# Patient Record
Sex: FEMALE | HISPANIC OR LATINO
[De-identification: names, ages, dates, MRNs, and addresses within clinical notes are randomized per-mention and may not be internally consistent; named-entity substitution may affect disease eponyms.]

---

## 2018-05-15 ENCOUNTER — RX ONLY (RX ONLY)
Age: 58
End: 2018-05-15

## 2018-05-15 ENCOUNTER — DOCTOR'S OFFICE (OUTPATIENT)
Dept: URBAN - METROPOLITAN AREA CLINIC 136 | Facility: CLINIC | Age: 58
Setting detail: OPHTHALMOLOGY
End: 2018-05-15
Payer: MEDICARE

## 2018-05-15 ENCOUNTER — OPTICAL OFFICE (OUTPATIENT)
Dept: URBAN - METROPOLITAN AREA CLINIC 143 | Facility: CLINIC | Age: 58
Setting detail: OPHTHALMOLOGY
End: 2018-05-15
Payer: COMMERCIAL

## 2018-05-15 DIAGNOSIS — H52.223: ICD-10-CM

## 2018-05-15 DIAGNOSIS — H52.4: ICD-10-CM

## 2018-05-15 DIAGNOSIS — H52.03: ICD-10-CM

## 2018-05-15 PROCEDURE — V2020 VISION SVCS FRAMES PURCHASES: HCPCS | Performed by: OPTOMETRIST

## 2018-05-15 PROCEDURE — V2203 LENS SPHCYL BIFOCAL 4.00D/.1: HCPCS | Performed by: OPTOMETRIST

## 2018-05-15 PROCEDURE — V2781 PROGRESSIVE LENS PER LENS: HCPCS | Performed by: OPTOMETRIST

## 2018-05-15 PROCEDURE — 92004 COMPRE OPH EXAM NEW PT 1/>: CPT | Performed by: OPTOMETRIST

## 2018-05-15 ASSESSMENT — REFRACTION_AUTOREFRACTION
OS_CYLINDER: -1.00
OD_AXIS: 110
OD_SPHERE: +0.50
OS_SPHERE: +1.25
OS_AXIS: 76
OD_CYLINDER: -0.75

## 2018-05-15 ASSESSMENT — REFRACTION_OUTSIDERX
OS_VA3: 20/
OS_AXIS: 075
OU_VA: 20/20
OD_SPHERE: +0.50
OD_VA1: 20/20
OD_ADD: +2.00
OS_ADD: +2.00
OD_VA3: 20/
OD_VA2: 20/20
OS_VA1: 20/20
OS_CYLINDER: -0.75
OS_SPHERE: +1.25
OS_VA2: 20/20
OD_CYLINDER: SPH

## 2018-05-15 ASSESSMENT — REFRACTION_MANIFEST
OD_VA1: 20/
OS_VA2: 20/
OS_VA3: 20/
OS_VA3: 20/
OD_VA1: 20/
OU_VA: 20/
OD_VA2: 20/
OD_VA3: 20/
OU_VA: 20/
OS_VA2: 20/
OD_VA3: 20/
OD_VA2: 20/
OS_VA1: 20/
OS_VA1: 20/

## 2018-05-15 ASSESSMENT — REFRACTION_CURRENTRX
OD_OVR_VA: 20/
OD_OVR_VA: 20/
OS_OVR_VA: 20/
OD_OVR_VA: 20/

## 2018-05-15 ASSESSMENT — SPHEQUIV_DERIVED
OS_SPHEQUIV: 0.75
OD_SPHEQUIV: 0.125

## 2018-05-15 ASSESSMENT — VISUAL ACUITY
OS_BCVA: 20/20-2
OD_BCVA: 20/20-2

## 2018-05-15 ASSESSMENT — CONFRONTATIONAL VISUAL FIELD TEST (CVF)
OS_FINDINGS: FULL
OD_FINDINGS: FULL

## 2018-06-08 ENCOUNTER — OPTICAL OFFICE (OUTPATIENT)
Dept: URBAN - METROPOLITAN AREA CLINIC 143 | Facility: CLINIC | Age: 58
Setting detail: OPHTHALMOLOGY
End: 2018-06-08

## 2018-06-08 DIAGNOSIS — H52.7: ICD-10-CM

## 2018-06-08 PROCEDURE — V2020 VISION SVCS FRAMES PURCHASES: HCPCS | Performed by: OPTOMETRIST

## 2018-07-05 DIAGNOSIS — I10 ESSENTIAL HYPERTENSION: Primary | ICD-10-CM

## 2018-07-05 RX ORDER — NIFEDIPINE 30 MG/1
30 TABLET, FILM COATED, EXTENDED RELEASE ORAL DAILY
Qty: 30 TABLET | Refills: 0 | Status: SHIPPED | OUTPATIENT
Start: 2018-07-05 | End: 2018-08-07 | Stop reason: SDUPTHER

## 2018-07-05 RX ORDER — NIFEDIPINE 30 MG/1
30 TABLET, FILM COATED, EXTENDED RELEASE ORAL DAILY
Refills: 0 | COMMUNITY
Start: 2018-06-05 | End: 2018-07-05 | Stop reason: SDUPTHER

## 2018-08-07 DIAGNOSIS — E55.9 VITAMIN D DEFICIENCY: ICD-10-CM

## 2018-08-07 DIAGNOSIS — I10 ESSENTIAL HYPERTENSION: ICD-10-CM

## 2018-08-07 DIAGNOSIS — K21.9 GASTROESOPHAGEAL REFLUX DISEASE, ESOPHAGITIS PRESENCE NOT SPECIFIED: Primary | ICD-10-CM

## 2018-08-07 RX ORDER — MELATONIN
1000 DAILY
Qty: 30 TABLET | Refills: 0 | Status: SHIPPED | OUTPATIENT
Start: 2018-08-07 | End: 2019-10-17 | Stop reason: ALTCHOICE

## 2018-08-07 RX ORDER — PANTOPRAZOLE SODIUM 40 MG/1
40 TABLET, DELAYED RELEASE ORAL DAILY
Qty: 30 TABLET | Refills: 0 | Status: SHIPPED | OUTPATIENT
Start: 2018-08-07 | End: 2018-09-04 | Stop reason: SDUPTHER

## 2018-08-07 RX ORDER — NIFEDIPINE 30 MG/1
30 TABLET, FILM COATED, EXTENDED RELEASE ORAL DAILY
Qty: 30 TABLET | Refills: 0 | Status: SHIPPED | OUTPATIENT
Start: 2018-08-07 | End: 2018-09-04 | Stop reason: SDUPTHER

## 2018-08-30 PROBLEM — K27.9 PEPTIC ULCER: Status: ACTIVE | Noted: 2017-12-01

## 2018-08-30 PROBLEM — K63.5 COLON POLYP: Status: ACTIVE | Noted: 2018-01-12

## 2018-08-30 RX ORDER — SENNOSIDES 8.6 MG
1 CAPSULE ORAL EVERY 8 HOURS PRN
COMMUNITY
Start: 2014-10-28 | End: 2018-08-31 | Stop reason: SDUPTHER

## 2018-08-30 RX ORDER — CITALOPRAM 20 MG/1
1 TABLET ORAL EVERY 24 HOURS
COMMUNITY
Start: 2017-12-01 | End: 2019-10-17 | Stop reason: ALTCHOICE

## 2018-08-30 RX ORDER — LANOLIN ALCOHOL/MO/W.PET/CERES
1 CREAM (GRAM) TOPICAL
COMMUNITY
Start: 2017-12-01 | End: 2019-10-17 | Stop reason: ALTCHOICE

## 2018-08-30 RX ORDER — ATORVASTATIN CALCIUM 20 MG/1
1 TABLET, FILM COATED ORAL EVERY 24 HOURS
COMMUNITY
Start: 2018-05-24 | End: 2019-01-21 | Stop reason: SDUPTHER

## 2018-08-30 RX ORDER — SUMATRIPTAN 25 MG/1
1 TABLET, FILM COATED ORAL AS NEEDED
COMMUNITY
Start: 2016-08-12 | End: 2018-08-31 | Stop reason: SDUPTHER

## 2018-08-30 RX ORDER — ERGOCALCIFEROL 1.25 MG/1
1 CAPSULE ORAL WEEKLY
COMMUNITY
Start: 2018-02-05 | End: 2019-10-17 | Stop reason: ALTCHOICE

## 2018-08-30 RX ORDER — ACETAMINOPHEN, ASPIRIN AND CAFFEINE 250; 250; 65 MG/1; MG/1; MG/1
1 TABLET, FILM COATED ORAL AS NEEDED
COMMUNITY
End: 2022-01-19

## 2018-08-31 ENCOUNTER — OFFICE VISIT (OUTPATIENT)
Dept: FAMILY MEDICINE CLINIC | Facility: CLINIC | Age: 58
End: 2018-08-31
Payer: COMMERCIAL

## 2018-08-31 VITALS
HEART RATE: 72 BPM | HEIGHT: 65 IN | DIASTOLIC BLOOD PRESSURE: 90 MMHG | WEIGHT: 218 LBS | RESPIRATION RATE: 12 BRPM | TEMPERATURE: 97.1 F | OXYGEN SATURATION: 99 % | BODY MASS INDEX: 36.32 KG/M2 | SYSTOLIC BLOOD PRESSURE: 130 MMHG

## 2018-08-31 DIAGNOSIS — G43.109 MIGRAINE WITH AURA AND WITHOUT STATUS MIGRAINOSUS, NOT INTRACTABLE: ICD-10-CM

## 2018-08-31 DIAGNOSIS — E78.49 OTHER HYPERLIPIDEMIA: ICD-10-CM

## 2018-08-31 DIAGNOSIS — M72.2 PLANTAR FASCIITIS OF RIGHT FOOT: Primary | ICD-10-CM

## 2018-08-31 PROCEDURE — 99213 OFFICE O/P EST LOW 20 MIN: CPT | Performed by: FAMILY MEDICINE

## 2018-08-31 PROCEDURE — 3008F BODY MASS INDEX DOCD: CPT | Performed by: FAMILY MEDICINE

## 2018-08-31 RX ORDER — MELOXICAM 7.5 MG/1
7.5 TABLET ORAL DAILY PRN
Qty: 30 TABLET | Refills: 0 | Status: SHIPPED | OUTPATIENT
Start: 2018-08-31 | End: 2019-10-17 | Stop reason: ALTCHOICE

## 2018-08-31 RX ORDER — SUMATRIPTAN 25 MG/1
25 TABLET, FILM COATED ORAL AS NEEDED
Qty: 15 TABLET | Refills: 0 | Status: SHIPPED | OUTPATIENT
Start: 2018-08-31 | End: 2019-10-17 | Stop reason: ALTCHOICE

## 2018-08-31 RX ORDER — SENNOSIDES 8.6 MG
650 CAPSULE ORAL EVERY 8 HOURS PRN
Qty: 90 TABLET | Refills: 0 | Status: SHIPPED | OUTPATIENT
Start: 2018-08-31 | End: 2019-10-17 | Stop reason: SDUPTHER

## 2018-08-31 NOTE — PATIENT INSTRUCTIONS
For left foot pain  - Meloxicam once a day with food  Continue protonix  - Exercise as directed  - Cold compresses  - Shoe orthotic  Avoid sandals  For migraine  - Stay well hydrated  - Maintain head ache log  - Avoid excess caffeine, wine, aged cheese, chocolates  - Imitrex at start   If using more than two a week contact office

## 2018-08-31 NOTE — PROGRESS NOTES
Assessment/Plan:    Plantar fasciitis of right foot  - Tylenol 650 mg Q6 prn,  Meloxicam 7 5 mg QD prn  - Advised use of shoe insert, avoid sandals  - Exercises for plantar fascia as demonstrated  - Ice packs/ warm compresses as needed   - Follow up in one month  If pain persists will consider steroid injection during follow up    Hyperlipidemia  - H/o TIA v/s migraine   - 12/2017 Total cholesterol 232     - Would dolly to be on high intensity statin, however on atorvastatin 20 mg HS  Would target LDL lowering ideally to 70 and not more than 100  - Lipid panel and adjust statin based on result    Migraine with aura   - Less than 2 episodes/ week  - Stay hydrated, good sleep hygiene, avoid excess caffeine/ wine/ aged cheese/ chocolates  - Headache log  - Refill on imitrex provided   - States already established with neurologist  Encouraged to keep up with follow up visits     Diagnoses and all orders for this visit:    Plantar fasciitis of right foot  -     acetaminophen (TYLENOL 8 HOUR ARTHRITIS PAIN) 650 mg CR tablet; Take 1 tablet (650 mg total) by mouth every 8 (eight) hours as needed for moderate pain or headaches  -     meloxicam (MOBIC) 7 5 mg tablet; Take 1 tablet (7 5 mg total) by mouth daily as needed for moderate pain    Migraine with aura and without status migrainosus, not intractable  -     SUMAtriptan (IMITREX) 25 mg tablet; Take 1 tablet (25 mg total) by mouth as needed for migraine May repeat after 2 hours if headache returns, not to exceed 200 mg in 24 hours    Other hyperlipidemia  -     Lipid panel; Future    Other orders  -     atorvastatin (LIPITOR) 20 mg tablet; Take 1 tablet by mouth every 24 hours  -     citalopram (CELEXA) 20 mg tablet; Take 1 tablet by mouth every 24 hours  -     aspirin-acetaminophen-caffeine (EXCEDRIN MIGRAINE) 321-225-36 MG per tablet; Take 1 tablet by mouth as needed  -     melatonin 3 mg;  Take 1 tablet by mouth daily at bedtime  -     Discontinue: SUMAtriptan (IMITREX) 25 mg tablet; Take 1 tablet by mouth as needed May repeat after 2 hours if headache returns, not to exceed 200 mg in 24 hours  -     Discontinue: acetaminophen (TYLENOL 8 HOUR ARTHRITIS PAIN) 650 mg CR tablet; Take 1 tablet by mouth every 8 (eight) hours as needed  -     ergocalciferol (VITAMIN D2) 50,000 units; Take 1 capsule by mouth once a week          Subjective:      Patient ID: Tim Cross is a 65 y o  female  PMH of migraine with aura/ possible TIA/ HLD/ GERD       Leg Pain    The incident occurred more than 1 week ago  The incident occurred at home  There was no injury mechanism  The pain is present in the left foot  The quality of the pain is described as aching  The pain is severe  The pain has been constant since onset  Pertinent negatives include no inability to bear weight, loss of motion, loss of sensation, muscle weakness, numbness or tingling  She reports no foreign bodies present  The symptoms are aggravated by palpation and weight bearing  She has tried nothing for the symptoms  The following portions of the patient's history were reviewed and updated as appropriate: She  has no past medical history on file  Patient Active Problem List    Diagnosis Date Noted    Colon polyp 01/12/2018    Peptic ulcer 12/01/2017    Allergic rhinitis 08/12/2016    Vitamin D deficiency 08/12/2016    Sleep disorder 08/12/2016    Anxiety 11/04/2014    Depression 11/04/2014    Gastroesophageal reflux disease 11/04/2014    Hypertension 11/04/2014    Hyperlipidemia 11/04/2014    Temporary cerebral vascular dysfunction 11/04/2014    Obesity 09/24/2014    Migraine with aura 05/07/2012     She  has no past surgical history on file  Her family history is not on file  She  reports that she has never smoked  She does not have any smokeless tobacco history on file  Her alcohol and drug histories are not on file    Current Outpatient Prescriptions   Medication Sig Dispense Refill    acetaminophen (TYLENOL 8 HOUR ARTHRITIS PAIN) 650 mg CR tablet Take 1 tablet (650 mg total) by mouth every 8 (eight) hours as needed for moderate pain or headaches 90 tablet 0    atorvastatin (LIPITOR) 20 mg tablet Take 1 tablet by mouth every 24 hours      citalopram (CELEXA) 20 mg tablet Take 1 tablet by mouth every 24 hours      ergocalciferol (VITAMIN D2) 50,000 units Take 1 capsule by mouth once a week      melatonin 3 mg Take 1 tablet by mouth daily at bedtime      SUMAtriptan (IMITREX) 25 mg tablet Take 1 tablet (25 mg total) by mouth as needed for migraine May repeat after 2 hours if headache returns, not to exceed 200 mg in 24 hours 15 tablet 0    aspirin-acetaminophen-caffeine (EXCEDRIN MIGRAINE) 250-250-65 MG per tablet Take 1 tablet by mouth as needed      cholecalciferol (VITAMIN D3) 1,000 units tablet Take 1 tablet (1,000 Units total) by mouth daily 30 tablet 0    meloxicam (MOBIC) 7 5 mg tablet Take 1 tablet (7 5 mg total) by mouth daily as needed for moderate pain 30 tablet 0    NIFEdipine ER (ADALAT CC) 30 MG 24 hr tablet Take 1 tablet (30 mg total) by mouth daily 30 tablet 0    pantoprazole (PROTONIX) 40 mg tablet Take 1 tablet (40 mg total) by mouth daily 30 tablet 0     No current facility-administered medications for this visit  Current Outpatient Prescriptions on File Prior to Visit   Medication Sig    cholecalciferol (VITAMIN D3) 1,000 units tablet Take 1 tablet (1,000 Units total) by mouth daily    NIFEdipine ER (ADALAT CC) 30 MG 24 hr tablet Take 1 tablet (30 mg total) by mouth daily    pantoprazole (PROTONIX) 40 mg tablet Take 1 tablet (40 mg total) by mouth daily     No current facility-administered medications on file prior to visit  She is allergic to aspirin       Review of Systems   Constitutional: Negative for chills and fever  HENT: Negative for postnasal drip, sinus pain, sinus pressure, sore throat and trouble swallowing      Eyes: Negative for visual disturbance  Respiratory: Negative for cough, chest tightness and shortness of breath  Cardiovascular: Negative for chest pain, palpitations and leg swelling  Gastrointestinal: Negative for abdominal pain, constipation, diarrhea, nausea and vomiting  Genitourinary: Negative for dysuria and hematuria  Musculoskeletal: Negative for gait problem, joint swelling and myalgias  Right foot pain, over heel   Skin: Negative for rash  Neurological: Positive for headaches  Negative for tingling and numbness  Objective:      /90 (BP Location: Right arm, Patient Position: Sitting, Cuff Size: Large)   Pulse 72   Temp (!) 97 1 °F (36 2 °C) (Temporal)   Resp 12   Ht 5' 5" (1 651 m)   Wt 98 9 kg (218 lb)   SpO2 99%   BMI 36 28 kg/m²          Physical Exam   Constitutional: She appears well-developed and well-nourished  No distress  HENT:   Head: Normocephalic and atraumatic  Mouth/Throat: Oropharynx is clear and moist    Eyes: Conjunctivae and EOM are normal  Pupils are equal, round, and reactive to light  Right eye exhibits no discharge  Left eye exhibits no discharge  Neck: Normal range of motion  No JVD present  No thyromegaly present  Cardiovascular: Normal rate, regular rhythm, normal heart sounds and intact distal pulses  Exam reveals no gallop and no friction rub  No murmur heard  Pulmonary/Chest: Effort normal and breath sounds normal  No respiratory distress  She has no wheezes  She has no rales  Abdominal: Soft  Bowel sounds are normal  There is no tenderness  Musculoskeletal:        Right ankle: She exhibits normal range of motion, no swelling, no ecchymosis, no deformity, no laceration and normal pulse  No lateral malleolus, no medial malleolus, no AITFL, no CF ligament, no posterior TFL, no head of 5th metatarsal and no proximal fibula tenderness found  Achilles tendon exhibits no pain          Left ankle: Normal         Feet:    Lymphadenopathy:     She has no cervical adenopathy  Skin: She is not diaphoretic

## 2018-09-04 DIAGNOSIS — I10 ESSENTIAL HYPERTENSION: ICD-10-CM

## 2018-09-04 DIAGNOSIS — K21.9 GASTROESOPHAGEAL REFLUX DISEASE, ESOPHAGITIS PRESENCE NOT SPECIFIED: ICD-10-CM

## 2018-09-04 RX ORDER — NIFEDIPINE 30 MG/1
30 TABLET, FILM COATED, EXTENDED RELEASE ORAL DAILY
Qty: 30 TABLET | Refills: 0 | Status: SHIPPED | OUTPATIENT
Start: 2018-09-04 | End: 2018-10-07 | Stop reason: SDUPTHER

## 2018-09-04 RX ORDER — PANTOPRAZOLE SODIUM 40 MG/1
40 TABLET, DELAYED RELEASE ORAL DAILY
Qty: 30 TABLET | Refills: 0 | Status: SHIPPED | OUTPATIENT
Start: 2018-09-04 | End: 2018-10-07 | Stop reason: SDUPTHER

## 2018-09-28 DIAGNOSIS — M72.2 PLANTAR FASCIITIS OF RIGHT FOOT: ICD-10-CM

## 2018-09-28 RX ORDER — MELOXICAM 7.5 MG/1
TABLET ORAL
Qty: 30 TABLET | Refills: 0 | OUTPATIENT
Start: 2018-09-28

## 2018-09-28 NOTE — TELEPHONE ENCOUNTER
Spoke with patient  Last received prescription for meloxicam 7 5 mg on 8/31/2018 for 30 tablets  Encouraged her to use existing medication only as needed when pain is severe  For mild to moderate pain can use tylenol 650 mg every 6-8 hours as needed  Patient verbalized understanding and had no further questions

## 2018-10-07 DIAGNOSIS — I10 ESSENTIAL HYPERTENSION: ICD-10-CM

## 2018-10-07 DIAGNOSIS — K21.9 GASTROESOPHAGEAL REFLUX DISEASE, ESOPHAGITIS PRESENCE NOT SPECIFIED: ICD-10-CM

## 2018-10-08 RX ORDER — PANTOPRAZOLE SODIUM 40 MG/1
TABLET, DELAYED RELEASE ORAL
Qty: 30 TABLET | Refills: 0 | Status: SHIPPED | OUTPATIENT
Start: 2018-10-08 | End: 2019-10-17 | Stop reason: ALTCHOICE

## 2018-10-08 RX ORDER — NIFEDIPINE 30 MG/1
TABLET, FILM COATED, EXTENDED RELEASE ORAL
Qty: 30 TABLET | Refills: 1 | Status: SHIPPED | OUTPATIENT
Start: 2018-10-08 | End: 2018-12-03 | Stop reason: SDUPTHER

## 2018-12-03 DIAGNOSIS — I10 ESSENTIAL HYPERTENSION: ICD-10-CM

## 2018-12-04 RX ORDER — NIFEDIPINE 30 MG/1
TABLET, FILM COATED, EXTENDED RELEASE ORAL
Qty: 30 TABLET | Refills: 0 | Status: SHIPPED | OUTPATIENT
Start: 2018-12-04 | End: 2018-12-31 | Stop reason: SDUPTHER

## 2018-12-31 DIAGNOSIS — I10 ESSENTIAL HYPERTENSION: ICD-10-CM

## 2018-12-31 RX ORDER — NIFEDIPINE 30 MG/1
TABLET, FILM COATED, EXTENDED RELEASE ORAL
Qty: 30 TABLET | Refills: 0 | Status: SHIPPED | OUTPATIENT
Start: 2018-12-31 | End: 2019-01-23 | Stop reason: SDUPTHER

## 2019-01-21 DIAGNOSIS — E78.5 HYPERLIPIDEMIA, UNSPECIFIED HYPERLIPIDEMIA TYPE: Primary | ICD-10-CM

## 2019-01-21 RX ORDER — ATORVASTATIN CALCIUM 20 MG/1
20 TABLET, FILM COATED ORAL EVERY 24 HOURS
Qty: 30 TABLET | Refills: 1 | Status: SHIPPED | OUTPATIENT
Start: 2019-01-21 | End: 2019-10-17 | Stop reason: ALTCHOICE

## 2019-01-21 NOTE — TELEPHONE ENCOUNTER
Refill prescribed  Labs from last visit pending  Patient needs appointment scheduled for her HTN/ HLD

## 2019-01-21 NOTE — TELEPHONE ENCOUNTER
I contact Mariana and schedule her at your next available appt slot which is 03/01/2019 @3:20PM  Trisha Beard understands and is aware of this appt

## 2019-01-22 DIAGNOSIS — I10 ESSENTIAL HYPERTENSION: ICD-10-CM

## 2019-01-23 RX ORDER — NIFEDIPINE 30 MG/1
30 TABLET, FILM COATED, EXTENDED RELEASE ORAL DAILY
Qty: 30 TABLET | Refills: 0 | Status: SHIPPED | OUTPATIENT
Start: 2019-01-23 | End: 2019-10-17 | Stop reason: ALTCHOICE

## 2019-01-23 RX ORDER — NIFEDIPINE 30 MG/1
TABLET, FILM COATED, EXTENDED RELEASE ORAL
Qty: 30 TABLET | Refills: 0 | OUTPATIENT
Start: 2019-01-23

## 2019-05-31 ENCOUNTER — OPTICAL OFFICE (OUTPATIENT)
Dept: URBAN - METROPOLITAN AREA CLINIC 143 | Facility: CLINIC | Age: 59
Setting detail: OPHTHALMOLOGY
End: 2019-05-31

## 2019-05-31 ENCOUNTER — DOCTOR'S OFFICE (OUTPATIENT)
Dept: URBAN - METROPOLITAN AREA CLINIC 136 | Facility: CLINIC | Age: 59
Setting detail: OPHTHALMOLOGY
End: 2019-05-31
Payer: MEDICARE

## 2019-05-31 DIAGNOSIS — H52.03: ICD-10-CM

## 2019-05-31 DIAGNOSIS — H52.223: ICD-10-CM

## 2019-05-31 DIAGNOSIS — H52.4: ICD-10-CM

## 2019-05-31 PROCEDURE — V2020 VISION SVCS FRAMES PURCHASES: HCPCS | Performed by: OPTOMETRIST

## 2019-05-31 PROCEDURE — V2103 SPHEROCYLINDR 4.00D/12-2.00D: HCPCS | Performed by: OPTOMETRIST

## 2019-05-31 PROCEDURE — V2100 LENS SPHER SINGLE PLANO 4.00: HCPCS | Performed by: OPTOMETRIST

## 2019-05-31 PROCEDURE — 92014 COMPRE OPH EXAM EST PT 1/>: CPT | Performed by: OPTOMETRIST

## 2019-05-31 ASSESSMENT — REFRACTION_MANIFEST
OD_VA2: 20/20
OS_ADD: +2.25
OD_CYLINDER: SPH
OD_VA3: 20/
OU_VA: 20/
OD_VA1: 20/
OS_VA1: 20/20
OS_CYLINDER: -0.75
OS_VA2: 20/
OS_VA3: 20/
OD_ADD: +2.25
OS_VA1: 20/
OU_VA: 20/20
OS_VA2: 20/20
OD_VA3: 20/
OS_SPHERE: +1.50
OS_VA3: 20/
OS_AXIS: 075
OD_VA2: 20/
OD_VA1: 20/20
OD_SPHERE: +0.75

## 2019-05-31 ASSESSMENT — REFRACTION_CURRENTRX
OS_SPHERE: +1.25
OD_OVR_VA: 20/
OD_CYLINDER: SPH
OS_OVR_VA: 20/
OD_ADD: +2.00
OS_CYLINDER: -0.75
OS_ADD: +2.00
OD_OVR_VA: 20/
OD_VPRISM_DIRECTION: PROGS
OS_OVR_VA: 20/
OD_OVR_VA: 20/
OS_OVR_VA: 20/
OS_AXIS: 075
OD_SPHERE: +0.50
OS_VPRISM_DIRECTION: PROGS

## 2019-05-31 ASSESSMENT — REFRACTION_AUTOREFRACTION
OS_AXIS: 069
OD_CYLINDER: -0.75
OS_CYLINDER: -0.75
OS_SPHERE: +1.50
OD_AXIS: 126
OD_SPHERE: +1.00

## 2019-05-31 ASSESSMENT — VISUAL ACUITY
OS_BCVA: 20/20-1
OD_BCVA: 20/20-1

## 2019-05-31 ASSESSMENT — CONFRONTATIONAL VISUAL FIELD TEST (CVF)
OD_FINDINGS: FULL
OS_FINDINGS: FULL

## 2019-05-31 ASSESSMENT — SPHEQUIV_DERIVED
OD_SPHEQUIV: 0.625
OS_SPHEQUIV: 1.125
OS_SPHEQUIV: 1.125

## 2019-10-17 ENCOUNTER — OFFICE VISIT (OUTPATIENT)
Dept: FAMILY MEDICINE CLINIC | Facility: CLINIC | Age: 59
End: 2019-10-17

## 2019-10-17 VITALS
OXYGEN SATURATION: 98 % | TEMPERATURE: 97.1 F | RESPIRATION RATE: 18 BRPM | SYSTOLIC BLOOD PRESSURE: 144 MMHG | HEART RATE: 74 BPM | BODY MASS INDEX: 38.72 KG/M2 | DIASTOLIC BLOOD PRESSURE: 90 MMHG | WEIGHT: 232.7 LBS

## 2019-10-17 DIAGNOSIS — I10 ESSENTIAL HYPERTENSION: Primary | ICD-10-CM

## 2019-10-17 DIAGNOSIS — K21.9 GASTROESOPHAGEAL REFLUX DISEASE WITHOUT ESOPHAGITIS: ICD-10-CM

## 2019-10-17 DIAGNOSIS — E55.9 VITAMIN D DEFICIENCY: ICD-10-CM

## 2019-10-17 DIAGNOSIS — G89.29 CHRONIC NONINTRACTABLE HEADACHE, UNSPECIFIED HEADACHE TYPE: ICD-10-CM

## 2019-10-17 DIAGNOSIS — R51.9 CHRONIC NONINTRACTABLE HEADACHE, UNSPECIFIED HEADACHE TYPE: ICD-10-CM

## 2019-10-17 DIAGNOSIS — Z91.89 AT RISK FOR OBSTRUCTIVE SLEEP APNEA: ICD-10-CM

## 2019-10-17 DIAGNOSIS — Z23 FLU VACCINE NEED: ICD-10-CM

## 2019-10-17 DIAGNOSIS — E66.09 CLASS 2 OBESITY DUE TO EXCESS CALORIES WITH BODY MASS INDEX (BMI) OF 38.0 TO 38.9 IN ADULT, UNSPECIFIED WHETHER SERIOUS COMORBIDITY PRESENT: ICD-10-CM

## 2019-10-17 PROBLEM — K27.9 PEPTIC ULCER: Status: RESOLVED | Noted: 2017-12-01 | Resolved: 2019-10-17

## 2019-10-17 PROCEDURE — 90471 IMMUNIZATION ADMIN: CPT | Performed by: FAMILY MEDICINE

## 2019-10-17 PROCEDURE — 90682 RIV4 VACC RECOMBINANT DNA IM: CPT | Performed by: FAMILY MEDICINE

## 2019-10-17 PROCEDURE — 99213 OFFICE O/P EST LOW 20 MIN: CPT | Performed by: FAMILY MEDICINE

## 2019-10-17 RX ORDER — SENNOSIDES 8.6 MG
650 CAPSULE ORAL EVERY 8 HOURS PRN
Qty: 90 TABLET | Refills: 0
Start: 2019-10-17 | End: 2021-04-21

## 2019-10-17 RX ORDER — SENNOSIDES 8.6 MG
650 CAPSULE ORAL EVERY 8 HOURS PRN
Qty: 90 TABLET | Refills: 0 | Status: SHIPPED | OUTPATIENT
Start: 2019-10-17 | End: 2019-10-17

## 2019-10-17 RX ORDER — PANTOPRAZOLE SODIUM 40 MG/1
40 TABLET, DELAYED RELEASE ORAL DAILY
Qty: 90 TABLET | Refills: 0 | Status: SHIPPED | OUTPATIENT
Start: 2019-10-17 | End: 2020-01-14

## 2019-10-17 NOTE — ASSESSMENT & PLAN NOTE
- H/O migraine with aura and use of imitrex  - Current description suspicious for tension type v/s frontal headache sec to allergies / ANUPAM  - Stay well hydrated   - Limit caffeine  Avoid soda  - Good sleep hygiene  - Sleep med ref for sleep study   - Maintain headache log  - Tylenol 650 mg Q6-8 H prn headache  - Caution with NSAIDs given reflex and possible H/O PUD  IF severe pain may take ibuprofen 400 mg under PPI cover  Minimize use of excedrin  Caution with total tylenol dose in a day ( < 3 g/day)      - Follow up in 2 weeks

## 2019-10-17 NOTE — PROGRESS NOTES
Assessment/Plan:    Hypertension  - Off medication for about a year  - Goal per [facility] < 140/90  Fairly controlled today 140/88 - > 144/90  - Recheck at follow up in 2 weeks  If > 140/90 start medication    - Check CMP, urine MCR     Chronic nonintractable headache  - H/O migraine with aura and use of imitrex  - Current description suspicious for tension type v/s frontal headache sec to allergies / ANUPAM  - Stay well hydrated   - Limit caffeine  Avoid soda  - Good sleep hygiene  - Sleep med ref for sleep study   - Maintain headache log  - Tylenol 650 mg Q6-8 H prn headache  - Caution with NSAIDs given reflex and possible H/O PUD  IF severe pain may take ibuprofen 400 mg under PPI cover  Minimize use of excedrin  Caution with total tylenol dose in a day ( < 3 g/day)   - Follow up in 2 weeks     Obesity  - BMI 38 72  - Check CBC, CMP, lipid panel, TSH, HbA1c   - STOP BANG score high risk for ANUPAM   Sleep med referral placed   - F/u with lab work for discussion on diet and lifestyle modification     Gastroesophageal reflux disease  - Protonix 40 mg Qam  - Caution with NSAIDs  - Diet and lifestyle modification for GERD  - Patient with possible H/O PUD  Would check stool for H pylori at follow up   Diagnoses and all orders for this visit:    Essential hypertension  -     CBC and differential; Future  -     Comprehensive metabolic panel; Future  -     Lipid Panel with Direct LDL reflex; Future  -     Cancel: Microalbumin / creatinine urine ratio  -     Microalbumin / creatinine urine ratio    Chronic nonintractable headache, unspecified headache type  -     acetaminophen (TYLENOL 8 HOUR ARTHRITIS PAIN) 650 mg CR tablet; Take 1 tablet (650 mg total) by mouth every 8 (eight) hours as needed for moderate pain or headaches  -     Sedimentation rate, automated;  Future    Class 2 obesity due to excess calories with body mass index (BMI) of 38 0 to 38 9 in adult, unspecified whether serious comorbidity present  - CBC and differential; Future  -     Lipid Panel with Direct LDL reflex; Future  -     HEMOGLOBIN A1C W/ EAG ESTIMATION; Future  -     TSH, 3rd generation with Free T4 reflex; Future    Gastroesophageal reflux disease without esophagitis  -     pantoprazole (PROTONIX) 40 mg tablet; Take 1 tablet (40 mg total) by mouth daily    At risk for obstructive sleep apnea  -     Ambulatory referral to Sleep Medicine; Future    Vitamin D deficiency  -     Vitamin D 25 hydroxy; Future    Flu vaccine need  -     influenza vaccine, [id number], quadrivalent, recombinant, PF, 0 5 mL, for patients 18 yr+ (FLUBLOK)    Other orders  -     Discontinue: acetaminophen (TYLENOL 8 HOUR ARTHRITIS PAIN) 650 mg CR tablet; Take 1 tablet (650 mg total) by mouth every 8 (eight) hours as needed for moderate pain or headaches          Subjective:      Patient ID: Blas Alex is a 63 y o  female  63 y o female last seen in office in 8/2018 presents for follow up  States she got busy at home and work and unable to make f/u appt  Has been off medications for close to a year  Has not been seeing any other provider  C/O headache - on and off, occurring upto 2 - 3 times a week  She may go upto 3 weeks headache free in some instances  Headache is located frontally and retro orbital  Not associated with any aura/ rhinorrhea/lacrimation  Usually occurs early morning and resolves with coffee and excedrin  Photo/phonophobia +  Patient is able to proceed with her activities even with the headache  H/O migraine diagnosed in DR  Was on imitrex and some "injection" in past  States excedrin/ migraine medication upsets her stomach  She also C/O heart burn and epigastric pain with acidic/ spicy food  Patient sleeps about 6 hours a day but does not feel rested  C/O Snoring           The following portions of the patient's history were reviewed and updated as appropriate: She  has a past medical history of Peptic ulcer (12/1/2017) and Temporary cerebral vascular dysfunction (11/4/2014)  Patient Active Problem List    Diagnosis Date Noted    Chronic nonintractable headache 10/17/2019    Colon polyp 01/12/2018    Allergic rhinitis 08/12/2016    Vitamin D deficiency 08/12/2016    Sleep disorder 08/12/2016    Anxiety 11/04/2014    Depression 11/04/2014    Gastroesophageal reflux disease 11/04/2014    Hypertension 11/04/2014    Hyperlipidemia 11/04/2014    Obesity 09/24/2014    Migraine with aura 05/07/2012     She  has no past surgical history on file  Her family history is not on file  She  reports that she has never smoked  She has never used smokeless tobacco  She reports that she drank alcohol  She reports that she does not use drugs  Current Outpatient Medications   Medication Sig Dispense Refill    aspirin-acetaminophen-caffeine (EXCEDRIN MIGRAINE) 250-250-65 MG per tablet Take 1 tablet by mouth as needed      acetaminophen (TYLENOL 8 HOUR ARTHRITIS PAIN) 650 mg CR tablet Take 1 tablet (650 mg total) by mouth every 8 (eight) hours as needed for moderate pain or headaches 90 tablet 0    pantoprazole (PROTONIX) 40 mg tablet Take 1 tablet (40 mg total) by mouth daily 90 tablet 0     No current facility-administered medications for this visit        Current Outpatient Medications on File Prior to Visit   Medication Sig    aspirin-acetaminophen-caffeine (EXCEDRIN MIGRAINE) 250-250-65 MG per tablet Take 1 tablet by mouth as needed    [DISCONTINUED] acetaminophen (TYLENOL 8 HOUR ARTHRITIS PAIN) 650 mg CR tablet Take 1 tablet (650 mg total) by mouth every 8 (eight) hours as needed for moderate pain or headaches (Patient not taking: Reported on 10/17/2019)    [DISCONTINUED] atorvastatin (LIPITOR) 20 mg tablet Take 1 tablet (20 mg total) by mouth every 24 hours (Patient not taking: Reported on 10/17/2019)    [DISCONTINUED] cholecalciferol (VITAMIN D3) 1,000 units tablet Take 1 tablet (1,000 Units total) by mouth daily (Patient not taking: Reported on 10/17/2019)    [DISCONTINUED] citalopram (CELEXA) 20 mg tablet Take 1 tablet by mouth every 24 hours    [DISCONTINUED] ergocalciferol (VITAMIN D2) 50,000 units Take 1 capsule by mouth once a week    [DISCONTINUED] melatonin 3 mg Take 1 tablet by mouth daily at bedtime    [DISCONTINUED] meloxicam (MOBIC) 7 5 mg tablet Take 1 tablet (7 5 mg total) by mouth daily as needed for moderate pain (Patient not taking: Reported on 10/17/2019)    [DISCONTINUED] NIFEdipine ER (ADALAT CC) 30 MG 24 hr tablet Take 1 tablet (30 mg total) by mouth daily (Patient not taking: Reported on 10/17/2019)    [DISCONTINUED] pantoprazole (PROTONIX) 40 mg tablet TAKE 1 TABLET BY MOUTH DAILY (Patient not taking: Reported on 10/17/2019)    [DISCONTINUED] SUMAtriptan (IMITREX) 25 mg tablet Take 1 tablet (25 mg total) by mouth as needed for migraine May repeat after 2 hours if headache returns, not to exceed 200 mg in 24 hours (Patient not taking: Reported on 10/17/2019)     No current facility-administered medications on file prior to visit  She is allergic to aspirin       Review of Systems   Constitutional: Negative for chills and fever  HENT: Negative for congestion, rhinorrhea, sinus pressure, sinus pain, sore throat and trouble swallowing  Eyes: Positive for visual disturbance (states she needs new glasses )  Respiratory: Negative for cough, shortness of breath and wheezing  Cardiovascular: Negative for chest pain, palpitations and leg swelling  Gastrointestinal: Positive for abdominal pain  Negative for blood in stool, constipation, diarrhea, nausea and vomiting  Genitourinary: Negative for dysuria and hematuria  Musculoskeletal: Negative for gait problem  Skin: Negative for rash  Neurological: Positive for headaches  Negative for seizures and weakness  Hematological: Negative for adenopathy           Objective:      /90 (BP Location: Right arm, Patient Position: Sitting)   Pulse 74   Temp Meg Genao ) 97 1 °F (36 2 °C) (Temporal)   Resp 18   Wt 106 kg (232 lb 11 2 oz)   SpO2 98%   Breastfeeding? No   BMI 38 72 kg/m²          Physical Exam   Constitutional: She is oriented to person, place, and time  She appears well-developed and well-nourished  No distress  HENT:   Head: Normocephalic and atraumatic  Right Ear: External ear normal    Left Ear: External ear normal    Mouth/Throat: Oropharynx is clear and moist  No oropharyngeal exudate  No temporal tenderness    Eyes: Pupils are equal, round, and reactive to light  Conjunctivae and EOM are normal  Right eye exhibits no discharge  Left eye exhibits no discharge  Neck: Normal range of motion  No JVD present  Cardiovascular: Normal rate, regular rhythm and normal heart sounds  Exam reveals no gallop and no friction rub  No murmur heard  Pulmonary/Chest: Effort normal  No stridor  No respiratory distress  She has no wheezes  She has no rales  Abdominal: Soft  She exhibits no distension  There is no tenderness  There is no rebound  Musculoskeletal: She exhibits no edema or tenderness  Lymphadenopathy:     She has no cervical adenopathy  Neurological: She is alert and oriented to person, place, and time  She has normal strength and normal reflexes  No cranial nerve deficit or sensory deficit  She exhibits normal muscle tone  She displays a negative Romberg sign  Coordination and gait normal    Skin: Skin is warm  No rash noted  She is not diaphoretic  Psychiatric: She has a normal mood and affect  Vitals reviewed

## 2019-10-17 NOTE — PATIENT INSTRUCTIONS
-Take tylenol 650 mg every 8 hours as needed for headache  Take it along with motrin 400 mg if needed   Avoid over use of motrin as it can upset stomach   Make sure total dose of tylenol less than 3 g /day  - Stay well hydrated   - Avoid soda/ alcohol  - Limit TV time  - Maintain good sleep hygiene - Atleast 6 - 8 hours a day  - Maintain headache diary

## 2019-10-17 NOTE — ASSESSMENT & PLAN NOTE
- Off medication for about a year  - Goal per [facility] < 140/90  Fairly controlled today 140/88 - > 144/90  - Recheck at follow up in 2 weeks   If > 140/90 start medication    - Check CMP, urine MCR

## 2019-10-17 NOTE — ASSESSMENT & PLAN NOTE
- Protonix 40 mg Qam  - Caution with NSAIDs  - Diet and lifestyle modification for GERD  - Patient with possible H/O PUD  Would check stool for H pylori at follow up

## 2019-10-17 NOTE — ASSESSMENT & PLAN NOTE
- BMI 38 72  - Check CBC, CMP, lipid panel, TSH, HbA1c   - STOP BANG score high risk for ANUPAM    Sleep med referral placed   - F/u with lab work for discussion on diet and lifestyle modification

## 2019-11-11 ENCOUNTER — OFFICE VISIT (OUTPATIENT)
Dept: FAMILY MEDICINE CLINIC | Facility: CLINIC | Age: 59
End: 2019-11-11

## 2019-11-11 VITALS
RESPIRATION RATE: 18 BRPM | SYSTOLIC BLOOD PRESSURE: 130 MMHG | OXYGEN SATURATION: 97 % | TEMPERATURE: 97.6 F | WEIGHT: 234 LBS | HEIGHT: 65 IN | BODY MASS INDEX: 38.99 KG/M2 | DIASTOLIC BLOOD PRESSURE: 88 MMHG | HEART RATE: 87 BPM

## 2019-11-11 DIAGNOSIS — J06.9 UPPER RESPIRATORY TRACT INFECTION, UNSPECIFIED TYPE: ICD-10-CM

## 2019-11-11 DIAGNOSIS — I10 ESSENTIAL HYPERTENSION: Primary | ICD-10-CM

## 2019-11-11 DIAGNOSIS — Z11.4 ENCOUNTER FOR SCREENING FOR HIV: ICD-10-CM

## 2019-11-11 DIAGNOSIS — Z11.59 ENCOUNTER FOR HEPATITIS C SCREENING TEST FOR LOW RISK PATIENT: ICD-10-CM

## 2019-11-11 PROCEDURE — 3075F SYST BP GE 130 - 139MM HG: CPT | Performed by: FAMILY MEDICINE

## 2019-11-11 PROCEDURE — 99213 OFFICE O/P EST LOW 20 MIN: CPT | Performed by: FAMILY MEDICINE

## 2019-11-11 PROCEDURE — 3008F BODY MASS INDEX DOCD: CPT | Performed by: FAMILY MEDICINE

## 2019-11-11 PROCEDURE — 3079F DIAST BP 80-89 MM HG: CPT | Performed by: FAMILY MEDICINE

## 2019-11-11 NOTE — PROGRESS NOTES
Assessment/Plan:    Hypertension  - Goal per [facility] < 140/90  - At goal, off medication at this visit  - Restrict salt intake to 2 4 g/day  - Counseled on diet and lifestyle modification for weight loss  - Continue to monitor pressure at follow up  If > 140/ 90 start medication  - Lab work pending  Encouraged to get it done  Upper respiratory tract infection  - Subjectively improving  - H/O hawking with mucous and streaks of blood   - Afebrile, no sinus tenderness  Chest CTABL  - Tylenol prn  - Steam inhalation prn  - Stay well hydrated  - Has received flu vaccine  - If any further episodes of hemoptysis or cough beyond 6 weeks will get CXR and quantiferon gold  Diagnoses and all orders for this visit:    Essential hypertension    Upper respiratory tract infection, unspecified type    Encounter for hepatitis C screening test for low risk patient  -     Hepatitis C antibody; Future    Encounter for screening for HIV  -     HIV 1/2 AG-AB combo; Future          Subjective:      Patient ID: Jamil Zamarripa is a 63 y o  female  63 y o female presents for f/u of HTN  Lab work at past visit pending  Patient had been to [facility] 31/10 -  4/11  For 2 days in  she had diarrhea  Since her return to [place] has been having a cold  No cough  No ear pain/ facial pain/ nausea/ vomiting/ diarrhea  Had sore throat a day back but that has resolved  Denies any fevers  C/O chills and increased sweating noted last week on 2 days  Improved since  Has been using nyquil and tylenol cough and cold prn  Past week patient hawked up some mucous and noted streaks of blood in mucous  States this has happened before about 1 1/2 years back when she had URI symptoms  Denies any diagnosis of TB or contact with person with TB  Denies any sick contacts  Has had flu vaccine this year         The following portions of the patient's history were reviewed and updated as appropriate: She  has a past medical history of Peptic ulcer (12/1/2017) and Temporary cerebral vascular dysfunction (11/4/2014)  Patient Active Problem List    Diagnosis Date Noted    Upper respiratory tract infection 11/11/2019    Chronic nonintractable headache 10/17/2019    Colon polyp 01/12/2018    Allergic rhinitis 08/12/2016    Vitamin D deficiency 08/12/2016    Sleep disorder 08/12/2016    Anxiety 11/04/2014    Depression 11/04/2014    Gastroesophageal reflux disease 11/04/2014    Hypertension 11/04/2014    Hyperlipidemia 11/04/2014    Obesity 09/24/2014    Migraine with aura 05/07/2012     She  has no past surgical history on file  Her family history is not on file  She  reports that she has never smoked  She has never used smokeless tobacco  She reports that she drank alcohol  She reports that she does not use drugs  Current Outpatient Medications   Medication Sig Dispense Refill    acetaminophen (TYLENOL 8 HOUR ARTHRITIS PAIN) 650 mg CR tablet Take 1 tablet (650 mg total) by mouth every 8 (eight) hours as needed for moderate pain or headaches 90 tablet 0    aspirin-acetaminophen-caffeine (EXCEDRIN MIGRAINE) 250-250-65 MG per tablet Take 1 tablet by mouth as needed      pantoprazole (PROTONIX) 40 mg tablet Take 1 tablet (40 mg total) by mouth daily 90 tablet 0     No current facility-administered medications for this visit  Current Outpatient Medications on File Prior to Visit   Medication Sig    acetaminophen (TYLENOL 8 HOUR ARTHRITIS PAIN) 650 mg CR tablet Take 1 tablet (650 mg total) by mouth every 8 (eight) hours as needed for moderate pain or headaches    aspirin-acetaminophen-caffeine (EXCEDRIN MIGRAINE) 250-250-65 MG per tablet Take 1 tablet by mouth as needed    pantoprazole (PROTONIX) 40 mg tablet Take 1 tablet (40 mg total) by mouth daily     No current facility-administered medications on file prior to visit  She is allergic to aspirin       Review of Systems   Constitutional: Positive for chills  Negative for fever  HENT: Positive for congestion, rhinorrhea and sore throat  Negative for ear discharge, ear pain, sinus pain and trouble swallowing  Eyes: Negative for discharge and redness  Respiratory: Negative for cough, shortness of breath and wheezing  Cardiovascular: Negative for chest pain, palpitations and leg swelling  Gastrointestinal: Negative for abdominal pain, blood in stool, constipation, diarrhea, nausea and vomiting  Genitourinary: Negative for dysuria and hematuria  Skin: Negative for rash  Neurological: Negative for seizures, weakness and headaches  Hematological: Negative for adenopathy  Objective:      /88 (BP Location: Left arm, Patient Position: Sitting, Cuff Size: Extra-Large)   Pulse 87   Temp 97 6 °F (36 4 °C) (Temporal)   Resp 18   Ht 5' 5" (1 651 m)   Wt 106 kg (234 lb)   SpO2 97%   Breastfeeding? No   BMI 38 94 kg/m²          Physical Exam   Constitutional: She is oriented to person, place, and time  She appears well-developed and well-nourished  No distress  HENT:   Head: Normocephalic and atraumatic  Right Ear: Tympanic membrane and ear canal normal    Left Ear: Tympanic membrane and ear canal normal    Nose: Nose normal  Right sinus exhibits no maxillary sinus tenderness and no frontal sinus tenderness  Left sinus exhibits no maxillary sinus tenderness and no frontal sinus tenderness  Mouth/Throat: Oropharynx is clear and moist  No oropharyngeal exudate  Eyes: Conjunctivae and EOM are normal  Right eye exhibits no discharge  Left eye exhibits no discharge  Neck: Normal range of motion  Cardiovascular: Normal rate, regular rhythm and normal heart sounds  Exam reveals no gallop and no friction rub  No murmur heard  Pulmonary/Chest: Effort normal  No stridor  No respiratory distress  She has no wheezes  She has no rales  Abdominal: Soft  She exhibits no distension  There is no tenderness  There is no rebound and no guarding     Musculoskeletal: She exhibits no edema or tenderness  Lymphadenopathy:     She has no cervical adenopathy  Neurological: She is alert and oriented to person, place, and time  She exhibits normal muscle tone  Skin: Skin is warm  No rash noted  She is not diaphoretic  Vitals reviewed

## 2019-11-12 NOTE — ASSESSMENT & PLAN NOTE
- Goal per [facility] < 140/90  - At goal, off medication at this visit  - Restrict salt intake to 2 4 g/day  - Counseled on diet and lifestyle modification for weight loss  - Continue to monitor pressure at follow up  If > 140/ 90 start medication  - Lab work pending  Encouraged to get it done

## 2019-11-12 NOTE — ASSESSMENT & PLAN NOTE
- Subjectively improving  - H/O hawking with mucous and streaks of blood   - Afebrile, no sinus tenderness  Chest CTABL  - Tylenol prn  - Steam inhalation prn  - Stay well hydrated  - Has received flu vaccine  - If any further episodes of hemoptysis or cough beyond 6 weeks will get CXR and quantiferon gold

## 2019-11-23 ENCOUNTER — APPOINTMENT (OUTPATIENT)
Dept: LAB | Facility: HOSPITAL | Age: 59
End: 2019-11-23
Payer: COMMERCIAL

## 2019-11-23 DIAGNOSIS — I10 ESSENTIAL HYPERTENSION: ICD-10-CM

## 2019-11-23 DIAGNOSIS — E66.09 CLASS 2 OBESITY DUE TO EXCESS CALORIES WITH BODY MASS INDEX (BMI) OF 38.0 TO 38.9 IN ADULT, UNSPECIFIED WHETHER SERIOUS COMORBIDITY PRESENT: ICD-10-CM

## 2019-11-23 DIAGNOSIS — Z11.59 ENCOUNTER FOR HEPATITIS C SCREENING TEST FOR LOW RISK PATIENT: ICD-10-CM

## 2019-11-23 DIAGNOSIS — G89.29 CHRONIC NONINTRACTABLE HEADACHE, UNSPECIFIED HEADACHE TYPE: ICD-10-CM

## 2019-11-23 DIAGNOSIS — Z11.4 ENCOUNTER FOR SCREENING FOR HIV: ICD-10-CM

## 2019-11-23 DIAGNOSIS — R51.9 CHRONIC NONINTRACTABLE HEADACHE, UNSPECIFIED HEADACHE TYPE: ICD-10-CM

## 2019-11-23 DIAGNOSIS — E55.9 VITAMIN D DEFICIENCY: ICD-10-CM

## 2019-11-23 LAB
25(OH)D3 SERPL-MCNC: 22.2 NG/ML (ref 30–100)
ALBUMIN SERPL BCP-MCNC: 4.3 G/DL (ref 3–5.2)
ALP SERPL-CCNC: 46 U/L (ref 43–122)
ALT SERPL W P-5'-P-CCNC: 24 U/L (ref 9–52)
ANION GAP SERPL CALCULATED.3IONS-SCNC: 6 MMOL/L (ref 5–14)
AST SERPL W P-5'-P-CCNC: 25 U/L (ref 14–36)
BASOPHILS # BLD AUTO: 0 THOUSANDS/ΜL (ref 0–0.1)
BASOPHILS NFR BLD AUTO: 0 % (ref 0–1)
BILIRUB SERPL-MCNC: 0.6 MG/DL
BUN SERPL-MCNC: 15 MG/DL (ref 5–25)
CALCIUM SERPL-MCNC: 9.3 MG/DL (ref 8.4–10.2)
CHLORIDE SERPL-SCNC: 104 MMOL/L (ref 97–108)
CHOLEST SERPL-MCNC: 251 MG/DL
CO2 SERPL-SCNC: 30 MMOL/L (ref 22–30)
CREAT SERPL-MCNC: 0.73 MG/DL (ref 0.6–1.2)
CREAT UR-MCNC: 64.3 MG/DL
EOSINOPHIL # BLD AUTO: 0.1 THOUSAND/ΜL (ref 0–0.4)
EOSINOPHIL NFR BLD AUTO: 2 % (ref 0–6)
ERYTHROCYTE [DISTWIDTH] IN BLOOD BY AUTOMATED COUNT: 14.2 %
ERYTHROCYTE [SEDIMENTATION RATE] IN BLOOD: 36 MM/HOUR (ref 1–20)
EST. AVERAGE GLUCOSE BLD GHB EST-MCNC: 123 MG/DL
GFR SERPL CREATININE-BSD FRML MDRD: 91 ML/MIN/1.73SQ M
GLUCOSE P FAST SERPL-MCNC: 96 MG/DL (ref 70–99)
HBA1C MFR BLD: 5.9 % (ref 4.2–6.3)
HCT VFR BLD AUTO: 44.3 % (ref 36–46)
HCV AB SER QL: NORMAL
HDLC SERPL-MCNC: 57 MG/DL
HGB BLD-MCNC: 14.4 G/DL (ref 12–16)
LDLC SERPL CALC-MCNC: 164 MG/DL
LYMPHOCYTES # BLD AUTO: 2 THOUSANDS/ΜL (ref 0.5–4)
LYMPHOCYTES NFR BLD AUTO: 40 % (ref 25–45)
MCH RBC QN AUTO: 28.5 PG (ref 26–34)
MCHC RBC AUTO-ENTMCNC: 32.6 G/DL (ref 31–36)
MCV RBC AUTO: 87 FL (ref 80–100)
MICROALBUMIN UR-MCNC: 12.4 MG/L (ref 0–20)
MICROALBUMIN/CREAT 24H UR: 19 MG/G CREATININE (ref 0–30)
MONOCYTES # BLD AUTO: 0.3 THOUSAND/ΜL (ref 0.2–0.9)
MONOCYTES NFR BLD AUTO: 7 % (ref 1–10)
NEUTROPHILS # BLD AUTO: 2.6 THOUSANDS/ΜL (ref 1.8–7.8)
NEUTS SEG NFR BLD AUTO: 51 % (ref 45–65)
PLATELET # BLD AUTO: 286 THOUSANDS/UL (ref 150–450)
PMV BLD AUTO: 9.9 FL (ref 8.9–12.7)
POTASSIUM SERPL-SCNC: 4.4 MMOL/L (ref 3.6–5)
PROT SERPL-MCNC: 8 G/DL (ref 5.9–8.4)
RBC # BLD AUTO: 5.07 MILLION/UL (ref 4–5.2)
SODIUM SERPL-SCNC: 140 MMOL/L (ref 137–147)
TRIGL SERPL-MCNC: 151 MG/DL
TSH SERPL DL<=0.05 MIU/L-ACNC: 1.75 UIU/ML (ref 0.47–4.68)
WBC # BLD AUTO: 5.1 THOUSAND/UL (ref 4.5–11)

## 2019-11-23 PROCEDURE — 82570 ASSAY OF URINE CREATININE: CPT | Performed by: FAMILY MEDICINE

## 2019-11-23 PROCEDURE — 86803 HEPATITIS C AB TEST: CPT

## 2019-11-23 PROCEDURE — 82043 UR ALBUMIN QUANTITATIVE: CPT | Performed by: FAMILY MEDICINE

## 2019-11-23 PROCEDURE — 80061 LIPID PANEL: CPT

## 2019-11-23 PROCEDURE — 36415 COLL VENOUS BLD VENIPUNCTURE: CPT

## 2019-11-23 PROCEDURE — 82306 VITAMIN D 25 HYDROXY: CPT

## 2019-11-23 PROCEDURE — 87389 HIV-1 AG W/HIV-1&-2 AB AG IA: CPT

## 2019-11-23 PROCEDURE — 85652 RBC SED RATE AUTOMATED: CPT

## 2019-11-23 PROCEDURE — 85025 COMPLETE CBC W/AUTO DIFF WBC: CPT

## 2019-11-23 PROCEDURE — 80053 COMPREHEN METABOLIC PANEL: CPT

## 2019-11-23 PROCEDURE — 83036 HEMOGLOBIN GLYCOSYLATED A1C: CPT

## 2019-11-23 PROCEDURE — 84443 ASSAY THYROID STIM HORMONE: CPT

## 2019-11-25 DIAGNOSIS — E78.2 MIXED HYPERLIPIDEMIA: Primary | ICD-10-CM

## 2019-11-25 DIAGNOSIS — E55.9 VITAMIN D INSUFFICIENCY: ICD-10-CM

## 2019-11-25 RX ORDER — ATORVASTATIN CALCIUM 20 MG/1
20 TABLET, FILM COATED ORAL DAILY
Qty: 90 TABLET | Refills: 0 | Status: SHIPPED | OUTPATIENT
Start: 2019-11-25 | End: 2020-02-20

## 2019-11-26 LAB — HIV 1+2 AB+HIV1 P24 AG SERPL QL IA: NORMAL

## 2020-01-08 ENCOUNTER — OFFICE VISIT (OUTPATIENT)
Dept: SLEEP CENTER | Facility: CLINIC | Age: 60
End: 2020-01-08
Payer: COMMERCIAL

## 2020-01-08 VITALS
WEIGHT: 229 LBS | DIASTOLIC BLOOD PRESSURE: 82 MMHG | HEIGHT: 65 IN | SYSTOLIC BLOOD PRESSURE: 122 MMHG | HEART RATE: 88 BPM | BODY MASS INDEX: 38.15 KG/M2

## 2020-01-08 DIAGNOSIS — R51.9 CHRONIC NONINTRACTABLE HEADACHE, UNSPECIFIED HEADACHE TYPE: ICD-10-CM

## 2020-01-08 DIAGNOSIS — G89.29 CHRONIC NONINTRACTABLE HEADACHE, UNSPECIFIED HEADACHE TYPE: ICD-10-CM

## 2020-01-08 DIAGNOSIS — E66.09 CLASS 2 OBESITY DUE TO EXCESS CALORIES WITH BODY MASS INDEX (BMI) OF 38.0 TO 38.9 IN ADULT, UNSPECIFIED WHETHER SERIOUS COMORBIDITY PRESENT: ICD-10-CM

## 2020-01-08 DIAGNOSIS — Z72.821 INADEQUATE SLEEP HYGIENE: ICD-10-CM

## 2020-01-08 DIAGNOSIS — Z91.89 AT RISK FOR OBSTRUCTIVE SLEEP APNEA: Primary | ICD-10-CM

## 2020-01-08 PROBLEM — J06.9 UPPER RESPIRATORY TRACT INFECTION: Status: RESOLVED | Noted: 2019-11-11 | Resolved: 2020-01-08

## 2020-01-08 PROCEDURE — 99244 OFF/OP CNSLTJ NEW/EST MOD 40: CPT | Performed by: NURSE PRACTITIONER

## 2020-01-08 RX ORDER — ZOLPIDEM TARTRATE 10 MG/1
TABLET ORAL
Qty: 1 TABLET | Refills: 1 | Status: SHIPPED | OUTPATIENT
Start: 2020-01-08 | End: 2021-04-21

## 2020-01-08 NOTE — PROGRESS NOTES
Consultation - Cintia, 1960, MRN: [id number]    1/8/2020        Reason for Consult / Principal Problem:    Evaluation of possible Obstructive Sleep Apnea  Chronic headaches  Hypertension  Hx of TIA  Obesity       Thank you for the opportunity of participating in the evaluation and care of this patient in the Sleep Clinic at [facility]  Subjective:     HPI: Portillo Neal is a 63y o  year old female  She presents for a consultation regarding possible sleep apnea  She complains of difficulty initiating and maintaining sleep  She keeps a very busy schedule with work and family responsibilities  She remains busy until she gets into bed and then, with TV on or the use of her phone, she tries to go to sleep  She may lie in bed for up to 3 hours using technology before she falls asleep  She awakens with the use of an alarm at 5:00-5:30am daily  She does not feel refreshed upon awakening  She has been told she snores loudly  She awakens at least one to two times  per night to use the bathroom  She has some difficulty returning to sleep at times, depending what time she awakens  In addition to chronic headaches and obesity, her comorbid conditions include hypertension, GERD, anxiety and depression  She also has a history of a TIA  Review of Systems      Genitourinary need to urinate more than twice a night   Cardiology none   Gastrointestinal none   Neurology frequent headaches and awaken with headache   Constitutional weight change   Integumentary none   Psychiatry anxiety and mood change   Musculoskeletal none   Pulmonary snoring   ENT ringing in ears   Endocrine frequent urination   Hematological none     Employment:  She currently works full time in packaging    She works 5 days per week from 7am to 3pm     Sleep Schedule:       Bedtime:  10:00pm, she watches TV or uses her phone until she falls asleep      Latency:  3 hours      Wakeup time:  5:00-5:30 with the use of an alarm    Awakenings:       Frequency:  1-2 times per night      Causes: For urination      Duration:  Sometimes returns to sleep easily and sometimes she is unable to return to sleep at all    Daytime Sleepiness / Inappropriate Sleep:       Most severe: In the morning when she needs to go to work and after 1:00pm       Naps :  She does not take naps due to schedule not allowing her to nap      Inappropriate drowsiness / sleep:  She does not become drowsy with sedentary activities    Snoring:  She has been told she snore    Apnea: No witnessed apnea    Change in Weight:  She lost 5 lbs over the past month with changes to diet    Restless Leg Syndrome:  She does not experience clinical symptoms consistent with this diagnosis     Other Complaints:  She denies sleep walking, sleep talking, sleep paralysis or hallucinations surrounding sleep  She reports bruxism  She awakens with headaches at times  Social History:      Caffeine:  Approximately 10 ounces of coffee daily       Tobacco:   reports that she has never smoked  She has never used smokeless tobacco        Alcohol:   reports that she drinks alcohol  Rare use of alcohol      Drugs:   reports that she does not use drugs  The review of systems and following portions of the patient's history were reviewed and updated as appropriate: allergies, current medications, past family history, past medical history, past social history, past surgical history and problem list         Objective:       Vitals:    01/08/20 1000   BP: 122/82   Pulse: 88   Weight: 104 kg (229 lb)   Height: 5' 5" (1 651 m)     Body mass index is 38 11 kg/m²  [place] Sleepiness Scale:  Total score: 1      Current Outpatient Medications:     acetaminophen (TYLENOL 8 HOUR ARTHRITIS PAIN) 650 mg CR tablet, Take 1 tablet (650 mg total) by mouth every 8 (eight) hours as needed for moderate pain or headaches, Disp: 90 tablet, Rfl: 0    aspirin-acetaminophen-caffeine (EXCEDRIN MIGRAINE) 250-250-65 MG per tablet, Take 1 tablet by mouth as needed, Disp: , Rfl:     atorvastatin (LIPITOR) 20 mg tablet, Take 1 tablet (20 mg total) by mouth daily, Disp: 90 tablet, Rfl: 0    Cholecalciferol (VITAMIN D3) 20 MCG (800 UNIT) TABS, Take 1 tablet by mouth daily, Disp: 90 tablet, Rfl: 0    pantoprazole (PROTONIX) 40 mg tablet, Take 1 tablet (40 mg total) by mouth daily, Disp: 90 tablet, Rfl: 0    zolpidem (AMBIEN) 10 mg tablet, Take one tablet at lights out on the night of the sleep study, Disp: 1 tablet, Rfl: 1    Physical Exam  General Appearance:   Alert, cooperative, no distress, appears stated age, obese  She became tearful during history taking when discussing overwhelming family responsibilities  Head:   Normocephalic, without obvious abnormality, atraumatic     Eyes:   PERRL, conjunctiva/corneas clear, EOM's intact          Nose:  Nares normal, septum midline, mucosa normal, no drainage or sinus tenderness           Throat:  Lips, teeth and gums normal; tongue normal size and  shape and midline in position; mucosa moist and mildly redundant bilaterally, uvula normal in size, tonsils normal, Mallampati class 4       Neck:  Supple, symmetrical, trachea midline, no adenopathy; Thyroid: No enlargement, tenderness or nodules; no carotid bruit or JVD     Lungs:      Clear to auscultation bilaterally, respirations unlabored     Heart:   Regular rate and rhythm, S1 and S2 normal, no murmur, rub or gallop       Extremities:  Extremities normal, atraumatic, no cyanosis or edema       Skin:  Skin color, texture, turgor normal, no rashes or lesions       Neurologic:  No focal deficits noted     Sleep Study Results:  No prior sleep studies      ASSESSMENT / PLAN     1  At risk for obstructive sleep apnea  Ambulatory referral to Sleep Medicine    Diagnostic Sleep Study    zolpidem (AMBIEN) 10 mg tablet    CPAP Study   2  Class 2 obesity due to excess calories with body mass index (BMI) of 38 0 to 38 9 in adult, unspecified whether serious comorbidity present     3  Inadequate sleep hygiene           Counseling / Coordination of Care  Total clinic time spent today 60 minutes  Greater than 50% of total time was spent with the patient and / or family counseling and / or coordination of care  A description of the counseling / coordination of care:     diagnostic results, instructions for management, risk factor reductions, prognosis, patient and family education, impressions, risks and benefits of treatment options and importance of compliance with treatment    Today we discussed the anatomy and physiology of the upper airway  I pointed out how changes in this region can result in both snoring and abnormal breathing events including apneas and hypopneas  I explained the most common co-morbidities of untreated sleep apnea  After this we talked about some forms of treatment including weight loss, application of positive airway pressure, mandibular advancement devices and surgery  In order to evaluate the possibility of Obstructive Sleep Apnea as a cause of the patient's symptoms, a test will be completed to identify the presence or absence of abnormal nocturnal breathing  This will consist of either a diagnostic polysomnogram  Zolpidem 10mg tablet has been prescribed to use on the night of the sleep study  If significant abnormal nocturnal breathing is detected, nasal CPAP will be titrated to find the optimum pressure needed to maintain upper airway patency during sleep  We also discussed the importance of improving sleep hygiene and overall hours of sleep  She may use melatonin at bedtime, however, the ideal time to take the supplement is 9-10 hours prior to wake up time, which will not work with her current family schedule    Following testing, the patient will return to the [facility] for a review of the test results and to discuss possible treatment options  The following instructions have been given to the patient today:    Patient Instructions   1  Schedule diagnostic sleep study and schedule cpap titration study, if needed -   Bring zolpidem with you to take on the night of the sleep study - there is one refill, in case you need the second study  2  Schedule follow up visit to review study results  3  Schedule DME appointment for CPAP equipment, if needed  4  Schedule follow up visit for CPAP compliance and recheck        Nursing Support:  When: Monday through Friday 7A-5PM except holidays  Where: Our direct line is [phone]  If you are having a true emergency please call 911  In the event that the line is busy or it is after hours please leave a voice message and we will return your call  Please speak clearly, leaving your full name, birth date, best number to reach you and the reason for your call  Medication refills: We will need the name of the medication, the dosage, the ordering provider, whether you get a 30 or 90 day refill, and the pharmacy name and address  Medications will be ordered by the provider only  Nurses cannot call in prescriptions  Please allow 7 days for medication refills  Physician requested updates: If your provider requested that you call with an update after starting medication, please be ready to provide us the medication and dosage, what time you take your medication, the time you attempt to fall asleep, time you fall asleep, when you wake up, and what time you get out of bed  Sleep Study Results: We will contact you with sleep study results and/or next steps after the physician has reviewed your testing        Cordell Villegas, [address] [place] [address]

## 2020-01-08 NOTE — PATIENT INSTRUCTIONS
1  Schedule diagnostic sleep study and schedule cpap titration study, if needed -   Bring zolpidem with you to take on the night of the sleep study - there is one refill, in case you need the second study  2  Schedule follow up visit to review study results  3  Schedule DME appointment for CPAP equipment, if needed  4  Schedule follow up visit for CPAP compliance and recheck        Nursing Support:  When: Monday through Friday 7A-5PM except holidays  Where: Our direct line is [phone]  If you are having a true emergency please call 911  In the event that the line is busy or it is after hours please leave a voice message and we will return your call  Please speak clearly, leaving your full name, birth date, best number to reach you and the reason for your call  Medication refills: We will need the name of the medication, the dosage, the ordering provider, whether you get a 30 or 90 day refill, and the pharmacy name and address  Medications will be ordered by the provider only  Nurses cannot call in prescriptions  Please allow 7 days for medication refills  Physician requested updates: If your provider requested that you call with an update after starting medication, please be ready to provide us the medication and dosage, what time you take your medication, the time you attempt to fall asleep, time you fall asleep, when you wake up, and what time you get out of bed  Sleep Study Results: We will contact you with sleep study results and/or next steps after the physician has reviewed your testing

## 2020-01-14 DIAGNOSIS — K21.9 GASTROESOPHAGEAL REFLUX DISEASE WITHOUT ESOPHAGITIS: ICD-10-CM

## 2020-01-14 RX ORDER — PANTOPRAZOLE SODIUM 40 MG/1
TABLET, DELAYED RELEASE ORAL
Qty: 90 TABLET | Refills: 0 | Status: SHIPPED | OUTPATIENT
Start: 2020-01-14 | End: 2020-02-12 | Stop reason: ALTCHOICE

## 2020-01-29 ENCOUNTER — TELEPHONE (OUTPATIENT)
Dept: FAMILY MEDICINE CLINIC | Facility: CLINIC | Age: 60
End: 2020-01-29

## 2020-01-29 NOTE — TELEPHONE ENCOUNTER
Form was dropped by patient, will be forwarded to provider at assigned delivery time       [facility](CAME THROUGH THE MAIL)

## 2020-02-12 ENCOUNTER — OFFICE VISIT (OUTPATIENT)
Dept: FAMILY MEDICINE CLINIC | Facility: CLINIC | Age: 60
End: 2020-02-12

## 2020-02-12 VITALS
TEMPERATURE: 97.6 F | SYSTOLIC BLOOD PRESSURE: 122 MMHG | BODY MASS INDEX: 38.97 KG/M2 | WEIGHT: 233.9 LBS | HEIGHT: 65 IN | OXYGEN SATURATION: 93 % | HEART RATE: 90 BPM | DIASTOLIC BLOOD PRESSURE: 72 MMHG

## 2020-02-12 DIAGNOSIS — Z12.39 BREAST CANCER SCREENING: ICD-10-CM

## 2020-02-12 DIAGNOSIS — I10 ESSENTIAL HYPERTENSION: Primary | ICD-10-CM

## 2020-02-12 DIAGNOSIS — K21.9 GASTROESOPHAGEAL REFLUX DISEASE WITHOUT ESOPHAGITIS: ICD-10-CM

## 2020-02-12 PROCEDURE — 99213 OFFICE O/P EST LOW 20 MIN: CPT | Performed by: FAMILY MEDICINE

## 2020-02-12 PROCEDURE — 1036F TOBACCO NON-USER: CPT | Performed by: FAMILY MEDICINE

## 2020-02-12 PROCEDURE — 3074F SYST BP LT 130 MM HG: CPT | Performed by: FAMILY MEDICINE

## 2020-02-12 PROCEDURE — 3008F BODY MASS INDEX DOCD: CPT | Performed by: FAMILY MEDICINE

## 2020-02-12 PROCEDURE — 3078F DIAST BP <80 MM HG: CPT | Performed by: FAMILY MEDICINE

## 2020-02-12 RX ORDER — FAMOTIDINE 20 MG/1
20 TABLET, FILM COATED ORAL DAILY
Qty: 90 TABLET | Refills: 0 | Status: SHIPPED | OUTPATIENT
Start: 2020-02-12 | End: 2022-05-11

## 2020-02-12 NOTE — PROGRESS NOTES
Assessment/Plan:    Hypertension  - Goal per [facility] VIII < 140/90  - At goal, off medication   - Restrict salt intake to 2 4 g/day  - CMP unremarkable  Urine MCR wnl  - Although ASCVD risk remains under 7 5 % will continue statin due to questionable H/O TIA on chart which patient confirms          Diagnoses and all orders for this visit:    Essential hypertension    Breast cancer screening  -     Mammo screening bilateral w cad; Future    Gastroesophageal reflux disease without esophagitis  -     famotidine (PEPCID) 20 mg tablet; Take 1 tablet (20 mg total) by mouth daily    Other orders  -     Cancel: Ambulatory referral to Gastroenterology; Future          Subjective:      Patient ID: Rafy Rea is a 63 y o  female  63 y o female with PMH of HTN presents for f/u  Was on nifedipine historically and has been off medication for over a year now  Denies any chest pain/ palpitation/ headache/ SOB/ leg swelling  No new complaints at this visit  The following portions of the patient's history were reviewed and updated as appropriate: She  has a past medical history of Peptic ulcer (12/1/2017) and Temporary cerebral vascular dysfunction (11/4/2014)  Patient Active Problem List    Diagnosis Date Noted    At risk for obstructive sleep apnea 01/08/2020    Inadequate sleep hygiene 01/08/2020    Chronic nonintractable headache 10/17/2019    Colon polyp 01/12/2018    Allergic rhinitis 08/12/2016    Vitamin D deficiency 08/12/2016    Sleep disorder 08/12/2016    Anxiety 11/04/2014    Depression 11/04/2014    Gastroesophageal reflux disease 11/04/2014    Hypertension 11/04/2014    Hyperlipidemia 11/04/2014    Transient ischemic attack 11/04/2014    Obesity 09/24/2014    Migraine with aura 05/07/2012     She  has no past surgical history on file  Her family history is not on file  She  reports that she has never smoked   She has never used smokeless tobacco  She reports that she drinks alcohol  She reports that she does not use drugs  Current Outpatient Medications   Medication Sig Dispense Refill    atorvastatin (LIPITOR) 20 mg tablet Take 1 tablet (20 mg total) by mouth daily 90 tablet 0    Cholecalciferol (VITAMIN D3) 20 MCG (800 UNIT) TABS Take 1 tablet by mouth daily 90 tablet 0    acetaminophen (TYLENOL 8 HOUR ARTHRITIS PAIN) 650 mg CR tablet Take 1 tablet (650 mg total) by mouth every 8 (eight) hours as needed for moderate pain or headaches (Patient not taking: Reported on 2/12/2020) 90 tablet 0    aspirin-acetaminophen-caffeine (EXCEDRIN MIGRAINE) 250-250-65 MG per tablet Take 1 tablet by mouth as needed      famotidine (PEPCID) 20 mg tablet Take 1 tablet (20 mg total) by mouth daily 90 tablet 0    zolpidem (AMBIEN) 10 mg tablet Take one tablet at lights out on the night of the sleep study (Patient not taking: Reported on 2/12/2020) 1 tablet 1     No current facility-administered medications for this visit  Current Outpatient Medications on File Prior to Visit   Medication Sig    atorvastatin (LIPITOR) 20 mg tablet Take 1 tablet (20 mg total) by mouth daily    Cholecalciferol (VITAMIN D3) 20 MCG (800 UNIT) TABS Take 1 tablet by mouth daily    [DISCONTINUED] pantoprazole (PROTONIX) 40 mg tablet TAKE 1 TABLET BY MOUTH DAILY    acetaminophen (TYLENOL 8 HOUR ARTHRITIS PAIN) 650 mg CR tablet Take 1 tablet (650 mg total) by mouth every 8 (eight) hours as needed for moderate pain or headaches (Patient not taking: Reported on 2/12/2020)    aspirin-acetaminophen-caffeine (EXCEDRIN MIGRAINE) 250-250-65 MG per tablet Take 1 tablet by mouth as needed    zolpidem (AMBIEN) 10 mg tablet Take one tablet at lights out on the night of the sleep study (Patient not taking: Reported on 2/12/2020)     No current facility-administered medications on file prior to visit  She is allergic to aspirin and no known allergies       Review of Systems   Constitutional: Negative for chills and fever  HENT: Negative for congestion, rhinorrhea, sinus pain, sore throat and trouble swallowing  Eyes: Negative for visual disturbance  Respiratory: Negative for cough, shortness of breath and wheezing  Cardiovascular: Negative for chest pain, palpitations and leg swelling  Gastrointestinal: Negative for abdominal pain, blood in stool, constipation, diarrhea, nausea and vomiting  Genitourinary: Negative for dysuria and hematuria  Musculoskeletal: Negative for back pain and gait problem  Skin: Negative for rash  Neurological: Negative for seizures, weakness and headaches  Hematological: Negative for adenopathy  Psychiatric/Behavioral: The patient is not nervous/anxious  Objective:      /72 (BP Location: Left arm, Patient Position: Sitting, Cuff Size: Large)   Pulse 90   Temp 97 6 °F (36 4 °C) (Temporal)   Ht 5' 5" (1 651 m)   Wt 106 kg (233 lb 14 4 oz)   SpO2 93%   BMI 38 92 kg/m²          Physical Exam   Constitutional: She is oriented to person, place, and time  She appears well-developed and well-nourished  No distress  HENT:   Head: Normocephalic and atraumatic  Right Ear: External ear normal    Left Ear: External ear normal    Mouth/Throat: Oropharynx is clear and moist  No oropharyngeal exudate  Eyes: Conjunctivae and EOM are normal  Right eye exhibits no discharge  Left eye exhibits no discharge  Neck: Normal range of motion  Cardiovascular: Normal rate, regular rhythm and normal heart sounds  Exam reveals no gallop and no friction rub  No murmur heard  Pulmonary/Chest: Effort normal  No stridor  No respiratory distress  She has no wheezes  She has no rales  Abdominal: Soft  She exhibits no distension  There is no tenderness  There is no rebound and no guarding  Musculoskeletal: She exhibits no edema or tenderness  Lymphadenopathy:     She has no cervical adenopathy  Neurological: She is alert and oriented to person, place, and time   She exhibits normal muscle tone  Skin: Skin is warm  No rash noted  She is not diaphoretic  Psychiatric: She has a normal mood and affect  Vitals reviewed

## 2020-02-13 NOTE — ASSESSMENT & PLAN NOTE
- Goal per [facility] < 140/90  - At goal, off medication   - Restrict salt intake to 2 4 g/day  - CMP unremarkable   Urine MCR wnl  - Although ASCVD risk remains under 7 5 % will continue statin due to questionable H/O TIA on chart which patient confirms

## 2020-02-20 ENCOUNTER — HOSPITAL ENCOUNTER (OUTPATIENT)
Dept: SLEEP CENTER | Facility: CLINIC | Age: 60
Discharge: HOME | End: 2020-02-20
Payer: COMMERCIAL

## 2020-02-20 DIAGNOSIS — Z91.89 AT RISK FOR OBSTRUCTIVE SLEEP APNEA: ICD-10-CM

## 2020-02-20 DIAGNOSIS — E78.2 MIXED HYPERLIPIDEMIA: ICD-10-CM

## 2020-02-20 PROCEDURE — 95810 POLYSOM 6/> YRS 4/> PARAM: CPT

## 2020-02-20 RX ORDER — ATORVASTATIN CALCIUM 20 MG/1
TABLET, FILM COATED ORAL
Qty: 90 TABLET | Refills: 0 | Status: SHIPPED | OUTPATIENT
Start: 2020-02-20 | End: 2020-05-18 | Stop reason: SDUPTHER

## 2020-02-21 NOTE — PROGRESS NOTES
Sleep Study Documentation    Pre-Sleep Study       Sleep testing procedure explained to patient:YES    Patient napped prior to study:NO    [address] [place] worker after 12PM   Caffeine use:NO    Alcohol:Dayshift workers after 5PM: Alcohol use:NO    Typical day for patient:YES       Study Documentation    Sleep Study Indications:  Snoring, BMI>30, unrefreshing sleep  Sleep Study: Diagnostic   Snore: Moderate  Supplemental O2: no    O2 flow rate (L/min) range 0  O2 flow rate (L/min) final 0  Minimum SaO2  84 %  Baseline SaO2  95 5 %            EKG abnormalities: no     EEG abnormalities: no    Sleep Study Recorded < 2 hours: N/A    Sleep Study Recorded > 2 hours but incomplete study: N/A    Sleep Study Recorded 6 hours but no sleep obtained: NO           Post-Sleep Study    Medication used at bedtime or during sleep study:NO    Patient reports time it took to fall asleep:20 to 30 minutes    Patient reports waking up during study:1 to 2 times  Patient reports returning to sleep in 10 to 30 minutes  Patient reports sleeping 4 to 6 hours with dreaming  Patient reports sleep during study:typical    Patient rated sleepiness: Somewhat sleepy or tired    PAP treatment:no

## 2020-02-24 ENCOUNTER — TELEPHONE (OUTPATIENT)
Dept: SLEEP CENTER | Facility: CLINIC | Age: 60
End: 2020-02-24

## 2020-02-24 NOTE — TELEPHONE ENCOUNTER
Via  # [id number]  Message left for patient to call back for results  No ANUPAM  Left message that CPAP study and DME set up appointment is canceled      Left message date time and place of follow up appointment

## 2020-05-18 ENCOUNTER — TELEMEDICINE (OUTPATIENT)
Dept: FAMILY MEDICINE CLINIC | Facility: CLINIC | Age: 60
End: 2020-05-18

## 2020-05-18 DIAGNOSIS — R76.11 TUBERCULIN SKIN TEST (TST) POSITIVE: ICD-10-CM

## 2020-05-18 DIAGNOSIS — E78.2 MIXED HYPERLIPIDEMIA: ICD-10-CM

## 2020-05-18 DIAGNOSIS — Z02.89 ENCOUNTER FOR PHYSICAL EXAMINATION RELATED TO EMPLOYMENT: ICD-10-CM

## 2020-05-18 DIAGNOSIS — E55.9 VITAMIN D INSUFFICIENCY: ICD-10-CM

## 2020-05-18 DIAGNOSIS — I10 ESSENTIAL HYPERTENSION: Primary | ICD-10-CM

## 2020-05-18 PROBLEM — R73.03 PREDIABETES: Status: ACTIVE | Noted: 2020-05-18

## 2020-05-18 PROCEDURE — 99442 PR PHYS/QHP TELEPHONE EVALUATION 11-20 MIN: CPT | Performed by: FAMILY MEDICINE

## 2020-05-18 RX ORDER — ATORVASTATIN CALCIUM 20 MG/1
20 TABLET, FILM COATED ORAL DAILY
Qty: 90 TABLET | Refills: 0 | Status: SHIPPED | OUTPATIENT
Start: 2020-05-18 | End: 2021-05-24

## 2020-05-19 ENCOUNTER — TELEPHONE (OUTPATIENT)
Dept: FAMILY MEDICINE CLINIC | Facility: CLINIC | Age: 60
End: 2020-05-19

## 2020-05-21 ENCOUNTER — HOSPITAL ENCOUNTER (OUTPATIENT)
Dept: RADIOLOGY | Facility: HOSPITAL | Age: 60
Discharge: HOME | End: 2020-05-21
Payer: COMMERCIAL

## 2020-05-21 DIAGNOSIS — Z02.89 ENCOUNTER FOR PHYSICAL EXAMINATION RELATED TO EMPLOYMENT: ICD-10-CM

## 2020-05-21 DIAGNOSIS — R76.11 TUBERCULIN SKIN TEST (TST) POSITIVE: ICD-10-CM

## 2020-05-21 PROCEDURE — 71046 X-RAY EXAM CHEST 2 VIEWS: CPT

## 2020-05-28 ENCOUNTER — TELEPHONE (OUTPATIENT)
Dept: FAMILY MEDICINE CLINIC | Facility: CLINIC | Age: 60
End: 2020-05-28

## 2020-06-22 ENCOUNTER — HOSPITAL ENCOUNTER (OUTPATIENT)
Dept: MAMMOGRAPHY | Facility: CLINIC | Age: 60
Discharge: HOME | End: 2020-06-22
Payer: COMMERCIAL

## 2020-06-22 VITALS — WEIGHT: 230 LBS | BODY MASS INDEX: 40.75 KG/M2 | HEIGHT: 63 IN

## 2020-06-22 DIAGNOSIS — Z12.31 ENCOUNTER FOR SCREENING MAMMOGRAM FOR MALIGNANT NEOPLASM OF BREAST: ICD-10-CM

## 2020-06-22 PROCEDURE — 77063 BREAST TOMOSYNTHESIS BI: CPT

## 2020-06-22 PROCEDURE — 77067 SCR MAMMO BI INCL CAD: CPT

## 2020-10-21 ENCOUNTER — OPTICAL OFFICE (OUTPATIENT)
Dept: URBAN - METROPOLITAN AREA CLINIC 143 | Facility: CLINIC | Age: 60
Setting detail: OPHTHALMOLOGY
End: 2020-10-21

## 2020-10-21 ENCOUNTER — DOCTOR'S OFFICE (OUTPATIENT)
Dept: URBAN - METROPOLITAN AREA CLINIC 136 | Facility: CLINIC | Age: 60
Setting detail: OPHTHALMOLOGY
End: 2020-10-21
Payer: COMMERCIAL

## 2020-10-21 VITALS — HEIGHT: 55 IN

## 2020-10-21 DIAGNOSIS — H52.223: ICD-10-CM

## 2020-10-21 DIAGNOSIS — H52.7: ICD-10-CM

## 2020-10-21 DIAGNOSIS — H52.4: ICD-10-CM

## 2020-10-21 DIAGNOSIS — H52.03: ICD-10-CM

## 2020-10-21 PROBLEM — H25.13 NUCLEAR CATARACT OU ; BOTH EYES: Status: ACTIVE | Noted: 2018-05-15

## 2020-10-21 PROCEDURE — 92015 DETERMINE REFRACTIVE STATE: CPT | Performed by: OPTOMETRIST

## 2020-10-21 PROCEDURE — V2799 MISC VISION ITEM OR SERVICE: HCPCS | Performed by: OPHTHALMOLOGY

## 2020-10-21 PROCEDURE — 92014 COMPRE OPH EXAM EST PT 1/>: CPT | Performed by: OPTOMETRIST

## 2020-10-21 ASSESSMENT — REFRACTION_CURRENTRX
OS_CYLINDER: -0.75
OD_OVR_VA: 20/
OS_SPHERE: +1.25
OS_AXIS: 075
OS_OVR_VA: 20/
OD_SPHERE: +0.50
OD_CYLINDER: SPH
OS_ADD: +2.00
OS_VPRISM_DIRECTION: PROGS
OD_ADD: +2.00
OD_VPRISM_DIRECTION: PROGS

## 2020-10-21 ASSESSMENT — REFRACTION_MANIFEST
OS_VA2: 20/20
OD_CYLINDER: SPH
OD_SPHERE: +0.75
OU_VA: 20/20
OS_CYLINDER: -0.75
OS_VA1: 20/20
OD_VA1: 20/20
OD_VA2: 20/20
OS_AXIS: 075
OS_ADD: +2.25
OS_SPHERE: +1.50
OD_ADD: +2.25

## 2020-10-21 ASSESSMENT — REFRACTION_AUTOREFRACTION
OD_AXIS: 119
OS_CYLINDER: -0.50
OS_SPHERE: +1.50
OD_CYLINDER: -0.50
OD_SPHERE: +1.00
OS_AXIS: 074

## 2020-10-21 ASSESSMENT — SPHEQUIV_DERIVED
OS_SPHEQUIV: 1.125
OD_SPHEQUIV: 0.75
OS_SPHEQUIV: 1.25

## 2020-10-21 ASSESSMENT — TONOMETRY
OS_IOP_MMHG: 15
OD_IOP_MMHG: 15

## 2020-10-21 ASSESSMENT — VISUAL ACUITY
OD_BCVA: 20/25
OS_BCVA: 20/20-1

## 2020-10-21 ASSESSMENT — CONFRONTATIONAL VISUAL FIELD TEST (CVF)
OS_FINDINGS: FULL
OD_FINDINGS: FULL

## 2020-11-12 DIAGNOSIS — K21.9 GASTROESOPHAGEAL REFLUX DISEASE WITHOUT ESOPHAGITIS: ICD-10-CM

## 2020-11-17 RX ORDER — PANTOPRAZOLE SODIUM 40 MG/1
TABLET, DELAYED RELEASE ORAL
Qty: 90 TABLET | Refills: 0 | Status: SHIPPED | OUTPATIENT
Start: 2020-11-17 | End: 2021-02-17

## 2021-01-07 ENCOUNTER — TELEMEDICINE (OUTPATIENT)
Dept: FAMILY MEDICINE CLINIC | Facility: CLINIC | Age: 61
End: 2021-01-07

## 2021-01-07 DIAGNOSIS — B34.9 VIRAL INFECTION, UNSPECIFIED: ICD-10-CM

## 2021-01-07 DIAGNOSIS — Z20.822 EXPOSURE TO COVID-19 VIRUS: ICD-10-CM

## 2021-01-07 PROCEDURE — 99441 PR PHYS/QHP TELEPHONE EVALUATION 5-10 MIN: CPT | Performed by: PHYSICIAN ASSISTANT

## 2021-01-07 NOTE — PROGRESS NOTES
COVID-19 Virtual Visit     Assessment/Plan:    Problem List Items Addressed This Visit     None      Visit Diagnoses     Exposure to COVID-19 virus        Viral infection, unspecified             Disposition:         - Patient completed COVID-19 testing @ [facility]  earlier today  Results are pending    - Advised patient to self quarantine at home until test is resulted  - Advised patient to continue to follow good infection control measures including frequent hand washing, wearing a mask when around others, and to practice social distancing     - Advised patient to call the office or report to ED if symptoms persist, worsen, or new symptoms arise such as worsening shortness of breath or difficulty breathing  I have spent 10 minutes directly with the patient  Greater than 50% of this time was spent in counseling/coordination of care regarding: patient and family education and risk factor reductions  Encounter provider Santo Pham PA-C    Provider located at [address] [place] [id number] [phone]    Recent Visits  No visits were found meeting these conditions  Showing recent visits within past 7 days and meeting all other requirements     Today's Visits  Date Type Provider Dept   01/07/21 Telemedicine Nayeli Lawton PA-C  [facility]   Showing today's visits and meeting all other requirements     Future Appointments  No visits were found meeting these conditions  Showing future appointments within next 150 days and meeting all other requirements        Patient agrees to participate in a virtual check in via telephone or video visit instead of presenting to the office to address urgent/immediate medical needs  Patient is aware this is a billable service  After connecting through Telephone, the patient was identified by name and date of birth   [facility]  was informed that this was a telemedicine visit and that the exam was being conducted confidentially over secure lines  My office door was closed  No one else was in the room  [facility]  acknowledged consent and understanding of privacy and security of the telemedicine visit  I informed the patient that I have reviewed her record in Epic and presented the opportunity for her to ask any questions regarding the visit today  The patient agreed to participate  It was my intent to perform this visit via video technology but the patient was not able to do a video connection so the visit was completed via audio telephone only  Subjective:   [facility]  is a 62 y o  female who is concerned about COVID-19  Patient's symptoms include fever (last fever 3-4 days), chills and cough  Patient denies anosmia, loss of taste, shortness of breath, abdominal pain, nausea, vomiting and diarrhea  Date of symptom onset: 1/4/2021  Date of exposure: 1/6/2021    Exposure:   Contact with a person who is under investigation (PUI) for or who is positive for COVID-19 within the last 14 days?: Yes    Hospitalized recently for fever and/or lower respiratory symptoms?: No      Currently a healthcare worker that is involved in direct patient care?: No      Works in a special setting where the risk of COVID-19 transmission may be high? (this may include long-term care, correctional and nursing home facilities; homeless shelters; assisted-living facilities and group homes ): No      Resident in a special setting where the risk of COVID-19 transmission may be high? (this may include long-term care, correctional and nursing home facilities; homeless shelters; assisted-living facilities and group homes ): No      Patient notes 3 of her relatives and her mother have tested positive for COVID-19  Patient notes she already had COVID-19 testing completed earleir today at [facility]       No results found for: Sandee Quiroz, [address] [facility], [address] [facility], [place] [address]  Past Medical History:   Diagnosis Date    Peptic ulcer 12/1/2017    Temporary cerebral vascular dysfunction 11/4/2014     History reviewed  No pertinent surgical history  Current Outpatient Medications   Medication Sig Dispense Refill    acetaminophen (TYLENOL 8 HOUR ARTHRITIS PAIN) 650 mg CR tablet Take 1 tablet (650 mg total) by mouth every 8 (eight) hours as needed for moderate pain or headaches (Patient not taking: Reported on 2/12/2020) 90 tablet 0    aspirin-acetaminophen-caffeine (EXCEDRIN MIGRAINE) 250-250-65 MG per tablet Take 1 tablet by mouth as needed      atorvastatin (LIPITOR) 20 mg tablet Take 1 tablet (20 mg total) by mouth daily 90 tablet 0    Cholecalciferol (VITAMIN D3) 20 MCG (800 UNIT) TABS Take 1 tablet by mouth daily 90 tablet 0    famotidine (PEPCID) 20 mg tablet Take 1 tablet (20 mg total) by mouth daily 90 tablet 0    pantoprazole (PROTONIX) 40 mg tablet TAKE 1 TABLET BY MOUTH DAILY 90 tablet 0    zolpidem (AMBIEN) 10 mg tablet Take one tablet at lights out on the night of the sleep study (Patient not taking: Reported on 2/12/2020) 1 tablet 1     No current facility-administered medications for this visit  Allergies   Allergen Reactions    Aspirin GI Intolerance    No Known Allergies        Review of Systems   Constitutional: Positive for chills and fever (last fever 3-4 days)  Respiratory: Positive for cough  Negative for shortness of breath  Gastrointestinal: Negative for abdominal pain, diarrhea, nausea and vomiting  Objective: There were no vitals filed for this visit  Physical Exam  Constitutional:       General: She is not in acute distress  Pulmonary:      Effort: Pulmonary effort is normal  No respiratory distress  Neurological:      Mental Status: She is alert and oriented to person, place, and time  Psychiatric:         Speech: Speech normal           - Utilized [employer] phones for translation      VIRTUAL VISIT Bob acknowledges that she has consented to an online visit or consultation  She understands that the online visit is based solely on information provided by her, and that, in the absence of a face-to-face physical evaluation by the physician, the diagnosis she receives is both limited and provisional in terms of accuracy and completeness  This is not intended to replace a full medical face-to-face evaluation by the physician  [facility]  understands and accepts these terms

## 2021-02-13 DIAGNOSIS — K21.9 GASTROESOPHAGEAL REFLUX DISEASE WITHOUT ESOPHAGITIS: ICD-10-CM

## 2021-02-17 RX ORDER — PANTOPRAZOLE SODIUM 40 MG/1
TABLET, DELAYED RELEASE ORAL
Qty: 90 TABLET | Refills: 0 | Status: SHIPPED | OUTPATIENT
Start: 2021-02-17 | End: 2022-05-11

## 2021-04-21 ENCOUNTER — OFFICE VISIT (OUTPATIENT)
Dept: FAMILY MEDICINE CLINIC | Facility: CLINIC | Age: 61
End: 2021-04-21

## 2021-04-21 VITALS
WEIGHT: 231 LBS | SYSTOLIC BLOOD PRESSURE: 130 MMHG | BODY MASS INDEX: 40.93 KG/M2 | RESPIRATION RATE: 18 BRPM | TEMPERATURE: 97.6 F | HEIGHT: 63 IN | DIASTOLIC BLOOD PRESSURE: 88 MMHG | HEART RATE: 78 BPM | OXYGEN SATURATION: 98 %

## 2021-04-21 DIAGNOSIS — E55.9 VITAMIN D DEFICIENCY: Primary | ICD-10-CM

## 2021-04-21 DIAGNOSIS — Z12.31 BREAST CANCER SCREENING BY MAMMOGRAM: ICD-10-CM

## 2021-04-21 DIAGNOSIS — E78.5 HYPERLIPIDEMIA, UNSPECIFIED HYPERLIPIDEMIA TYPE: ICD-10-CM

## 2021-04-21 DIAGNOSIS — R73.03 PREDIABETES: ICD-10-CM

## 2021-04-21 DIAGNOSIS — Z00.00 PREVENTATIVE HEALTH CARE: ICD-10-CM

## 2021-04-21 DIAGNOSIS — E66.01 MORBID OBESITY (HCC): ICD-10-CM

## 2021-04-21 PROCEDURE — 99396 PREV VISIT EST AGE 40-64: CPT | Performed by: PHYSICIAN ASSISTANT

## 2021-04-21 NOTE — PROGRESS NOTES
Assessment/Plan: Morbid obesity ([facility] )  BMI Counseling: Body mass index is 40 92 kg/m²  The BMI is above normal  Nutrition recommendations include 3-5 servings of fruits/vegetables daily and increasing intake of lean protein  Problem List Items Addressed This Visit        Other    Hyperlipidemia    Relevant Orders    Lipid panel    CBC and differential    Morbid obesity ([facility] [address] )     BMI Counseling: Body mass index is 40 92 kg/m²  The BMI is above normal  Nutrition recommendations include 3-5 servings of fruits/vegetables daily and increasing intake of lean protein  Vitamin D deficiency - Primary    Relevant Orders    Vitamin D 25 hydroxy    CBC and differential    Prediabetes    Relevant Orders    Comprehensive metabolic panel    Hemoglobin A1c (w/out EAG)    Lipid panel    CBC and differential      Other Visit Diagnoses     Preventative health care        Breast cancer screening by mammogram        Relevant Orders    Mammo screening bilateral w 3d & cad            Subjective:      Patient ID: Batsheva Laguerre is a 62 y o  female  HPI 62year old F here for yearly physical   She is doing well  She in need of labs  seh is not taking any medication currently  She works in home care  April 30 she had covid vaccine  Diet: tries to eat well  Exercise:sedentary  Sleep: no probles        Immunizations:up to date  Dental:thinks about due  Vision:october, wears glasses  Hearing:no complaints  Output:no complaints  The following portions of the patient's history were reviewed and updated as appropriate: She  has a past medical history of Peptic ulcer (12/1/2017) and Temporary cerebral vascular dysfunction (11/4/2014)    She   Patient Active Problem List    Diagnosis Date Noted    Prediabetes 05/18/2020    Encounter for physical examination related to employment 05/18/2020    ANUPAM (obstructive sleep apnea)     At risk for obstructive sleep apnea 01/08/2020    Inadequate sleep hygiene 2020    Chronic nonintractable headache 10/17/2019    Colon polyp 2018    Allergic rhinitis 2016    Vitamin D deficiency 2016    Sleep disorder 2016    Anxiety 2014    Depression 2014    Gastroesophageal reflux disease 2014    Hypertension 2014    Hyperlipidemia 2014    Transient ischemic attack 2014    Morbid obesity ([address] ) 2014    Migraine with aura 2012     She  has a past surgical history that includes  section  Her family history includes Hypertension in her mother; No Known Problems in her maternal aunt, maternal aunt, maternal aunt, maternal aunt, maternal aunt, maternal aunt, maternal aunt, maternal aunt, maternal aunt, maternal aunt, maternal aunt, maternal aunt, paternal aunt, paternal aunt, paternal aunt, paternal aunt, sister, sister, sister, sister, sister, and sister  She  reports that she has never smoked  She has never used smokeless tobacco  She reports current alcohol use  She reports that she does not use drugs  Current Outpatient Medications   Medication Sig Dispense Refill    aspirin-acetaminophen-caffeine (EXCEDRIN MIGRAINE) 250-250-65 MG per tablet Take 1 tablet by mouth as needed      atorvastatin (LIPITOR) 20 mg tablet Take 1 tablet (20 mg total) by mouth daily 90 tablet 0    Cholecalciferol (VITAMIN D3) 20 MCG (800 UNIT) TABS Take 1 tablet by mouth daily 90 tablet 0    famotidine (PEPCID) 20 mg tablet Take 1 tablet (20 mg total) by mouth daily 90 tablet 0    pantoprazole (PROTONIX) 40 mg tablet TAKE 1 TABLET BY MOUTH DAILY 90 tablet 0     No current facility-administered medications for this visit        Current Outpatient Medications on File Prior to Visit   Medication Sig    aspirin-acetaminophen-caffeine (EXCEDRIN MIGRAINE) 250-250-65 MG per tablet Take 1 tablet by mouth as needed    atorvastatin (LIPITOR) 20 mg tablet Take 1 tablet (20 mg total) by mouth daily    Cholecalciferol (VITAMIN D3) 20 MCG (800 UNIT) TABS Take 1 tablet by mouth daily    famotidine (PEPCID) 20 mg tablet Take 1 tablet (20 mg total) by mouth daily    pantoprazole (PROTONIX) 40 mg tablet TAKE 1 TABLET BY MOUTH DAILY     No current facility-administered medications on file prior to visit  She is allergic to aspirin and no known allergies       Review of Systems   Constitutional: Negative for chills and fever  HENT: Negative for ear pain and sore throat  Eyes: Negative for pain and visual disturbance  Respiratory: Negative for cough and shortness of breath  Cardiovascular: Negative for chest pain and palpitations  Gastrointestinal: Negative for abdominal pain and vomiting  Genitourinary: Negative for dysuria and hematuria  Musculoskeletal: Negative for arthralgias and back pain  Skin: Negative for color change and rash  Neurological: Negative for seizures and syncope  Psychiatric/Behavioral: Negative for dysphoric mood  All other systems reviewed and are negative  Objective:      /88 (BP Location: Left arm, Patient Position: Sitting, Cuff Size: Large)   Pulse 78   Temp 97 6 °F (36 4 °C) (Temporal)   Resp 18   Ht 5' 3" (1 6 m)   Wt 105 kg (231 lb)   SpO2 98%   BMI 40 92 kg/m²          Physical Exam  Vitals signs and nursing note reviewed  Constitutional:       Appearance: She is well-developed  She is obese  HENT:      Head: Normocephalic and atraumatic  Right Ear: External ear normal       Left Ear: External ear normal       Nose: Nose normal    Eyes:      Conjunctiva/sclera: Conjunctivae normal    Neck:      Musculoskeletal: Normal range of motion and neck supple  Thyroid: No thyromegaly  Cardiovascular:      Rate and Rhythm: Normal rate and regular rhythm  Heart sounds: Normal heart sounds  No murmur  Pulmonary:      Effort: Pulmonary effort is normal  No respiratory distress  Breath sounds: Normal breath sounds     Abdominal:      General: Bowel sounds are normal       Palpations: Abdomen is soft  There is no mass  Tenderness: There is no abdominal tenderness  There is no guarding  Musculoskeletal: Normal range of motion  Lymphadenopathy:      Cervical: No cervical adenopathy  Skin:     General: Skin is warm  Neurological:      Mental Status: She is alert and oriented to person, place, and time     Psychiatric:         Behavior: Behavior normal

## 2021-04-21 NOTE — PATIENT INSTRUCTIONS
Visita de bienestar para los adultos   CUIDADO AMBULATORIO:   Bradley visita de bienestar es cuando usted acude con un médico para que le bridger exámenes de detección de problemas de [place]  Zamora médico también le dará consejos sobre cómo mantenerse saludable  Anote etienne preguntas para que se acuerde de hacerlas  Pregunte a zamora médico con qué frecuencia debería realizarse bradley visita de bienestar  Lo que sucede en bradley visita de bienestar: Zamora médico le preguntará sobre zamora rodney y zamora historia familiar [id number] [facility]  [place] incluye presión arterial suma, enfermedades del corazón y cáncer  El médico le preguntará si tiene síntomas que le preocupen, si [facility] y Sarah de ánimo  También se le preguntará acerca del uso de medicamentos, suplementos, alimentos y alcohol  Es posible que le bridger cualquiera de lo siguiente:  · Zamora peso será revisado  Es posible que [facility] midan zamora altura para calcular zamora índice de masa corporal [facility]  [facility] indica si tiene un peso saludable  · Se verificarán zamora presión arterial y frecuencia cardíaca  También pueden revisar zamora temperatura  · Exámenes de [facility] y [facility] podría realizarse  Se podrían realizar exámenes de jerson para revisar los niveles de [facility]  Los niveles anormales de colesterol aumentan el riesgo de enfermedad del corazón y accidente cerebrovascular  Puede que también necesite bradley prueba de jerson u orina para revisar si tiene diabetes si usted está en mayor riesgo  Las pruebas de orina pueden hacerse para buscar signos de bradley infección o bradley enfermedad renal     · Un examen físico incluye la comprobación de etienne latidos del corazón y los pulmones con un estetoscopio  Zamora médico también podría revisarle la piel para buscar daños causados por el sol  · Pruebas de detección podría recomendarse  Se realiza un examen de detección para detectar enfermedades que pueden no causar síntomas   Los exámenes de [place] necesite dependen de zamora edad, [place], antecedentes familiares y hábitos de cherie  Por ejemplo, podrían recomendarle la exploración selectiva colorrectal si tiene 48 años o más  Si es [place], necesita los siguientes exámenes de detección:  · El examen de Papanicolaou se utiliza para detectar cáncer de tomeka uterino  El examen del Papanicolaou por lo general se realiza entre 3 a 5 años dependiendo de zamora edad  Puede necesitarlo más a menudo si usted ha tenido [employer] de la prueba de Papanicolaou en el pasado  Pregunte a zamora médico con qué frecuencia debería realizarse un examen de Papanicolaou  · Bunny Packer es bradley radiografía de etienne mamas para detectar cáncer de mama  Los expertos recomiendan Bird cada 2 años empezando a los 48 años de Gerardo  Es probable que usted necesite Bunny Packer a los 52 años o antes si tiene riesgo alto de cáncer de seno  Hable con zamora médico sobre cuándo debe empezar con etienne mamografías y con cuánta frecuencia las necesita  Vacunas que podría necesitar:  · Debe recibir bradley vacuna contra la influenza todos los [place] mag[place]  La vacuna contra la influenza protege de la gripe  Varios tipos de virus causan la influenza  Debido a que los virus [place] con el [place], es necesaria la producción de nuevas vacunas cada año  · Debe recibir Anitra Flurry vacuna de refuerzo contra el tétanos-difteria (Td) cada 10 años  Esta vacuna protege contra el tétanos y la difteria  El tétanos es bradley infección severa que puede causar trismo y espasmos musculares dolorosos  La difteria es bradley infección bacteriana grave que produce bradley cubierta gruesa en la parte de atrás de la boca y garganta  · Debe recibir la vacuna contra el virus del papiloma humano (VPH) si usted es jackelyn y [place] 19 y 32 años o es hombre y [place] 23 y 24 años y nunca la recibió  Esta vacuna protege contra la infección por VPH   El virus del papiloma humano o VPH es la infección más común que se transmite por contacto sexual  El virus del papiloma humano también podría provocar cáncer vaginal, del pene y del ano  · Debe recibir la vacuna antineumocócica si tiene más de 72 años  La vacuna antineumocócica es bradley inyección que se administra para protegerlo contra bradley enfermedad neumocócica  La enfermedad neumocócica es bradley infección causada por la bacteria neumocócica  La infección puede causar neumonía, meningitis o bradley infección del oído  · Debe recibir la vacuna contra la culebrilla Si tiene 61 años o más, incluso si ha tenido culebrilla antes  La vacuna contra la culebrilla (herpes zóster) es brdaley inyección usada para proteger contra el virus zóster que causa la varicela  Alexa es el mismo virus que causa la varicela  La culebrilla es un sarpullido doloroso que se desarrolla en personas que tuvieron varicela o carrillo estado expuestas al virus  Cómo comer saludable: [place] es un modelo para planear comidas sanas  Muestra los tipos y cantidades de alimentos que deberían ir en un plato  [place] y verduras representan alrededor de la mitad de zamora plato y los granos y proteínas representan la otra mitad  Se incluye bradley porción de productos lácteos al lado del plato  La cantidad de calorías y [address] de las porciones que usted necesita dependen de zamora edad, [place], peso y altura  Los ejemplos de alimentos saludables son:  · Consuma bradley variedad de verduras jerson las de color spencer oscuro, johnson y [place]  Usted también puede incluir verduras enlatadas bajas en sodio (sal) y verduras congeladas sin mantequilla ni salsas [id number]  · Consuma bradley variedad de fruta frescas , las frutas enlatadas en 100% de jugo, fruta [place] y vamshi secos  · Incluya granos enteros  Por lo menos la mitad de los granos que usted consume deben ser granos de uriah integral  Por ejemplo, panes de grano entero, pasta integral, arroz integral y cereales de grano entero jerson la gail      · Consuma bradley variedad de alimentos con proteínas jerson mariscos (pescado y crustáceos), carne magra y carne de ave sin piel (pavo y aquiles)  Ejemplos de ti magras incluyen pierna, paleta o lomo de puerco y iggy, solomillo o, lomo de res y carne [place] de res extra [place]  Otros alimentos ricos en proteínas son los huevos y sustitutos de [place], frijoles, chícharos, productos de soya, nueces y semillas  · Elija productos lácteos bajos en grasa [employer] o del 1% o yogur, queso y requesón bajos en grasa  · Limite las grasas poco saludables, jerson la [place], la margarina dura y la [place]  Ejercicio: Realice bradley actividad física por lo menos 30 minutos al día, la mayoría de los días de la [place]  Algunos de los ejercicios incluyen caminar, montar en bicicleta, bailar y nadar  Usted también puede realizar más actividad física usando las escaleras en vez de los ascensores o estacionarse más lejos cuando Jeffery Hones a las tiendas  Incluya ejercicios para fortalecer los músculos 2 días a la semana  El ejercicio regular proporciona muchos beneficios para la rodney  [employer] a controlar zamora peso y [employer] riesgo de diabetes tipo 2, presión arterial suma, enfermedad del corazón y accidente cerebrovascular  El ejercicio [employer] puede ayudar a mejorar zamora estado de ánimo  Pregunte a zamora médico acerca del mejor plan de ejercicio para usted  Pautas generales de rodney y seguridad:  · No fume  La nicotina y otras sustancias químicas que contienen los cigarrillos y cigarros pueden dañar los pulmones  Pida información a zamora médico si usted actualmente fuma y necesita ayuda para dejar de fumar  Los cigarrillos electrónicos o el tabaco sin humo igualmente contienen nicotina  Consulte con zamora médico antes de [employer]  · Limite el consumo de alcohol  Un trago equivale a 12 onzas de cerveza, 5 onzas de vino o 1 onza y ½ de licor  · Baje de peso, si es necesario  El sobrepeso aumenta el riesgo de ciertas condiciones de [place]   Estos incluyen enfermedad del corazón, presión arterial suma, diabetes tipo 2 y ciertos tipos de cáncer  · Proteja zamora piel  No tome el sol ni use matthieu de bronceado  Use un protector solar con un FPS mayor a 13  Aplíquese el bloqueador por lo menos 15 minutos antes de que vaya a estar al [employer]  Vuelva a aplicarse la crema solar cada 2 horas  Use ropa protectora, sombrero y lentes para el sol cuando se encuentre afuera  · Conduzca con seguridad  Use siempre zamora cinturón de seguridad  Asegúrese que todos en el edith usan el cinturón de seguridad  Un cinturón de seguridad puede salvar zamora cherie en [place] de un accidente automovilístico  No use el celular cuando esté manejando  [place] puede hacer que se distraiga y causar un accidente  Es mejor que pare y se orille si necesita hacer bradley Verle Bunde un texto  · Practique el sexo seguro  Use condones de látex si es sexualmente [place] y tiene más de [place]  Zamora médico puede recomendar exámenes de detección de infecciones de transmisión sexual (ITS)  · Use un christa, un chaleco salvavidas y unos implementos de protección  Siempre use un christa al [employer] en bicicleta o motocicleta, esquiar o jugar deportes que podrían causar bradley lesión en la jose  Use implementos de protección cuando practique deportes  Use un chaleco salvavidas cuando esté en un bote o practicando actividades acuáticas  © Copyright [address] Information is for End User's use only and may not be sold, redistributed or otherwise used for commercial purposes  All illustrations and images included in CareNotes® are the copyrighted property of A D A RF [employer] , [place]  or [address] [place] [address] es sólo para uso en educación  Zamora intención no es darle un consejo médico sobre enfermedades o tratamientos  Colsulte con zamora Link [employer] farmacéutico antes de seguir cualquier régimen médico para saber si es seguro y efectivo para usted

## 2021-04-22 NOTE — ASSESSMENT & PLAN NOTE
BMI Counseling: Body mass index is 40 92 kg/m²  The BMI is above normal  Nutrition recommendations include 3-5 servings of fruits/vegetables daily and increasing intake of lean protein

## 2021-04-30 ENCOUNTER — IMMUNIZATIONS (OUTPATIENT)
Dept: FAMILY MEDICINE CLINIC | Facility: HOSPITAL | Age: 61
End: 2021-04-30

## 2021-04-30 DIAGNOSIS — Z23 ENCOUNTER FOR IMMUNIZATION: Primary | ICD-10-CM

## 2021-04-30 PROCEDURE — 91301 SARS-COV-2 / COVID-19 MRNA VACCINE (MODERNA) 100 MCG: CPT

## 2021-04-30 PROCEDURE — 0012A SARS-COV-2 / COVID-19 MRNA VACCINE (MODERNA) 100 MCG: CPT

## 2021-05-22 ENCOUNTER — LAB (OUTPATIENT)
Dept: LAB | Facility: HOSPITAL | Age: 61
End: 2021-05-22
Payer: COMMERCIAL

## 2021-05-22 DIAGNOSIS — R73.03 PREDIABETES: ICD-10-CM

## 2021-05-22 DIAGNOSIS — E55.9 VITAMIN D DEFICIENCY: ICD-10-CM

## 2021-05-22 DIAGNOSIS — E78.5 HYPERLIPIDEMIA, UNSPECIFIED HYPERLIPIDEMIA TYPE: ICD-10-CM

## 2021-05-22 LAB
ALBUMIN SERPL BCP-MCNC: 4.2 G/DL (ref 3–5.2)
ALP SERPL-CCNC: 54 U/L (ref 43–122)
ALT SERPL W P-5'-P-CCNC: 18 U/L
ANION GAP SERPL CALCULATED.3IONS-SCNC: 5 MMOL/L (ref 5–14)
AST SERPL W P-5'-P-CCNC: 29 U/L (ref 14–36)
BASOPHILS # BLD AUTO: 0 THOUSANDS/ΜL (ref 0–0.1)
BASOPHILS NFR BLD AUTO: 1 % (ref 0–1)
BILIRUB SERPL-MCNC: 0.64 MG/DL
BUN SERPL-MCNC: 14 MG/DL (ref 5–25)
CALCIUM SERPL-MCNC: 10 MG/DL (ref 8.4–10.2)
CHLORIDE SERPL-SCNC: 104 MMOL/L (ref 97–108)
CHOLEST SERPL-MCNC: 292 MG/DL
CO2 SERPL-SCNC: 31 MMOL/L (ref 22–30)
CREAT SERPL-MCNC: 0.66 MG/DL (ref 0.6–1.2)
EOSINOPHIL # BLD AUTO: 0.1 THOUSAND/ΜL (ref 0–0.4)
EOSINOPHIL NFR BLD AUTO: 2 % (ref 0–6)
ERYTHROCYTE [DISTWIDTH] IN BLOOD BY AUTOMATED COUNT: 14.1 %
EST. AVERAGE GLUCOSE BLD GHB EST-MCNC: 120 MG/DL
GFR SERPL CREATININE-BSD FRML MDRD: 97 ML/MIN/1.73SQ M
GLUCOSE P FAST SERPL-MCNC: 90 MG/DL (ref 70–99)
HBA1C MFR BLD: 5.8 %
HCT VFR BLD AUTO: 43.2 % (ref 36–46)
HDLC SERPL-MCNC: 72 MG/DL
HGB BLD-MCNC: 14.4 G/DL (ref 12–16)
LDLC SERPL CALC-MCNC: 194 MG/DL
LYMPHOCYTES # BLD AUTO: 2 THOUSANDS/ΜL (ref 0.5–4)
LYMPHOCYTES NFR BLD AUTO: 44 % (ref 25–45)
MCH RBC QN AUTO: 29.3 PG (ref 26–34)
MCHC RBC AUTO-ENTMCNC: 33.4 G/DL (ref 31–36)
MCV RBC AUTO: 88 FL (ref 80–100)
MONOCYTES # BLD AUTO: 0.3 THOUSAND/ΜL (ref 0.2–0.9)
MONOCYTES NFR BLD AUTO: 8 % (ref 1–10)
NEUTROPHILS # BLD AUTO: 2 THOUSANDS/ΜL (ref 1.8–7.8)
NEUTS SEG NFR BLD AUTO: 45 % (ref 45–65)
NONHDLC SERPL-MCNC: 220 MG/DL
PLATELET # BLD AUTO: 258 THOUSANDS/UL (ref 150–450)
PMV BLD AUTO: 9.9 FL (ref 8.9–12.7)
POTASSIUM SERPL-SCNC: 4.3 MMOL/L (ref 3.6–5)
PROT SERPL-MCNC: 7.9 G/DL (ref 5.9–8.4)
RBC # BLD AUTO: 4.93 MILLION/UL (ref 4–5.2)
SODIUM SERPL-SCNC: 140 MMOL/L (ref 137–147)
TRIGL SERPL-MCNC: 130 MG/DL
WBC # BLD AUTO: 4.5 THOUSAND/UL (ref 4.5–11)

## 2021-05-22 PROCEDURE — 80053 COMPREHEN METABOLIC PANEL: CPT

## 2021-05-22 PROCEDURE — 85025 COMPLETE CBC W/AUTO DIFF WBC: CPT

## 2021-05-22 PROCEDURE — 36415 COLL VENOUS BLD VENIPUNCTURE: CPT

## 2021-05-22 PROCEDURE — 80061 LIPID PANEL: CPT

## 2021-05-22 PROCEDURE — 82306 VITAMIN D 25 HYDROXY: CPT

## 2021-05-22 PROCEDURE — 83036 HEMOGLOBIN GLYCOSYLATED A1C: CPT

## 2021-05-23 LAB — 25(OH)D3 SERPL-MCNC: 19.6 NG/ML (ref 30–100)

## 2021-05-24 DIAGNOSIS — E78.01 FAMILIAL HYPERCHOLESTEROLEMIA: ICD-10-CM

## 2021-05-24 DIAGNOSIS — E55.9 VITAMIN D DEFICIENCY: Primary | ICD-10-CM

## 2021-05-24 RX ORDER — ERGOCALCIFEROL 1.25 MG/1
50000 CAPSULE ORAL WEEKLY
Qty: 4 CAPSULE | Refills: 2 | Status: SHIPPED | OUTPATIENT
Start: 2021-05-24 | End: 2022-01-19

## 2021-05-24 RX ORDER — ROSUVASTATIN CALCIUM 20 MG/1
20 TABLET, COATED ORAL DAILY
Qty: 30 TABLET | Refills: 5 | Status: SHIPPED | OUTPATIENT
Start: 2021-05-24 | End: 2022-01-19 | Stop reason: SDUPTHER

## 2021-05-24 NOTE — RESULT ENCOUNTER NOTE
Vitamin D was low  Prescription vitamin D-ergocalciferol- will be called into pharmacy  Take it 1 time a week for 3 months and then after we will transition to daily vitamin d  Call in 3 months to start daily vitamin d  Vitamin D deficiency can lead to osteoporosis over time  It can also can cause muscle aches and fatigue  Her blood sugar was in the prediabetic range  Recommend trying to increase exercise  Try to decrease sugary foods in her diet and try to avoid white carbohydrates  Her cholesterol was elevated  It went up 40 points in the last year  I would would commence switching from the Crestor over to rosuvastatin because it is stronger

## 2021-09-21 ENCOUNTER — OFFICE VISIT (OUTPATIENT)
Dept: FAMILY MEDICINE CLINIC | Facility: CLINIC | Age: 61
End: 2021-09-21

## 2021-09-21 VITALS
OXYGEN SATURATION: 95 % | HEART RATE: 80 BPM | HEIGHT: 63 IN | BODY MASS INDEX: 40.4 KG/M2 | DIASTOLIC BLOOD PRESSURE: 76 MMHG | RESPIRATION RATE: 18 BRPM | TEMPERATURE: 97.5 F | WEIGHT: 228 LBS | SYSTOLIC BLOOD PRESSURE: 128 MMHG

## 2021-09-21 DIAGNOSIS — Z23 FLU VACCINE NEED: Primary | ICD-10-CM

## 2021-09-21 DIAGNOSIS — Z12.4 CERVICAL CANCER SCREENING: ICD-10-CM

## 2021-09-21 DIAGNOSIS — G43.109 MIGRAINE WITH AURA AND WITHOUT STATUS MIGRAINOSUS, NOT INTRACTABLE: ICD-10-CM

## 2021-09-21 DIAGNOSIS — E66.01 MORBID OBESITY (HCC): ICD-10-CM

## 2021-09-21 PROCEDURE — 90682 RIV4 VACC RECOMBINANT DNA IM: CPT | Performed by: PHYSICIAN ASSISTANT

## 2021-09-21 PROCEDURE — 99214 OFFICE O/P EST MOD 30 MIN: CPT | Performed by: PHYSICIAN ASSISTANT

## 2021-09-21 PROCEDURE — 90471 IMMUNIZATION ADMIN: CPT | Performed by: PHYSICIAN ASSISTANT

## 2021-09-21 RX ORDER — TOPIRAMATE 25 MG/1
25 TABLET ORAL 2 TIMES DAILY
Qty: 60 TABLET | Refills: 2 | Status: SHIPPED | OUTPATIENT
Start: 2021-09-21 | End: 2022-01-19

## 2021-09-21 RX ORDER — SUMATRIPTAN 25 MG/1
25 TABLET, FILM COATED ORAL ONCE AS NEEDED
Qty: 9 TABLET | Refills: 3 | Status: SHIPPED | OUTPATIENT
Start: 2021-09-21 | End: 2022-01-19

## 2021-09-21 NOTE — PATIENT INSTRUCTIONS
Control del peso   LO QUE NECESITA SABER:   ¿Por qué es importante controlar mi peso corporal? Tener sobrepeso aumenta zamora riesgo de presentar problemas de rodney jerson enfermedades del corazón, hipertensión, diabetes tipo 2 y ciertos tipos de cáncer  También puede aumentar zamora riesgo de presentar osteoartritis, apnea del sueño y otros problemas respiratorios  Trate de bajar de peso de forma gradual y [place]  Incluso bradley mínima pérdida de peso puede disminuir zamora riesgo de problemas de [place]  ¿Cómo puedo bajar de peso de Robertha Valle forma cornoa? Bradley forma corona y saludable para perder peso es consumir menos calorías y realizar bradley actividad física en forma regular  · Usted puede perder hasta 1 ayden por semana al reducir el consumo de 500 calorías cada día  Usted puede reducir el consumo de calorías al comer porciones más pequeñas o eliminar los alimentos con alto contenido de calorías  [place] las etiquetas para determinar la cantidad de calorías que contienen los alimentos que consume  · También puede quemar calorías al realizar ejercicio jerson caminar, nadar o montar en bicicleta  Es más probable que usted [employer] peso si hace de estos cambios parte de zamora estilo de cherie  Realice bradley actividad física por lo menos 30 minutos al día, la mayoría de los días de la [place]  Usted también puede realizar más actividad física usando las escaleras en vez de los ascensores o estacionarse más lejos cuando Radha Moll a las tiendas  Pregunte a zamora médico acerca del mejor plan de ejercicio para usted  ¿Cuál es un plan de alimentación qué me puede ayudar a controlar mi peso? Un plan de alimentación saludable incluye bradley variedad de alimentos, contiene más pocas calorías y lo [place] a estar saludable  Un plan de alimentación saludable incluye lo siguiente:     · Consuma alimentos de grano integral con más frecuencia  Un plan de alimentación saludable debe contener alimentos con fibra   La fibra es la parte de las frutas, verduras y granos que zamora cuerpo no puede digerir  Los alimentos de granos integrales son saludables y suministran fibra adicional a zamora Iris Bras  Algunos ejemplos de alimentos de granos integrales son los panes integrales, pastas integrales, la gail, el arroz integral y uriah de bulgur  · Consuma bradley variedad de verduras todos los días  [address], coliflor, col liana y [place]  Coma verduras anaranjadas jerson las zanahorias, darcie dulces y calabaza de invierno  · Consuma bradley variedad de frutas todos los bryn  Escoja frutas frescas o enlatadas en zamora propio jugo o con jugo bajo en [place] en vez de jugo  El [place] de frutas tiene [place] o edy nada de [place]  · Consuma productos lácteos con bajo contenido de Beverlie Agreste  [place] [address] de 1%  Consuma yogur descremado y requesón (cottage) semidescremado  Trate de consumir quesos descremados jerson el queso mozzarela y otros quesos semidescremado  · Seleccione ti y otros alimentos con proteínas bajos en grasa  Escoja frijoles u [address]  Seleccione pescado, carne de aves sin piel (jerson el aquiles o [place]), nicholas de carne [place] (de res o de cerdo)  Antes de cocinar las ti o las aves anisha cualquier parte de grasa visible  · Use menos grasas y aceites  Trate de hornear los alimentos en lugar de freírlos  Agregue a las [address], jerson [place], stevo Brown, condimentos para [place] y [facility]  Consuma menos alimentos de alto tenor graso  Coma menos alimentos altos en grasa jerson las darcie fritas, donas, helados y pasteles  · Consuma menos dulces  Limite los alimentos y las bebidas con un gran contenido de azúcar  Estos incluyen los caramelos, galletas, gaseosas normales y bebidas endulzadas  ¿Cuáles son las otras formas en que puedo disminuir las calorías? · Reduzca el tamaño de las porciones  ? Use platos pequeños para servirse porciones pequeñas  ?  No coma segundas porciones  ? Cuando coma en un restaurante, pida bradley Scarlet Grumbling y guarde en jose la mitad de la comida antes de empezar a comer  ? Comparta con alguien un plato de entrada  · Reemplace los bocadillos o meriendas altos en calorías por los saludables de menos calorías  ? Escoja frutas frescas, verduras, galletas de arroz bajo en grasa o palomitas de maíz en lugar de comer darcie fritas de paquete, nueces o dulces de chocolate  ? [place] agua o bebidas dietéticas en lugar de las endulzadas  · No vaya al garcia cuando tenga hambre  Usted podría ser más propenso a elegir alimentos no saludables  Lleve bradley lista de alimentos saludables y vaya de compras después de devendra comido  · Coma etienne comidas regularmente  No se salte ninguna comida  No omita ninguna comida porque esto puede conducir a comer más a bradley hora más tarde del día  [place] podría traerle problemas para perder peso  Si no tiene tiempo para hacer comidas regulares, consuma un refrigerio saludable  Hable con un dietista para que lo ayude a crear un plan de comidas y un horario que margie adecuados para usted  ¿Qué más debería tener en cuenta mientras trato de bajar de peso? · Esté consciente de las situaciones que podrían ocasionarle ganas de comer en exceso, jerson el comer mientras ceasar la televisión  Busque formas para evitar estas situaciones  Por ejemplo, leer un libro, caminar o hacer trabajos manuales  · Reúnase con un mario de apoyo o con personas que también están tratando de bajar de [place]  [place] le puede ayudar a mantenerse motivado y continuar progresando en zamora objetivo de perder peso  ACUERDOS SOBRE ZAMORA CUIDADO:   Usted tiene el derecho de ayudar a planear zamora cuidado  Aprenda todo lo que pueda sobre zamora condición y jerson darle tratamiento  Discuta etienne opciones de tratamiento con etienne médicos para decidir el cuidado que usted desea recibir  Usted siempre tiene el derecho de rechazar el tratamiento  Esta información es sólo para uso en educación  Zamora intención no es darle un consejo médico sobre enfermedades o tratamientos  Colsulte con zamora Benuel [place] farmacéutico antes de seguir cualquier régimen médico para saber si es seguro y efectivo para usted  © Copyright [facility] 2021 Information is for End User's use only and may not be sold, redistributed or otherwise used for commercial purposes   All illustrations and images included in CareNotes® are the copyrighted property of A ELISABETH A EMILIANO , Inc  or [address] [place] [address]

## 2021-09-21 NOTE — PROGRESS NOTES
Assessment/Plan:    Migraine with aura  To start topamax 5mg BID  Continue prn imitrex  Morbid obesity ([facility] )  BMI Counseling: Body mass index is 40 39 kg/m²  The BMI is above normal  Nutrition recommendations include 3-5 servings of fruits/vegetables daily, moderation in carbohydrate intake and increasing intake of lean protein  1  Migraines   -Rx Sumatriptan (IMITREX) 25 mg tablet PO prn migraine   -Rx Topiramate (TOPAMAX) 25 mg tablet PO BID   -Pt educated on medication side effects including weight loss with Topamax, and tingling of extremities and increased potency of alcohol for Sumatriptan  Pt assessed for understanding and agreed with plan  -Advised to f/u in 2 months to reassess status of migraines  Advised to f/u sooner if migraines worsen in severity, duration or if no relief from medication      2  Morbid obesity   -Pt educated on eating a balanced diet including eating sufficient fruit and vegetables, limiting sugar intake as well as exercising regularly   -Referral to Weight Management for further treatment     3  Cervical cancer screening   -Referral to GYN     4  Flu vaccine   Declined vaccine due to a desire to get it at her own pharmacy      Diagnoses and all orders for this visit:    Flu vaccine need  -     Cancel: FLULAVAL: influenza vaccine, quadrivalent, 0 5 mL  -     influenza vaccine, quadrivalent, recombinant, PF, 0 5 mL, for patients 18 yr+ (FLUBLOK)    Migraine with aura and without status migrainosus, not intractable  -     SUMAtriptan (IMITREX) 25 mg tablet; Take 1 tablet (25 mg total) by mouth once as needed for migraine for up to 1 dose  -     topiramate (TOPAMAX) 25 mg tablet; Take 1 tablet (25 mg total) by mouth 2 (two) times a day    Morbid obesity ([facility] )  -     Ambulatory referral to Weight Management; Future    Cervical cancer screening  -     Ambulatory referral to Gynecology; Future          Subjective:      Patient ID: Gwcynthia Butler is a 62 y o  female      EMILIANO YOUSSEF  is a 61 y/o F presenting to the office c/o migraines  These migraines have been occurring for years  The migraines come on randomly  Reports no new changes in these migraines  Previously used Sumatriptan for relief but stopped after her insurance stopped covering it  Tried otc Excedrin to minimal relief  The migraines come on twice a week and typically last a few hours  They begin around the lateral border nose bilaterally and radiate to the fronto-temporal region  Admits to nasal congestion with HAs  Denies vision changes, radiation into neck, change in frequency or duration, numbness, tingling, dizziness, facial pain or swelling  Also reports concern with weight  Reports steady weight gain over the years  States weight has been going up and down over the last few months  Diet is some what balanced  Reports only exercise is mowing the grass  Expressed desire to begin a weight program or talk to a nutrtionist      Migraine   This is a recurrent problem  The current episode started more than 1 year ago  The problem occurs intermittently  The problem has been unchanged  The pain is located in the bilateral and frontal region  The pain does not radiate  The pain quality is similar to prior headaches  The quality of the pain is described as dull  The pain is moderate  Associated symptoms include rhinorrhea  Pertinent negatives include no blurred vision, dizziness, eye pain, eye redness, eye watering, fever, numbness, photophobia, scalp tenderness, sinus pressure, sore throat, swollen glands or visual change  She has tried triptans for the symptoms  The treatment provided significant relief  Her past medical history is significant for migraine headaches  The following portions of the patient's history were reviewed and updated as appropriate: past family history, past medical history, past social history, past surgical history and problem list     Review of Systems   Constitutional: Negative for chills and fever  HENT: Positive for congestion and rhinorrhea  Negative for facial swelling, sinus pressure, sinus pain and sore throat  Eyes: Negative for blurred vision, photophobia, pain, discharge and redness  Respiratory: Negative for apnea, chest tightness and shortness of breath  Cardiovascular: Negative for chest pain and palpitations  Neurological: Positive for headaches  Negative for dizziness, light-headedness and numbness  Objective:      /76 (BP Location: Left arm, Patient Position: Sitting, Cuff Size: Large)   Pulse 80   Temp 97 5 °F (36 4 °C) (Temporal)   Resp 18   Ht 5' 3" (1 6 m)   Wt 103 kg (228 lb)   SpO2 95%   BMI 40 39 kg/m²          Physical Exam  Constitutional:       General: She is not in acute distress  Appearance: Normal appearance  She is normal weight  She is not ill-appearing or toxic-appearing  HENT:      Nose: Congestion present  No rhinorrhea  Mouth/Throat:      Mouth: Mucous membranes are moist       Pharynx: No oropharyngeal exudate or posterior oropharyngeal erythema  Eyes:      General:         Right eye: No discharge  Left eye: No discharge  Pupils: Pupils are equal, round, and reactive to light  Cardiovascular:      Rate and Rhythm: Normal rate and regular rhythm  Pulses: Normal pulses  Heart sounds: Normal heart sounds  No murmur heard  No friction rub  No gallop  Pulmonary:      Effort: Pulmonary effort is normal  No respiratory distress  Breath sounds: Normal breath sounds  No wheezing  Neurological:      General: No focal deficit present  Mental Status: She is alert and oriented to person, place, and time

## 2021-09-22 NOTE — ASSESSMENT & PLAN NOTE
BMI Counseling: Body mass index is 40 39 kg/m²  The BMI is above normal  Nutrition recommendations include 3-5 servings of fruits/vegetables daily, moderation in carbohydrate intake and increasing intake of lean protein

## 2021-12-14 ENCOUNTER — OFFICE VISIT (OUTPATIENT)
Dept: OBGYN CLINIC | Facility: CLINIC | Age: 61
End: 2021-12-14

## 2021-12-14 VITALS
DIASTOLIC BLOOD PRESSURE: 100 MMHG | HEART RATE: 88 BPM | SYSTOLIC BLOOD PRESSURE: 140 MMHG | WEIGHT: 225 LBS | HEIGHT: 63 IN | BODY MASS INDEX: 39.87 KG/M2

## 2021-12-14 DIAGNOSIS — Z12.4 CERVICAL CANCER SCREENING: ICD-10-CM

## 2021-12-14 DIAGNOSIS — Z01.419 ENCOUNTER FOR ROUTINE GYNECOLOGICAL EXAMINATION WITH PAPANICOLAOU SMEAR OF CERVIX: Primary | ICD-10-CM

## 2021-12-14 PROCEDURE — G0145 SCR C/V CYTO,THINLAYER,RESCR: HCPCS

## 2021-12-14 PROCEDURE — 3008F BODY MASS INDEX DOCD: CPT | Performed by: OBSTETRICS & GYNECOLOGY

## 2021-12-14 PROCEDURE — 99214 OFFICE O/P EST MOD 30 MIN: CPT | Performed by: OBSTETRICS & GYNECOLOGY

## 2021-12-14 PROCEDURE — 0503F POSTPARTUM CARE VISIT: CPT | Performed by: OBSTETRICS & GYNECOLOGY

## 2021-12-22 LAB
LAB AP GYN PRIMARY INTERPRETATION: NORMAL
Lab: NORMAL

## 2022-01-19 ENCOUNTER — OFFICE VISIT (OUTPATIENT)
Dept: FAMILY MEDICINE CLINIC | Facility: CLINIC | Age: 62
End: 2022-01-19

## 2022-01-19 VITALS
BODY MASS INDEX: 39.87 KG/M2 | HEIGHT: 63 IN | WEIGHT: 225 LBS | TEMPERATURE: 98 F | SYSTOLIC BLOOD PRESSURE: 138 MMHG | RESPIRATION RATE: 16 BRPM | DIASTOLIC BLOOD PRESSURE: 90 MMHG | OXYGEN SATURATION: 97 % | HEART RATE: 75 BPM

## 2022-01-19 DIAGNOSIS — G89.29 CHRONIC PAIN OF LEFT KNEE: ICD-10-CM

## 2022-01-19 DIAGNOSIS — I10 BENIGN HYPERTENSION: Primary | ICD-10-CM

## 2022-01-19 DIAGNOSIS — E78.01 FAMILIAL HYPERCHOLESTEROLEMIA: ICD-10-CM

## 2022-01-19 DIAGNOSIS — E55.9 VITAMIN D DEFICIENCY: ICD-10-CM

## 2022-01-19 DIAGNOSIS — M25.562 CHRONIC PAIN OF LEFT KNEE: ICD-10-CM

## 2022-01-19 PROCEDURE — 99214 OFFICE O/P EST MOD 30 MIN: CPT | Performed by: PHYSICIAN ASSISTANT

## 2022-01-19 PROCEDURE — 3008F BODY MASS INDEX DOCD: CPT | Performed by: PHYSICIAN ASSISTANT

## 2022-01-19 RX ORDER — ROSUVASTATIN CALCIUM 20 MG/1
20 TABLET, COATED ORAL DAILY
Qty: 30 TABLET | Refills: 5 | Status: SHIPPED | OUTPATIENT
Start: 2022-01-19 | End: 2022-07-20 | Stop reason: SDUPTHER

## 2022-01-19 RX ORDER — NIFEDIPINE 30 MG/1
30 TABLET, FILM COATED, EXTENDED RELEASE ORAL DAILY
Qty: 30 TABLET | Refills: 2 | Status: SHIPPED | OUTPATIENT
Start: 2022-01-19 | End: 2022-04-21

## 2022-01-19 NOTE — PROGRESS NOTES
Assessment/Plan:    Hypertension  To restart nifedipine and recheck in 1 month         Problem List Items Addressed This Visit        Other    Hyperlipidemia    Relevant Medications    rosuvastatin (CRESTOR) 20 MG tablet    Vitamin D deficiency    Relevant Orders    Vitamin D 25 hydroxy      Other Visit Diagnoses     Benign hypertension    -  Primary    Relevant Medications    NIFEdipine ER (ADALAT CC) 30 MG 24 hr tablet    Other Relevant Orders    Comprehensive metabolic panel    Hemoglobin A1c (w/out EAG)    Lipid panel    Chronic pain of left knee        Relevant Medications    Diclofenac Sodium (VOLTAREN) 1 %            Subjective:      Patient ID: Sascha Mosquera is a 62 y o  female  HPI here for follow up of headaches  Headaches have resolved  She did see gyn and bp was elevated  She has hx of tia  Not on bp meds  On both lipitor and crestor right now  She is having having left knee pain x 2 months  No injury  No swelling  She tried tylenol for 3 days  The following portions of the patient's history were reviewed and updated as appropriate:   She  has a past medical history of Peptic ulcer (12/1/2017) and Temporary cerebral vascular dysfunction (11/4/2014)    She   Patient Active Problem List    Diagnosis Date Noted    Encounter for routine gynecological examination with Papanicolaou smear of cervix 12/14/2021    Prediabetes 05/18/2020    Encounter for physical examination related to employment 05/18/2020    ANUPAM (obstructive sleep apnea)     At risk for obstructive sleep apnea 01/08/2020    Inadequate sleep hygiene 01/08/2020    Chronic nonintractable headache 10/17/2019    Colon polyp 01/12/2018    Allergic rhinitis 08/12/2016    Vitamin D deficiency 08/12/2016    Sleep disorder 08/12/2016    Anxiety 11/04/2014    Depression 11/04/2014    Gastroesophageal reflux disease 11/04/2014    Hypertension 11/04/2014    Hyperlipidemia 11/04/2014    Transient ischemic attack 2014    Morbid obesity ([facility] [address] ) 2014    Migraine with aura 2012     She  has a past surgical history that includes  section (1479,5596,6956)  Her family history includes Hypertension in her mother; No Known Problems in her maternal aunt, maternal aunt, maternal aunt, maternal aunt, maternal aunt, maternal aunt, maternal aunt, maternal aunt, maternal aunt, maternal aunt, maternal aunt, maternal aunt, paternal aunt, paternal aunt, paternal aunt, paternal aunt, sister, sister, sister, sister, sister, and sister  She  reports that she has never smoked  She has never used smokeless tobacco  She reports current alcohol use  She reports that she does not use drugs  Current Outpatient Medications   Medication Sig Dispense Refill    Cholecalciferol (VITAMIN D3) 20 MCG (800 UNIT) TABS Take 1 tablet by mouth daily (Patient not taking: Reported on 2021 ) 90 tablet 0    Diclofenac Sodium (VOLTAREN) 1 % Apply 2 g topically 4 (four) times a day 100 g 3    famotidine (PEPCID) 20 mg tablet Take 1 tablet (20 mg total) by mouth daily (Patient not taking: Reported on 2021 ) 90 tablet 0    NIFEdipine ER (ADALAT CC) 30 MG 24 hr tablet Take 1 tablet (30 mg total) by mouth daily 30 tablet 2    pantoprazole (PROTONIX) 40 mg tablet TAKE 1 TABLET BY MOUTH DAILY (Patient not taking: Reported on 2021) 90 tablet 0    rosuvastatin (CRESTOR) 20 MG tablet Take 1 tablet (20 mg total) by mouth daily 30 tablet 5     No current facility-administered medications for this visit       Current Outpatient Medications on File Prior to Visit   Medication Sig    Cholecalciferol (VITAMIN D3) 20 MCG (800 UNIT) TABS Take 1 tablet by mouth daily (Patient not taking: Reported on 2021 )    famotidine (PEPCID) 20 mg tablet Take 1 tablet (20 mg total) by mouth daily (Patient not taking: Reported on 2021 )    pantoprazole (PROTONIX) 40 mg tablet TAKE 1 TABLET BY MOUTH DAILY (Patient not taking: Reported on 12/14/2021)    [DISCONTINUED] aspirin-acetaminophen-caffeine (EXCEDRIN MIGRAINE) 250-250-65 MG per tablet Take 1 tablet by mouth as needed (Patient not taking: Reported on 12/14/2021 )    [DISCONTINUED] ergocalciferol (VITAMIN D2) 50,000 units Take 1 capsule (50,000 Units total) by mouth once a week (Patient not taking: Reported on 12/14/2021 )    [DISCONTINUED] rosuvastatin (CRESTOR) 20 MG tablet Take 1 tablet (20 mg total) by mouth daily (Patient not taking: Reported on 12/14/2021 )    [DISCONTINUED] SUMAtriptan (IMITREX) 25 mg tablet Take 1 tablet (25 mg total) by mouth once as needed for migraine for up to 1 dose (Patient not taking: Reported on 12/14/2021 )    [DISCONTINUED] topiramate (TOPAMAX) 25 mg tablet Take 1 tablet (25 mg total) by mouth 2 (two) times a day (Patient not taking: Reported on 12/14/2021 )     No current facility-administered medications on file prior to visit  She is allergic to aspirin and no known allergies       Review of Systems   Constitutional: Negative for activity change, appetite change, chills, fatigue and unexpected weight change  HENT: Negative for dental problem, ear pain, hearing loss and sore throat  Eyes: Negative for visual disturbance  Respiratory: Negative for cough and wheezing  Cardiovascular: Negative for chest pain  Gastrointestinal: Negative for abdominal pain, constipation, diarrhea and vomiting  Genitourinary: Negative for difficulty urinating and dysuria  Musculoskeletal: Positive for arthralgias  Negative for back pain and myalgias  Skin: Negative for rash  Neurological: Negative for dizziness and headaches  Psychiatric/Behavioral: Negative for behavioral problems           Objective:      /90 (BP Location: Left arm, Patient Position: Sitting, Cuff Size: Large)   Pulse 75   Temp 98 °F (36 7 °C) (Temporal)   Resp 16   Ht 5' 3" (1 6 m)   Wt 102 kg (225 lb)   SpO2 97%   BMI 39 86 kg/m²          Physical Exam  Vitals and nursing note reviewed  Constitutional:       Appearance: She is well-developed  HENT:      Head: Normocephalic and atraumatic  Right Ear: External ear normal       Left Ear: External ear normal       Nose: Nose normal    Eyes:      Conjunctiva/sclera: Conjunctivae normal    Neck:      Thyroid: No thyromegaly  Cardiovascular:      Rate and Rhythm: Normal rate and regular rhythm  Heart sounds: Normal heart sounds  No murmur heard  Pulmonary:      Effort: Pulmonary effort is normal  No respiratory distress  Breath sounds: Normal breath sounds  Musculoskeletal:         General: Tenderness (left medial joint line) present  Normal range of motion  Cervical back: Normal range of motion and neck supple  Lymphadenopathy:      Cervical: No cervical adenopathy  Skin:     General: Skin is warm  Neurological:      Mental Status: She is alert and oriented to person, place, and time     Psychiatric:         Mood and Affect: Mood normal          Behavior: Behavior normal

## 2022-01-19 NOTE — PATIENT INSTRUCTIONS
Hipertensión crónica   LO QUE NECESITA SABER:   La hipertensión es la presión arterial morena  La presión arterial es la fuerza que ejerce la jerson al [employer] contra las nobles de las arterias  La hipertensión causa que monsalve presión arterial se eleve tanto que monsalve corazón se ve forzado a trabajar mucho más dianelys que lo normal  Neligh puede dañar monsalve corazón  Incluso si tiene hipertensión [address], los cambios de [facility] de cherie, medicamentos o ambos pueden ayudar a bajar la presión arterial a niveles normales  INSTRUCCIONES SOBRE EL MORENA HOSPITALARIA:   Llame al [employer] de emergencias (911 en [address]) o pídale a alguien que llame si:  · Usted tiene dolor en el pecho  · Tiene alguno de los siguientes signos de un ataque cardíaco:      ? Estrujamiento, presión o tensión en monsalve pecho    ? Usted también podría presentar alguno de los siguientes:     § Malestar o dolor en monsalve espalda, tomeka, mandíbula, abdomen, o brazo    § Falta de [employer] o vómitos    § Desvanecimiento o sudor frío repentino    · Usted se siente confundido o tiene dificultad para hablar  · Repentinamente se siente aturdido o con dificultad para respirar  Regrese a la lynnette de emergencias si:  · Usted tiene un dianelys dolor de jose o pérdida de la visión  · Usted tiene debilidad en un brazo o en bradley pierna  Llame a monsalve médico o cardiólogo si:  · Usted se siente mareado, confundido, somnoliento o jerson si se fuera a desmayar  · Usted ha estado tomando medicamento para la presión arterial johnathon todavía está en un nivel más elevado del que monsalve médico le indicó que debería estar  · Usted tiene preguntas o inquietudes acerca de monsalve condición o cuidado  Medicamentos: Es posible que usted necesite alguno de los siguientes:  · Los antihipertensivos podrían usarse para ayudar a disminuir la presión arterial  Varios tipos de medicamentos están disponibles  Monsalve médico podría modificar los [place] poly   Neligh puede ser necesario si zamora presión arterial suele ser suma cuando usted la controla en zamora casa o tiene otros problemas con el control de la presión arterial     · Los diuréticos ayudan a eliminar el exceso de líquido que se acumula en el organismo  [place] contribuirá a bajar zamora presión arterial  Es posible que orine más seguido mientras poly kofi medicamento  · Los medicamentos para el colesterol ayudan a bajar los niveles de [place]  Un nivel bajo de colesterol ayuda a prevenir enfermedades cardíacas y facilita el control de la presión arterial     · [place] etienne medicamentos jerson se le haya indicado  Consulte con zamora médico si usted corbin que zamora medicamento no le está ayudando o si presenta efectos secundarios  Infórmele si es alérgico a cualquier medicamento  Mantenga bradley lista actualizada de los [place], las vitaminas y los productos herbales que poly  Incluya los siguientes datos de los medicamentos: cantidad, frecuencia y motivo de administración  Traiga con usted la lista o los envases de las píldoras a etienne citas de seguimiento  Lleve la lista de los medicamentos con usted en [place] de bradley emergencia  [place] de la hipertensión:      · Clyda Crooked presión arterial normal es 119/79 o inferior   Zamora médico podría comprobar zamora presión arterial solamente cada año si se mantiene en un nivel normal     · [place] presión arterial elevada es de 120/79 a 129/79   A veces esto se llama prehipertensión  Zamora médico puede sugerir cambios de estilo de cherie para ayudar a bajar la presión arterial a un nivel normal  Entonces él podría comprobar zamora presión otra vez en 3 a 6 meses  · La etapa 1 de la hipertensión es de 130/80  a 139/89   Zamora médico podría recomendar cambios de estilo de cherie, medicamentos y controles cada 3 a 6 meses hasta que zamora presión arterial esté controlada  · La etapa 2 de la hipertensión es de 140/90 o mayor    Zamora médico le recomendará cambios en el estilo de cherie y le indicará 2 clases de medicamentos para la hipertensión  También necesitará controlar zamora presión arterial cada mes hasta que esté controlada  Controle la hipertensión crónica:  · Controle zamora presión arterial en casa  Evite fumar, consumir cafeína y hacer ejercicio al menos 30 minutos antes de controlar zamora presión arterial  Siéntese y descanse por 5 minutos antes de tomarse la presión arterial  Extienda zamora brazo y apóyelo en bradley superficie plana  Zamora brazo debe estar a la misma altura que zamora corazón  Siga las instrucciones que vienen con el monitor para la presión arterial  Controle zamora presión arterial 2 veces, con diferencia de 1 minuto, antes de trina zamora medicamento por la mañana  También controle zamora presión arterial antes de la dax  Mantenga un registro de zamora peso y llévelo con usted a las citas de control  Pregúntele a zamora médico cuál debería ser zamora presión arterial          · Controle cualquier otra condición médica que usted tenga  Algunas condiciones médicas jerson la diabetes pueden aumentar zamora riesgo de hipertensión  [address] zamora médico y tómese etienne medicamentos según dichas instrucciones  Hable con zamora médico sobre cualquier nueva afección médica que haya desarrollado recientemente  · Pregunte [employer]  Ciertos medicamentos pueden aumentar zamora presión arterial  Los ejemplos incluyen las píldoras anticonceptivas orales, los descongestivos, los suplementos herbales y los [place], jerson el ibuprofeno  Zamora médico puede indicarle qué medicamentos son seguros para usted  Estos medicamentos incluyen los recetados y de [place]  Cambios de estilo de cherie que usted puede hacer para reducir zamora presión arterial: Zamora médico josias vez quiera que delroy más cambios de estilo de cherie si usted tiene problemas para controlar zamora presión arterial  [place] puede parecer difícil con [employer], especialmente si usted piensa que usted está haciendo buenos cambios johnathon zamora presión sigue siendo suma   Josias vez lo ayude centrarse en un nuevo cambio a la vez  Por ejemplo, intente agregar 1 día más de ejercicio o ejercite katarzyna 10 minutos adicionales en 2 días  Pequeños cambios pueden hacer bradley gran diferencia  Zamora médico también puede derivarlo a los especialistas, jerson un dietista que pueda ayudarlo a hacer pequeños cambios  Los [employer] de zamora jovany pueden ayudarlo a aprender a crear cambios en el estilo de cherie, jerson los siguientes:     · Limite el sodio (la sal) jerson se le haya indicado  Demasiado sodio puede afectar el equilibrio de líquidos  Revise las etiquetas para buscar alimentos bajos en sodio o sin sal agregada  Algunos alimentos bajos en sodio utilizan sales de potasio para añadir sabor  Demasiado potasio también puede causar problemas de [place]  Zamora médico le dirá qué cantidad de sodio y potasio es corona para el consumo en un día  Puede recomendarle que limite el sodio a 2,300 mg al día  · Siga el plan de comidas recomendado por zamora médico  Un dietista o médico puede darle más información sobre planes de bajo contenido de sodio o el plan de alimentación DASH (enfoques dietéticos para detener la hipertensión)  El plan DASH es bajo en sodio, azúcar procesada, grasas dañinas y grasas totales  Es alto en potasio, calcio y [place]  Estos se encuentran en las verduras, las frutas y los alimentos integrales  · Manténgase físicamente activo katarzyna todo el día  La actividad física, jerson el ejercicio, puede ayudar a controlar zamora presión arterial y zamora peso  [place] actividad física por lo menos 30 minutos al día, la mayoría de los días de la [place]  Incluya bradley actividad aeróbica, jerson caminar o montar en bicicleta  Incluya también entrenamiento de fuerza al menos 2 veces por semana  Lydia médicos pueden ayudarle a crear un plan de [place] Val[place]  · [address] [facility] estrés  Es posible que esto contribuya a bajar zamora presión arterial  Aprenda sobre formas de [place], jerson respiración profunda o escuchar música      · Limite el consumo de alcohol según le indicaron  El alcohol puede aumentar la presión arterial  Un trago equivale a 12 onzas de cerveza, 5 onzas de vino o 1 onza y ½ de licor  · No fume  La nicotina y otros químicos en los cigarrillos y cigarros pueden aumentar zamora presión arterial y también provocar daño al pulmón  Pida información a zamora médico si usted actualmente fuma y necesita ayuda para dejar de fumar  Los cigarrillos electrónicos o el tabaco sin humo igualmente contienen nicotina  Consulte con zamora médico antes de [employer]  Acuda a etienne consultas de control con zamora médico o cardiólogo según le indicaron: Usted necesitará regresar para medir zamora presión arterial y realizar otros exámenes de laboratorio  Anote etienne preguntas para que se acuerde de hacerlas katarzyna etienne visitas  © Copyright [employer] 2021 Information is for End User's use only and may not be sold, redistributed or otherwise used for commercial purposes  All illustrations and images included in CareNotes® are the copyrighted property of [employer]  or [address] [place] [address] es sólo para uso en educación  Zamora intención no es darle un consejo médico sobre enfermedades o tratamientos  Colsulte con zamora [place] farmacéutico antes de seguir cualquier régimen médico para saber si es seguro y efectivo para usted

## 2022-01-24 ENCOUNTER — TELEPHONE (OUTPATIENT)
Dept: FAMILY MEDICINE CLINIC | Facility: CLINIC | Age: 62
End: 2022-01-24

## 2022-01-24 NOTE — TELEPHONE ENCOUNTER
Pt called in states that there was suppose to be in order placed to have her knee looked at, some sort of xray or imaging      Please advise

## 2022-01-26 ENCOUNTER — APPOINTMENT (OUTPATIENT)
Dept: LAB | Facility: HOSPITAL | Age: 62
End: 2022-01-26
Payer: COMMERCIAL

## 2022-01-26 DIAGNOSIS — E55.9 VITAMIN D DEFICIENCY: ICD-10-CM

## 2022-01-26 DIAGNOSIS — I10 BENIGN HYPERTENSION: ICD-10-CM

## 2022-01-26 LAB
25(OH)D3 SERPL-MCNC: 34.2 NG/ML (ref 30–100)
ALBUMIN SERPL BCP-MCNC: 4.5 G/DL (ref 3–5.2)
ALP SERPL-CCNC: 52 U/L (ref 43–122)
ALT SERPL W P-5'-P-CCNC: 23 U/L
ANION GAP SERPL CALCULATED.3IONS-SCNC: 9 MMOL/L (ref 5–14)
AST SERPL W P-5'-P-CCNC: 31 U/L (ref 14–36)
BILIRUB SERPL-MCNC: 0.45 MG/DL
BUN SERPL-MCNC: 15 MG/DL (ref 5–25)
CALCIUM SERPL-MCNC: 9.3 MG/DL (ref 8.4–10.2)
CHLORIDE SERPL-SCNC: 106 MMOL/L (ref 97–108)
CHOLEST SERPL-MCNC: 181 MG/DL
CO2 SERPL-SCNC: 26 MMOL/L (ref 22–30)
CREAT SERPL-MCNC: 0.83 MG/DL (ref 0.6–1.2)
GFR SERPL CREATININE-BSD FRML MDRD: 76 ML/MIN/1.73SQ M
GLUCOSE P FAST SERPL-MCNC: 96 MG/DL (ref 70–99)
HDLC SERPL-MCNC: 73 MG/DL
LDLC SERPL CALC-MCNC: 91 MG/DL
NONHDLC SERPL-MCNC: 108 MG/DL
POTASSIUM SERPL-SCNC: 4.3 MMOL/L (ref 3.6–5)
PROT SERPL-MCNC: 8.3 G/DL (ref 5.9–8.4)
SODIUM SERPL-SCNC: 141 MMOL/L (ref 137–147)
TRIGL SERPL-MCNC: 86 MG/DL

## 2022-01-26 PROCEDURE — 36415 COLL VENOUS BLD VENIPUNCTURE: CPT

## 2022-01-26 PROCEDURE — 80061 LIPID PANEL: CPT

## 2022-01-26 PROCEDURE — 80053 COMPREHEN METABOLIC PANEL: CPT

## 2022-01-26 PROCEDURE — 83036 HEMOGLOBIN GLYCOSYLATED A1C: CPT

## 2022-01-26 PROCEDURE — 82306 VITAMIN D 25 HYDROXY: CPT

## 2022-01-27 ENCOUNTER — HOSPITAL ENCOUNTER (OUTPATIENT)
Dept: RADIOLOGY | Facility: HOSPITAL | Age: 62
Discharge: HOME | End: 2022-01-27
Payer: COMMERCIAL

## 2022-01-27 DIAGNOSIS — M25.562 CHRONIC PAIN OF LEFT KNEE: ICD-10-CM

## 2022-01-27 DIAGNOSIS — G89.29 CHRONIC PAIN OF LEFT KNEE: ICD-10-CM

## 2022-01-27 LAB
EST. AVERAGE GLUCOSE BLD GHB EST-MCNC: 123 MG/DL
HBA1C MFR BLD: 5.9 %

## 2022-01-27 PROCEDURE — 73562 X-RAY EXAM OF KNEE 3: CPT

## 2022-02-16 ENCOUNTER — OFFICE VISIT (OUTPATIENT)
Dept: FAMILY MEDICINE CLINIC | Facility: CLINIC | Age: 62
End: 2022-02-16

## 2022-02-16 VITALS
HEART RATE: 101 BPM | HEIGHT: 63 IN | RESPIRATION RATE: 18 BRPM | WEIGHT: 225.3 LBS | SYSTOLIC BLOOD PRESSURE: 114 MMHG | TEMPERATURE: 98.2 F | BODY MASS INDEX: 39.92 KG/M2 | DIASTOLIC BLOOD PRESSURE: 62 MMHG | OXYGEN SATURATION: 97 %

## 2022-02-16 DIAGNOSIS — E66.01 MORBID OBESITY (HCC): ICD-10-CM

## 2022-02-16 DIAGNOSIS — I10 PRIMARY HYPERTENSION: Primary | ICD-10-CM

## 2022-02-16 DIAGNOSIS — R73.03 PREDIABETES: ICD-10-CM

## 2022-02-16 PROCEDURE — 99213 OFFICE O/P EST LOW 20 MIN: CPT

## 2022-02-16 NOTE — PROGRESS NOTES
Assessment/Plan:    Hypertension  BP Readings from Last 3 Encounters:   22 114/62   22 138/90   21 140/100     Continue nifedipine 30 mg daily  Morbid obesity ([facility] [address] )  She is planning to make an appt with weight management  Diagnoses and all orders for this visit:    Primary hypertension    Morbid obesity ([facility] [address] )    Prediabetes          Subjective:      Patient ID: Milagro Dempsey is a 62 y o  female  Milagro Dempsey is a 62 y o  female  has a past medical history of HTN, Peptic ulcer and Temporary cerebral vascular dysfunction  has a past surgical history that includes  section (0465,3784,5110)  She presents today for a HTN follow-up  The following portions of the patient's history were reviewed and updated as appropriate: allergies, current medications, past family history, past medical history, past social history, past surgical history and problem list     Review of Systems   Constitutional: Negative for chills and fever  HENT: Negative for ear pain and sore throat  Eyes: Negative for pain and visual disturbance  Respiratory: Negative for cough and shortness of breath  Cardiovascular: Negative for chest pain and palpitations  Gastrointestinal: Negative for abdominal pain and vomiting  Genitourinary: Negative for dysuria and hematuria  Musculoskeletal: Negative for arthralgias and back pain  Skin: Negative for color change and rash  Neurological: Negative for seizures and syncope  All other systems reviewed and are negative  Objective:      /62 (BP Location: Right arm, Patient Position: Sitting, Cuff Size: Standard)   Pulse 101   Temp 98 2 °F (36 8 °C) (Temporal)   Resp 18   Ht 5' 3" (1 6 m)   Wt 102 kg (225 lb 4 8 oz)   SpO2 97%   Breastfeeding No   BMI 39 91 kg/m²          Physical Exam  Vitals and nursing note reviewed  Constitutional:       Appearance: She is obese     HENT:      Head: Normocephalic and atraumatic  Right Ear: External ear normal       Left Ear: External ear normal       Nose: Nose normal    Eyes:      Extraocular Movements: Extraocular movements intact  Conjunctiva/sclera: Conjunctivae normal       Pupils: Pupils are equal, round, and reactive to light  Cardiovascular:      Rate and Rhythm: Normal rate and regular rhythm  Pulses: Normal pulses  Heart sounds: Normal heart sounds  Pulmonary:      Effort: Pulmonary effort is normal       Breath sounds: Normal breath sounds  Abdominal:      General: Bowel sounds are normal       Palpations: Abdomen is soft  Tenderness: There is no abdominal tenderness  Musculoskeletal:         General: Normal range of motion  Cervical back: Normal range of motion  Skin:     General: Skin is warm and dry  Neurological:      General: No focal deficit present  Mental Status: She is alert and oriented to person, place, and time  Mental status is at baseline  Psychiatric:         Mood and Affect: Mood normal          Behavior: Behavior normal          Thought Content:  Thought content normal

## 2022-02-16 NOTE — ASSESSMENT & PLAN NOTE
BP Readings from Last 3 Encounters:   02/16/22 114/62   01/19/22 138/90   12/14/21 140/100     Continue nifedipine 30 mg daily

## 2022-02-21 ENCOUNTER — OFFICE VISIT (OUTPATIENT)
Dept: OBGYN CLINIC | Facility: MEDICAL CENTER | Age: 62
End: 2022-02-21
Payer: COMMERCIAL

## 2022-02-21 VITALS
BODY MASS INDEX: 38.41 KG/M2 | WEIGHT: 225 LBS | HEART RATE: 84 BPM | HEIGHT: 64 IN | SYSTOLIC BLOOD PRESSURE: 122 MMHG | DIASTOLIC BLOOD PRESSURE: 83 MMHG

## 2022-02-21 DIAGNOSIS — M25.562 CHRONIC PAIN OF LEFT KNEE: ICD-10-CM

## 2022-02-21 DIAGNOSIS — G89.29 CHRONIC PAIN OF LEFT KNEE: ICD-10-CM

## 2022-02-21 DIAGNOSIS — M17.12 PRIMARY OSTEOARTHRITIS OF LEFT KNEE: Primary | ICD-10-CM

## 2022-02-21 PROCEDURE — 99203 OFFICE O/P NEW LOW 30 MIN: CPT | Performed by: EMERGENCY MEDICINE

## 2022-02-21 PROCEDURE — 3008F BODY MASS INDEX DOCD: CPT

## 2022-02-21 PROCEDURE — 3008F BODY MASS INDEX DOCD: CPT | Performed by: EMERGENCY MEDICINE

## 2022-02-21 PROCEDURE — 3079F DIAST BP 80-89 MM HG: CPT | Performed by: EMERGENCY MEDICINE

## 2022-02-21 PROCEDURE — 3074F SYST BP LT 130 MM HG: CPT | Performed by: EMERGENCY MEDICINE

## 2022-02-21 NOTE — PATIENT INSTRUCTIONS
You may use Voltaren gel every 6 hours as needed for pain and inflammation  You may also take Tylenol 500mg every 4-6 hours as needed OR max 1,000mg per dose up to 3 times per day for a total of 3,000mg per day  Check with your primary care physician to see if these medications are safe to take and to make sure they do not interfere with your other medications and medical issues  Artritis   LO QUE NECESITA SABER:   La artritis es dolor o enfermedad en bradley o más articulaciones  Existen muchos tipos de artritis  Los tipos jerson la artritis reumatoide provocan inflamación en las articulaciones  Otros tipos desgastan el [place] [address] articulaciones  [place] [facility] hace que los huesos de las articulaciones se rocen entre sí cuando usted mueve la articulación  Bradley infección por bacterias, un virus o un hongo también puede causar artritis  Lydia síntomas pueden ser constantes o pueden ser intermitentes  La artritis por lo general empeora con el paso del tiempo y puede provocar daños permanentes en la articulación  INSTRUCCIONES SOBRE EL [facility]ITAL[facility]:   Llame a zamora médico o reumatólogo si:  · Usted tiene fiebre y un dianelys dolor o inflamación en la articulación  · Usted no puede  la articulación afectada  · Usted tiene dolor articular intenso que no puede tolerar  · Usted tiene un sarpullido nuevo o que empeora  · Zamora dolor o inflamación no mejoran con el tratamiento  · Usted tiene preguntas o inquietudes acerca de zamora condición o cuidado  Medicamentos:  · Acetaminofén kenji el dolor y baja la fiebre  Está disponible sin receta médica  Pregunte la cantidad y la frecuencia con que debe tomarlos  [address]  [place] las etiquetas de todos los demás medicamentos que esté usando para saber si también contienen acetaminofén, o pregunte a zamora médico o farmacéutico  El acetaminofén puede causar daño en el hígado cuando no se poly de forma correcta   No use más de 4 gramos (4000 miligramos) en total de acetaminofeno en un día  · Los [place], jerson el ibuprofeno, [facility] a disminuir la inflamación, el dolor y la [place]  [place] medicamento está disponible con o sin bradley receta médica  Los MARTIN pueden causar sangrado estomacal o problemas renales en ciertas personas  Si usted poly un medicamento anticoagulante, siempre pregúntele a zamora médico si los MARTIN son seguros para usted  Siempre karl la etiqueta de alexa medicamento y [place] instrucciones  · Esteroides reducen la inflamación y el dolor  · Puede administrarse podrían administrarse  Pregunte al médico cómo debe trina alexa medicamento de forma corona  Algunos medicamentos recetados para el dolor contienen acetaminofén  No tome otros medicamentos que contengan acetaminofén sin consultarlo con zamora médico  Demasiado acetaminofeno puede causar daño al hígado  Los medicamentos recetados para el dolor podrían causar estreñimiento  Pregunte a zamora médico jerson prevenir o tratar estreñimiento  · [place] etienne medicamentos jerson se le haya indicado  Consulte con zamora médico si usted corbin que zamora medicamento no le está ayudando o si presenta efectos secundarios  Infórmele si es alérgico a algún medicamento  Mantenga bradley lista actualizada de los [place], las vitaminas y los productos herbales que poly  Incluya los siguientes datos de los medicamentos: cantidad, frecuencia y motivo de administración  Traiga con usted la lista o los envases de las píldoras a etienne citas de seguimiento  Lleve la lista de los medicamentos con usted en [place] de bradley emergencia  El manejo de etienne síntomas:  · Descanse zamora articulación adolorida para que pueda recuperarse  Zamora médico podría recomendarle que use muletas o un caminador si la articulación afectada está en bradley pierna  · Aplique hielo o calor a zamora articulación  Aplique hielo a zamora articulación  El hielo también puede contribuir a evitar el daño de los tejidos   Use bradley compresa de hielo o ponga hielo triturado en bradley bolsa de plástico  [place] con bradley toalla y póngasela en la articulación adolorida de 15 a 20 minutos [address] indicaciones  Usted puede aplicarse calor katarzyna 20 minutos cada 2 horas  Para el tratamiento con calor puede usar compresas calientes o bradley lámpara de calor  · Eleve zamora articulación  [place] le ayudará a disminuir la inflamación y el dolor  Eleve zamora articulación por encima del nivel del corazón con la frecuencia que pueda  [place] zamora articulación adolorida sobre almohadas para mantenerla cómodamente por encima del nivel del corazón  Controle la artritis:  · Informe a lydia médicos sobre los [place] poly para la artritis  Es posible que deba trina algunos medicamentos solamente si usted siente dolor vinculado con la artritis  Es posible que deba trina otros medicamentos todos los días para evitar que la artritis empeore  Lydia médicos le ayudarán a entender todos lydia medicamentos y cuándo tomarlos  Es importante que tome los medicamentos jerson se le indica, incluso si comienza a sentirse mejor  Usted puede continuar teniendo daño e inflamación articular, incluso si no lo siente  · Consuma alimentos saludables y variados  Los alimentos saludables incluyen frutas, verduras, pan integral, productos lácteos bajos en grasa, frijoles, ti magras y pescado  Pregunte si necesita seguir bradley dieta especial  Es posible que bradley dieta suma en calcio y vitamina D disminuya zamora riesgo de tener osteoporosis  Los [employer] en calcio incluyen [place], [place]-barre, brócoli y tofu  La vitamina D puede encontrarse en la carne, el pescado, la [place] fortificada, los cereales y el pan  Consulte si debe trina suplementos de calcio o de vitamina D             · Asista a terapia física u ocupacional jerson se le indique  Un fisioterapeuta le puede enseñar ejercicios para mejorar la flexibilidad y el rango de [place]   También le pueden mostrar ejercicios que no implican soporte de peso y que son [place] articulaciones jerson la natación  El ejercicio puede ayudarle con la flexibilidad de las articulaciones y a reducir el dolor  Un terapeuta ocupacional puede ayudarle para que aprenda a hacer etienne actividades cotidianas cuando etienne articulaciones estén rígidas o adoloridas  · Mantenga un peso saludable  El exceso de peso ejerce presión a etienne articulaciones  Pregunte a zamora proveedor cuál debería de ser [employer]  Si necesita perder peso, él puede ayudarle a crear un programa para bajar de peso  La pérdida de [employer] puede ayudar a reducir el dolor y aumentar zamora habilidad para llevar a cabo etienne actividades  · Use zapatos sin tacones o de tacones bajos  Blain le servirá para aliviar el dolor y a disminuir la presión que se ejerce en las articulaciones de zamora Tiki Balk y caderas  · No fume  La nicotina y otros químicos contenidos en los cigarrillos y cigarros pueden dañar los huesos y las articulaciones  Pida información a zamora médico si usted actualmente fuma y necesita ayuda para dejar de fumar  Los cigarrillos electrónicos o el tabaco sin humo igualmente contienen nicotina  Consulte con zamora médico antes de [employer]  Dispositivos de apoyo:  · Los zapatos o plantillas ortopédicos sirven para proporcionar soporte a zamora pies cuando camina  · Unas muletas, un bastón o un caminador podrían ayudar a reducir el riesgo de [place] caída  También disminuyen la presión [employer] articulaciones afectadas  · Los dispositivos para evitar las caídas incluyen los asientos elevados para el inodoro y las barras de apoyo de la marissa del baño para ayudarlo a levantarse cuando está sentado  Se pueden colocar barandas en las áreas donde usted necesite equilibrio y [de-identified]  · Los dispositivos para ayudar con el apoyo y el descanso incluyen férulas para usar en las naveed y [place] duerme   Use bradley almohada lo suficientemente firme para que le proporcione soporte al tomeka y a la jose  Programe bradley luiz con zamora médico o reumatólogo jerson se le indique: Anote etienne preguntas para que se acuerde de hacerlas katarzyna etienne visitas  © Copyright [employer] 2021 Information is for End User's use only and may not be sold, redistributed or otherwise used for commercial purposes  All illustrations and images included in CareNotes® are the copyrighted property of A D A M , [facility]  or [address] [place] [address] es sólo para uso en educación  Zamora intención no es darle un consejo médico sobre enfermedades o tratamientos  Colsulte con zamora Class[place] farmacéutico antes de seguir cualquier régimen médico para saber si es seguro y efectivo para usted

## 2022-02-21 NOTE — PROGRESS NOTES
Assessment/Plan:    Diagnoses and all orders for this visit:    Chronic pain of left knee  OA Left knee  -     Ambulatory Referral to Orthopedic Surgery  - Physical therapy      Left anterior medial knee pain most likely from degenerative changes  Patient may use Voltaren gel more frequently, add Tylenol  Start PT  Declines steroid injection    Chief Complaint:     Chief Complaint   Patient presents with    Left Knee - Pain       Subjective:   Patient ID: Itzel Bond is a 62 y o  female  NP presents for 5-6 months of left anterior knee pain no injury she experiences cracking and spasms, difficulty with stairs, using voltaren gel 2 times per week      Review of Systems    The following portions of the patient's chart were reviewed and updated as appropriate:      Allergie  Allergies   Allergen Reactions    Aspirin GI Intolerance    No Known Allergies    s   Allergen Reactions    Aspirin GI Intolerance    No Known Allergies       Diagnosis Date    Peptic ulcer 2017    Temporary cerebral vascular dysfunction 2014       Past Surgical History:   Procedure Laterality Date     SECTION  0811,8864,7118       Social History     Socioeconomic History    Marital status: /Civil Union     Spouse name: Not on file    Number of children: Not on file    Years of education: Not on file    Highest education level: Not on file   Occupational History    Not on file   Tobacco Use    Smoking status: Never Smoker    Smokeless tobacco: Never Used   Vaping Use    Vaping Use: Never used   Substance and Sexual Activity    Alcohol use: Yes     Comment: very rare, maybe 3 times a year    Drug use: Never    Sexual activity: Not Currently     Partners: Male   Other Topics Concern    Not on file   Social History Narrative    Not on file     Social Determinants of Health     Financial Resource Strain: Low Risk     Difficulty of Paying Living Expenses: Not hard at all   Food Insecurity: No Food Insecurity    Worried About Running Out of Food in the Last Year: Never true    Ran Out of Food in the Last Year: Never true   Transportation Needs: No Transportation Needs    Lack of Transportation (Medical): No    Lack of Transportation (Non-Medical):  No   Physical Activity: Not on file   Stress: Not on file   Social Connections: Not on file   Intimate Partner Violence: Not on file   Housing Stability: Low Risk     Unable to Pay for Housing in the Last Year: No    Number of Places Lived in the Last Year: 1    Unstable Housing in the Last Year: No       Family History   Problem Relation Age of Onset    Hypertension Mother     No Known Problems Sister     No Known Problems Sister     No Known Problems Sister     No Known Problems Sister     No Known Problems Sister     No Known Problems Sister     No Known Problems Maternal Aunt     No Known Problems Maternal Aunt     No Known Problems Maternal Aunt     No Known Problems Maternal Aunt     No Known Problems Maternal Aunt     No Known Problems Maternal Aunt     No Known Problems Maternal Aunt     No Known Problems Maternal Aunt     No Known Problems Maternal Aunt     No Known Problems Maternal Aunt     No Known Problems Maternal Aunt     No Known Problems Maternal Aunt     No Known Problems Paternal Aunt     No Known Problems Paternal Aunt     No Known Problems Paternal Aunt     No Known Problems Paternal Aunt        Medications:    Current Outpatient Medications:     Diclofenac Sodium (VOLTAREN) 1 %, Apply 2 g topically 4 (four) times a day, Disp: 100 g, Rfl: 3    NIFEdipine ER (ADALAT CC) 30 MG 24 hr tablet, Take 1 tablet (30 mg total) by mouth daily, Disp: 30 tablet, Rfl: 2    rosuvastatin (CRESTOR) 20 MG tablet, Take 1 tablet (20 mg total) by mouth daily, Disp: 30 tablet, Rfl: 5    Cholecalciferol (VITAMIN D3) 20 MCG (800 UNIT) TABS, Take 1 tablet by mouth daily (Patient not taking: Reported on 12/14/2021 ), Disp: 90 tablet, Rfl: 0    famotidine (PEPCID) 20 mg tablet, Take 1 tablet (20 mg total) by mouth daily (Patient not taking: Reported on 12/14/2021 ), Disp: 90 tablet, Rfl: 0    pantoprazole (PROTONIX) 40 mg tablet, TAKE 1 TABLET BY MOUTH DAILY (Patient not taking: Reported on 12/14/2021), Disp: 90 tablet, Rfl: 0    Patient Active Problem List   Diagnosis    Allergic rhinitis    Anxiety    Colon polyp    Depression    Gastroesophageal reflux disease    Hypertension    Hyperlipidemia    Migraine with aura    Morbid obesity ([facility] [address] )    Vitamin D deficiency    Sleep disorder    Chronic nonintractable headache    Transient ischemic attack    At risk for obstructive sleep apnea    Inadequate sleep hygiene    ANUPAM (obstructive sleep apnea)    Prediabetes    Encounter for physical examination related to employment    Encounter for routine gynecological examination with Papanicolaou smear of cervix       Objective:  /83   Pulse 84   Ht 5' 4" (1 626 m)   Wt 102 kg (225 lb)   BMI 38 62 kg/m²     Left Knee Exam     Tenderness   The patient is experiencing tenderness in the medial joint line  Range of Motion   The patient has normal left knee ROM  Other   Sensation: normal            Physical Exam      Neurologic Exam    Procedures    I have personally reviewed pertinent films in PACS and my interpretation is Minimal degenerative changes no acute findings

## 2022-04-04 ENCOUNTER — OFFICE VISIT (OUTPATIENT)
Dept: OBGYN CLINIC | Facility: MEDICAL CENTER | Age: 62
End: 2022-04-04
Payer: COMMERCIAL

## 2022-04-04 VITALS
HEART RATE: 94 BPM | HEIGHT: 64 IN | BODY MASS INDEX: 38.41 KG/M2 | WEIGHT: 225 LBS | SYSTOLIC BLOOD PRESSURE: 138 MMHG | DIASTOLIC BLOOD PRESSURE: 82 MMHG

## 2022-04-04 DIAGNOSIS — G89.29 CHRONIC PAIN OF LEFT KNEE: Primary | ICD-10-CM

## 2022-04-04 DIAGNOSIS — M25.562 CHRONIC PAIN OF LEFT KNEE: Primary | ICD-10-CM

## 2022-04-04 DIAGNOSIS — M17.12 PRIMARY OSTEOARTHRITIS OF LEFT KNEE: ICD-10-CM

## 2022-04-04 PROCEDURE — 99212 OFFICE O/P EST SF 10 MIN: CPT | Performed by: EMERGENCY MEDICINE

## 2022-04-04 PROCEDURE — 3079F DIAST BP 80-89 MM HG: CPT | Performed by: EMERGENCY MEDICINE

## 2022-04-04 PROCEDURE — 3008F BODY MASS INDEX DOCD: CPT | Performed by: EMERGENCY MEDICINE

## 2022-04-04 PROCEDURE — 3075F SYST BP GE 130 - 139MM HG: CPT | Performed by: EMERGENCY MEDICINE

## 2022-04-04 NOTE — PROGRESS NOTES
Assessment/Plan:    Diagnoses and all orders for this visit:    Chronic pain of left knee    Primary osteoarthritis of left knee    Patient much improved  Plans on starting PT  Previously denied steroid injection    Return for 6 weeks OR as needed  Chief Complaint:     Chief Complaint   Patient presents with    Left Knee - Follow-up       Subjective:   Patient ID: Montez Vasquez is a 62 y o  female  Patient returns with improvement of knee pain, Taking tylenol PRN  She was not able to participate in PT yet but plans on starting  Initial note:  NP presents for 5-6 months of left anterior knee pain no injury she experiences cracking and spasms, difficulty with stairs, using voltaren gel 2 times per week      Review of Systems    The following portions of the patient's chart were reviewed and updated as appropriate:    Allergy:    Allergies   Allergen Reactions    Aspirin GI Intolerance    No Known Allergies          Past Medical History:   Diagnosis Date    Peptic ulcer 2017    Temporary cerebral vascular dysfunction 2014       Past Surgical History:   Procedure Laterality Date     SECTION  1240,9613,8162       Social History     Socioeconomic History    Marital status: /Civil Union     Spouse name: Not on file    Number of children: Not on file    Years of education: Not on file    Highest education level: Not on file   Occupational History    Not on file   Tobacco Use    Smoking status: Never Smoker    Smokeless tobacco: Never Used   Vaping Use    Vaping Use: Never used   Substance and Sexual Activity    Alcohol use: Yes     Comment: very rare, maybe 3 times a year    Drug use: Never    Sexual activity: Not Currently     Partners: Male   Other Topics Concern    Not on file   Social History Narrative    Not on file     Social Determinants of Health     Financial Resource Strain: Low Risk     Difficulty of Paying Living Expenses: Not hard at all Food Insecurity: No Food Insecurity    Worried About Running Out of Food in the Last Year: Never true    Darlene of Food in the Last Year: Never true   Transportation Needs: No Transportation Needs    Lack of Transportation (Medical): No    Lack of Transportation (Non-Medical):  No   Physical Activity: Not on file   Stress: Not on file   Social Connections: Not on file   Intimate Partner Violence: Not on file   Housing Stability: Low Risk     Unable to Pay for Housing in the Last Year: No    Number of Places Lived in the Last Year: 1    Unstable Housing in the Last Year: No       Family History   Problem Relation Age of Onset    Hypertension Mother     No Known Problems Sister     No Known Problems Sister     No Known Problems Sister     No Known Problems Sister     No Known Problems Sister     No Known Problems Sister     No Known Problems Maternal Aunt     No Known Problems Maternal Aunt     No Known Problems Maternal Aunt     No Known Problems Maternal Aunt     No Known Problems Maternal Aunt     No Known Problems Maternal Aunt     No Known Problems Maternal Aunt     No Known Problems Maternal Aunt     No Known Problems Maternal Aunt     No Known Problems Maternal Aunt     No Known Problems Maternal Aunt     No Known Problems Maternal Aunt     No Known Problems Paternal Aunt     No Known Problems Paternal Aunt     No Known Problems Paternal Aunt     No Known Problems Paternal Aunt        Medications:    Current Outpatient Medications:     Diclofenac Sodium (VOLTAREN) 1 %, Apply 2 g topically 4 (four) times a day, Disp: 100 g, Rfl: 3    NIFEdipine ER (ADALAT CC) 30 MG 24 hr tablet, Take 1 tablet (30 mg total) by mouth daily, Disp: 30 tablet, Rfl: 2    rosuvastatin (CRESTOR) 20 MG tablet, Take 1 tablet (20 mg total) by mouth daily, Disp: 30 tablet, Rfl: 5    Cholecalciferol (VITAMIN D3) 20 MCG (800 UNIT) TABS, Take 1 tablet by mouth daily (Patient not taking: Reported on 12/14/2021 ), Disp: 90 tablet, Rfl: 0    famotidine (PEPCID) 20 mg tablet, Take 1 tablet (20 mg total) by mouth daily (Patient not taking: Reported on 12/14/2021 ), Disp: 90 tablet, Rfl: 0    pantoprazole (PROTONIX) 40 mg tablet, TAKE 1 TABLET BY MOUTH DAILY (Patient not taking: Reported on 12/14/2021), Disp: 90 tablet, Rfl: 0    Patient Active Problem List   Diagnosis    Allergic rhinitis    Anxiety    Colon polyp    Depression    Gastroesophageal reflux disease    Hypertension    Hyperlipidemia    Migraine with aura    Morbid obesity ([facility] [address] )    Vitamin D deficiency    Sleep disorder    Chronic nonintractable headache    Transient ischemic attack    At risk for obstructive sleep apnea    Inadequate sleep hygiene    ANUPAM (obstructive sleep apnea)    Prediabetes    Encounter for physical examination related to employment    Encounter for routine gynecological examination with Papanicolaou smear of cervix       Objective:  /82   Pulse 94   Ht 5' 4" (1 626 m)   Wt 102 kg (225 lb)   BMI 38 62 kg/m²     Ortho Exam    Physical Exam      Neurologic Exam    Procedures    I have personally reviewed the written report of the pertinent studies  LEFT KNEE     INDICATION:   [id number]: Pain in left knee  G89 29: Other chronic pain      COMPARISON:  None     VIEWS:  XR KNEE 3 VW LEFT NON INJURY   Images: 3     FINDINGS:  Small medial intercondylar loose body     There is no acute fracture or dislocation      There is no joint effusion      No significant degenerative changes      No lytic or blastic osseous lesion      Soft tissues are unremarkable      IMPRESSION:     No acute osseous abnormality

## 2022-05-11 ENCOUNTER — OFFICE VISIT (OUTPATIENT)
Dept: FAMILY MEDICINE CLINIC | Facility: CLINIC | Age: 62
End: 2022-05-11

## 2022-05-11 VITALS
RESPIRATION RATE: 18 BRPM | DIASTOLIC BLOOD PRESSURE: 88 MMHG | SYSTOLIC BLOOD PRESSURE: 146 MMHG | BODY MASS INDEX: 38.02 KG/M2 | WEIGHT: 222.7 LBS | HEART RATE: 88 BPM | OXYGEN SATURATION: 97 % | TEMPERATURE: 98.2 F | HEIGHT: 64 IN

## 2022-05-11 DIAGNOSIS — E66.9 OBESITY (BMI 30-39.9): ICD-10-CM

## 2022-05-11 DIAGNOSIS — J30.1 SEASONAL ALLERGIC RHINITIS DUE TO POLLEN: ICD-10-CM

## 2022-05-11 DIAGNOSIS — Z00.00 ANNUAL PHYSICAL EXAM: Primary | ICD-10-CM

## 2022-05-11 PROBLEM — Z72.821 INADEQUATE SLEEP HYGIENE: Status: RESOLVED | Noted: 2020-01-08 | Resolved: 2022-05-11

## 2022-05-11 PROBLEM — R51.9 CHRONIC NONINTRACTABLE HEADACHE: Status: RESOLVED | Noted: 2019-10-17 | Resolved: 2022-05-11

## 2022-05-11 PROBLEM — Z01.419 ENCOUNTER FOR ROUTINE GYNECOLOGICAL EXAMINATION WITH PAPANICOLAOU SMEAR OF CERVIX: Status: RESOLVED | Noted: 2021-12-14 | Resolved: 2022-05-11

## 2022-05-11 PROBLEM — G89.29 CHRONIC NONINTRACTABLE HEADACHE: Status: RESOLVED | Noted: 2019-10-17 | Resolved: 2022-05-11

## 2022-05-11 PROBLEM — Z02.89 ENCOUNTER FOR PHYSICAL EXAMINATION RELATED TO EMPLOYMENT: Status: RESOLVED | Noted: 2020-05-18 | Resolved: 2022-05-11

## 2022-05-11 PROCEDURE — 3079F DIAST BP 80-89 MM HG: CPT

## 2022-05-11 PROCEDURE — 99396 PREV VISIT EST AGE 40-64: CPT

## 2022-05-11 PROCEDURE — 3008F BODY MASS INDEX DOCD: CPT

## 2022-05-11 PROCEDURE — 3077F SYST BP >= 140 MM HG: CPT

## 2022-05-11 PROCEDURE — 3008F BODY MASS INDEX DOCD: CPT | Performed by: EMERGENCY MEDICINE

## 2022-05-11 RX ORDER — FLUTICASONE PROPIONATE 50 MCG
1 SPRAY, SUSPENSION (ML) NASAL DAILY
Qty: 16 G | Refills: 3 | Status: SHIPPED | OUTPATIENT
Start: 2022-05-11 | End: 2022-07-20 | Stop reason: SDUPTHER

## 2022-05-11 NOTE — PROGRESS NOTES
[facility]    NAME: Shola Ledesma  AGE: 62 y o  SEX: female  : 1960     DATE: 2022     Assessment and Plan:     Problem List Items Addressed This Visit        Respiratory    Allergic rhinitis    Relevant Medications    fluticasone (FLONASE) 50 mcg/act nasal spray       Other    Obesity (BMI 30-39  9)     BMI Counseling: Body mass index is 38 23 kg/m²  The BMI is above normal  Nutrition recommendations include reducing portion sizes, decreasing overall calorie intake, 3-5 servings of fruits/vegetables daily, reducing fast food intake, consuming healthier snacks, decreasing soda and/or juice intake, moderation in carbohydrate intake, increasing intake of lean protein, reducing intake of saturated fat and trans fat and reducing intake of cholesterol  Exercise recommendations include moderate aerobic physical activity for 150 minutes/week  Other Visit Diagnoses     Annual physical exam    -  Primary          Immunizations and preventive care screenings were discussed with patient today  Appropriate education was printed on patient's after visit summary  Counseling:  Alcohol/drug use: discussed moderation in alcohol intake, the recommendations for healthy alcohol use, and avoidance of illicit drug use  Dental Health: discussed importance of regular tooth brushing, flossing, and dental visits  Injury prevention: discussed safety/seat belts, safety helmets, smoke detectors, carbon dioxide detectors, and smoking near bedding or upholstery  Sexual health: discussed sexually transmitted diseases, partner selection, use of condoms, avoidance of unintended pregnancy, and contraceptive alternatives  · Exercise: the importance of regular exercise/physical activity was discussed  Recommend exercise 3-5 times per week for at least 30 minutes            Return in about 6 months (around 2022) for Recheck HTN  Chief Complaint:     Chief Complaint   Patient presents with    Physical Exam      History of Present Illness:     Adult Annual Physical   Patient here for a comprehensive physical exam  The patient reports none  Diet and Physical Activity  · Diet/Nutrition: well balanced diet  · Exercise: no formal exercise  Depression Screening  PHQ-2/9 Depression Screening         General Health  · Sleep: sleeps well  · Hearing: normal - bilateral   · Vision: most recent eye exam >1 year ago and wears glasses  · Dental: regular dental visits, brushes teeth twice daily and flosses teeth occasionally  /GYN Health  · Patient is: postmenopausal  · Contraceptive method: none       Review of Systems:     Review of Systems   Past Medical History:     Past Medical History:   Diagnosis Date    Depression 2014    Peptic ulcer 2017    Temporary cerebral vascular dysfunction 2014    Transient ischemic attack 2014      Past Surgical History:     Past Surgical History:   Procedure Laterality Date     SECTION  2049,2563,7640      Social History:     Social History     Socioeconomic History    Marital status: /Civil Union     Spouse name: None    Number of children: None    Years of education: None    Highest education level: None   Occupational History    None   Tobacco Use    Smoking status: Never Smoker    Smokeless tobacco: Never Used   Vaping Use    Vaping Use: Never used   Substance and Sexual Activity    Alcohol use: Yes     Comment: very rare, maybe 3 times a year    Drug use: Never    Sexual activity: Not Currently     Partners: Male   Other Topics Concern    None   Social History Narrative    None     Social Determinants of Health     Financial Resource Strain: Low Risk     Difficulty of Paying Living Expenses: Not hard at all   Food Insecurity: No Food Insecurity    Worried About Running Out of Food in the Last Year: Never true    Ran Out of Food in the Last Year: Never true   Transportation Needs: No Transportation Needs    Lack of Transportation (Medical): No    Lack of Transportation (Non-Medical): No   Physical Activity: Not on file   Stress: Not on file   Social Connections: Not on file   Intimate Partner Violence: Not on file   Housing Stability: Low Risk     Unable to Pay for Housing in the Last Year: No    Number of Places Lived in the Last Year: 1    Unstable Housing in the Last Year: No      Family History:     Family History   Problem Relation Age of Onset    Hypertension Mother     No Known Problems Sister     No Known Problems Sister     No Known Problems Sister     No Known Problems Sister     No Known Problems Sister     No Known Problems Sister     No Known Problems Maternal Aunt     No Known Problems Maternal Aunt     No Known Problems Maternal Aunt     No Known Problems Maternal Aunt     No Known Problems Maternal Aunt     No Known Problems Maternal Aunt     No Known Problems Maternal Aunt     No Known Problems Maternal Aunt     No Known Problems Maternal Aunt     No Known Problems Maternal Aunt     No Known Problems Maternal Aunt     No Known Problems Maternal Aunt     No Known Problems Paternal Aunt     No Known Problems Paternal Aunt     No Known Problems Paternal Aunt     No Known Problems Paternal Aunt       Current Medications:     Current Outpatient Medications   Medication Sig Dispense Refill    fluticasone (FLONASE) 50 mcg/act nasal spray 1 spray into each nostril in the morning  16 g 3    Diclofenac Sodium (VOLTAREN) 1 % Apply 2 g topically 4 (four) times a day 100 g 3    NIFEdipine ER (ADALAT CC) 30 MG 24 hr tablet TAKE 1 TABLET(30 MG) BY MOUTH DAILY 90 tablet 1    rosuvastatin (CRESTOR) 20 MG tablet Take 1 tablet (20 mg total) by mouth daily 30 tablet 5     No current facility-administered medications for this visit  Allergies:      Allergies   Allergen Reactions    Aspirin GI Intolerance    No Known Allergies       Physical Exam:     /88 (BP Location: Left arm, Patient Position: Sitting, Cuff Size: Standard)   Pulse 88   Temp 98 2 °F (36 8 °C) (Temporal)   Resp 18   Ht 5' 4" (1 626 m)   Wt 101 kg (222 lb 11 2 oz)   SpO2 97%   Breastfeeding No   BMI 38 23 kg/m²     Physical Exam     Greg Macias 35

## 2022-05-11 NOTE — PATIENT INSTRUCTIONS

## 2022-05-11 NOTE — ASSESSMENT & PLAN NOTE
BMI Counseling: Body mass index is 38 23 kg/m²  The BMI is above normal  Nutrition recommendations include reducing portion sizes, decreasing overall calorie intake, 3-5 servings of fruits/vegetables daily, reducing fast food intake, consuming healthier snacks, decreasing soda and/or juice intake, moderation in carbohydrate intake, increasing intake of lean protein, reducing intake of saturated fat and trans fat and reducing intake of cholesterol  Exercise recommendations include moderate aerobic physical activity for 150 minutes/week

## 2022-07-06 ENCOUNTER — TELEPHONE (OUTPATIENT)
Dept: FAMILY MEDICINE CLINIC | Facility: CLINIC | Age: 62
End: 2022-07-06

## 2022-07-06 NOTE — TELEPHONE ENCOUNTER
Pt came into the office today 07/06/22 requesting refill on the following med:     pantoprazole (PROTONIX) 40 mg tablet     Pt Next Appt: 07/20/22

## 2022-07-20 ENCOUNTER — OFFICE VISIT (OUTPATIENT)
Dept: FAMILY MEDICINE CLINIC | Facility: CLINIC | Age: 62
End: 2022-07-20

## 2022-07-20 VITALS
TEMPERATURE: 97.8 F | SYSTOLIC BLOOD PRESSURE: 138 MMHG | HEART RATE: 81 BPM | BODY MASS INDEX: 37.73 KG/M2 | RESPIRATION RATE: 18 BRPM | WEIGHT: 221 LBS | HEIGHT: 64 IN | OXYGEN SATURATION: 97 % | DIASTOLIC BLOOD PRESSURE: 82 MMHG

## 2022-07-20 DIAGNOSIS — K21.9 GASTROESOPHAGEAL REFLUX DISEASE WITHOUT ESOPHAGITIS: ICD-10-CM

## 2022-07-20 DIAGNOSIS — G47.9 SLEEP DISORDER: ICD-10-CM

## 2022-07-20 DIAGNOSIS — E78.01 FAMILIAL HYPERCHOLESTEROLEMIA: ICD-10-CM

## 2022-07-20 DIAGNOSIS — R73.03 PREDIABETES: ICD-10-CM

## 2022-07-20 DIAGNOSIS — R20.0 BILATERAL FINGER NUMBNESS: Primary | ICD-10-CM

## 2022-07-20 DIAGNOSIS — Z91.89 AT RISK FOR OBSTRUCTIVE SLEEP APNEA: ICD-10-CM

## 2022-07-20 DIAGNOSIS — J30.1 SEASONAL ALLERGIC RHINITIS DUE TO POLLEN: ICD-10-CM

## 2022-07-20 DIAGNOSIS — K59.09 OTHER CONSTIPATION: ICD-10-CM

## 2022-07-20 DIAGNOSIS — I10 PRIMARY HYPERTENSION: ICD-10-CM

## 2022-07-20 DIAGNOSIS — G43.109 MIGRAINE WITH AURA AND WITHOUT STATUS MIGRAINOSUS, NOT INTRACTABLE: ICD-10-CM

## 2022-07-20 DIAGNOSIS — E78.5 HYPERLIPIDEMIA, UNSPECIFIED HYPERLIPIDEMIA TYPE: ICD-10-CM

## 2022-07-20 PROCEDURE — 99214 OFFICE O/P EST MOD 30 MIN: CPT | Performed by: FAMILY MEDICINE

## 2022-07-20 PROCEDURE — 3725F SCREEN DEPRESSION PERFORMED: CPT | Performed by: FAMILY MEDICINE

## 2022-07-20 PROCEDURE — 3075F SYST BP GE 130 - 139MM HG: CPT | Performed by: FAMILY MEDICINE

## 2022-07-20 PROCEDURE — 3079F DIAST BP 80-89 MM HG: CPT | Performed by: FAMILY MEDICINE

## 2022-07-20 PROCEDURE — 3008F BODY MASS INDEX DOCD: CPT | Performed by: FAMILY MEDICINE

## 2022-07-20 RX ORDER — NIFEDIPINE 30 MG/1
30 TABLET, FILM COATED, EXTENDED RELEASE ORAL DAILY
Qty: 90 TABLET | Refills: 1 | Status: SHIPPED | OUTPATIENT
Start: 2022-07-20

## 2022-07-20 RX ORDER — ROSUVASTATIN CALCIUM 20 MG/1
20 TABLET, COATED ORAL DAILY
Qty: 30 TABLET | Refills: 5 | Status: SHIPPED | OUTPATIENT
Start: 2022-07-20

## 2022-07-20 RX ORDER — PANTOPRAZOLE SODIUM 40 MG/1
40 TABLET, DELAYED RELEASE ORAL
Qty: 30 TABLET | Refills: 3 | Status: SHIPPED | OUTPATIENT
Start: 2022-07-20

## 2022-07-20 RX ORDER — AMOXICILLIN 250 MG
1 CAPSULE ORAL DAILY
Qty: 30 TABLET | Refills: 3 | Status: SHIPPED | OUTPATIENT
Start: 2022-07-20

## 2022-07-20 RX ORDER — FLUTICASONE PROPIONATE 50 MCG
1 SPRAY, SUSPENSION (ML) NASAL DAILY
Qty: 16 G | Refills: 3 | Status: SHIPPED | OUTPATIENT
Start: 2022-07-20

## 2022-07-20 RX ORDER — SUMATRIPTAN 25 MG/1
25 TABLET, FILM COATED ORAL ONCE AS NEEDED
Qty: 10 TABLET | Refills: 0 | Status: SHIPPED | OUTPATIENT
Start: 2022-07-20

## 2022-07-20 NOTE — PROGRESS NOTES
Assessment/Plan:    Gastroesophageal reflux disease  EGD 2017-  antral ulcers and gastritis  Pantoprazole 40mg restarted to control epigastric symptoms    Prediabetes  Lab Results   Component Value Date    HGBA1C 5 9 (H) 01/26/2022   Prediabetes range    - Ordered Hgba1c    At risk for obstructive sleep apnea  Previous sleep study (02/2020) - Snoring with possible upper airway resistance  Short sleep latency with  High sleep efficiency suggesting sleep deprivation     - Referal to ENT  - Weight Loss    Sleep disorder  Previous sleep study (02/2020) - Snoring with possible upper airway resistance  Short sleep latency with  High sleep efficiency suggesting sleep deprivation     - Referal to ENT  - Melatonin 500mcg qhs    Obesity (BMI 30-39  9)  BMI counseling - Weight loss recommended  Diet - More fruits and vegtables   Exercise - At least 30 mins/day 5 days a week  Hyperlipidemia  Continue Rosuvastatin 20mg qd    - Lipid panel     Migraine with aura  Sumatriptan 25mg PRN    Allergic rhinitis  Fluticasone 50mcg 1 spray, nasal, daily    Bilateral finger numbness  Take breaks and avoid continuous repetitive hand movements  F/u in 3 months    Hypertension  BP of 138/82 at goal according to JNC-8 guidelines    - Continue Nifedipine 30mg qd    Other constipation  Infrequent BM  Straining with BM  - Senokot 8 6-50mg qd  - High fiber diet        Diagnoses and all orders for this visit:    Bilateral finger numbness    Gastroesophageal reflux disease without esophagitis  -     pantoprazole (PROTONIX) 40 mg tablet; Take 1 tablet (40 mg total) by mouth daily before breakfast    Other constipation  -     senna-docusate sodium (SENOKOT S) 8 6-50 mg per tablet; Take 1 tablet by mouth daily    Sleep disorder  -     Melatonin 500 MCG TBDP; Take 1 tablet (500 mcg total) by mouth daily at bedtime    At risk for obstructive sleep apnea  -     Ambulatory Referral to Otolaryngology;  Future    Seasonal allergic rhinitis due to pollen  -     fluticasone (FLONASE) 50 mcg/act nasal spray; 1 spray into each nostril daily    Prediabetes  -     Comprehensive metabolic panel; Future  -     Hemoglobin A1C; Future    Hyperlipidemia, unspecified hyperlipidemia type    Migraine with aura and without status migrainosus, not intractable  -     SUMAtriptan (Imitrex) 25 mg tablet; Take 1 tablet (25 mg total) by mouth once as needed for migraine for up to 1 dose    Familial hypercholesterolemia  -     Lipid Panel with Direct LDL reflex; Future  -     rosuvastatin (CRESTOR) 20 MG tablet; Take 1 tablet (20 mg total) by mouth daily    Primary hypertension  -     NIFEdipine ER (ADALAT CC) 30 MG 24 hr tablet; Take 1 tablet (30 mg total) by mouth daily          Subjective:      Patient ID: Tim Odonnell is a 62 y o  female  Interview was conducted with the assistance of a [id number]  (Pat Mann; ID# [id number])    Tim Odonnell is a 62 y o  female with a PMHx of HTN, HLD, TIA, Interatrial Septal Aneurysm, and GERD  She is presenting with a 2 week history of numbness and cramping at the tip of her fingers bilaterally  This occurred 2x and she noticed it after taking off her gloves at work  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  She denies any previous episodes  Patient also denies any pain and loss of sensation  Patient is also reporting that she has been having insomnia  She complains of difficulty falling asleep at the beginning of the night and waking up earlier than usual  She forgoes dinner to help her lose weight, which she feels affects her sleep  Patient also admits to using her phone and watching TV while in bed   Patient states she has infrequent bowel movements; going 2 days w/o a BM and strains a lot when she has a BM  She eats cereal for breakfast and consumes fruits and avoids bread  She states she drinks lots of water   Patient admits abdominal pain that comes about when she uses Excedrin for her migraines  Her last EGD was in 2017 and colonoscopy around the same time according to patient  The following portions of the patient's history were reviewed and updated as appropriate: allergies, current medications, past family history, past medical history, past social history, past surgical history and problem list     Review of Systems   Constitutional: Positive for chills  Negative for fever  HENT: Positive for congestion (with weather changes)  Negative for ear pain, rhinorrhea and sore throat  Respiratory: Negative for shortness of breath  Cardiovascular: Negative for chest pain and palpitations  Gastrointestinal: Positive for abdominal pain (with excedrin use), constipation and nausea  Genitourinary: Negative  Musculoskeletal: Negative for neck pain and neck stiffness  Neurological: Positive for numbness and headaches  Negative for dizziness and light-headedness  Objective:      /82 (BP Location: Left arm, Patient Position: Sitting, Cuff Size: Large)   Pulse 81   Temp 97 8 °F (36 6 °C) (Temporal)   Resp 18   Ht 5' 4" (1 626 m)   Wt 100 kg (221 lb)   SpO2 97%   BMI 37 93 kg/m²          Physical Exam  Vitals reviewed  Constitutional:       General: She is not in acute distress  Appearance: Normal appearance  She is well-developed  She is not diaphoretic  HENT:      Head: Normocephalic and atraumatic  Mouth/Throat:      Mouth: Mucous membranes are moist    Eyes:      Conjunctiva/sclera: Conjunctivae normal    Cardiovascular:      Rate and Rhythm: Normal rate and regular rhythm  Heart sounds: Normal heart sounds  No murmur heard  Pulmonary:      Effort: Pulmonary effort is normal  No respiratory distress  Breath sounds: Normal breath sounds  No wheezing or rales  Abdominal:      General: Bowel sounds are normal  There is no distension  Palpations: Abdomen is soft  Tenderness:  There is no abdominal tenderness  There is no guarding  Musculoskeletal:      Right wrist: No tenderness  Left wrist: No tenderness  Right hand: No tenderness  Left hand: No tenderness  Cervical back: Normal range of motion and neck supple  No rigidity or tenderness  Skin:     General: Skin is warm and dry  Neurological:      Mental Status: She is alert and oriented to person, place, and time     Psychiatric:         Mood and Affect: Mood normal          Behavior: Behavior normal          Negative Tinel's sign and Phalen's Maneuver

## 2022-07-21 NOTE — ASSESSMENT & PLAN NOTE
Lab Results   Component Value Date    HGBA1C 5 9 (H) 01/26/2022   Prediabetes range    - Ordered Hgba1c

## 2022-07-21 NOTE — ASSESSMENT & PLAN NOTE
BMI counseling - Weight loss recommended  Diet - More fruits and vegtables   Exercise - At least 30 mins/day 5 days a week

## 2022-07-21 NOTE — ASSESSMENT & PLAN NOTE
Previous sleep study (02/2020) - Snoring with possible upper airway resistance     Short sleep latency with  High sleep efficiency suggesting sleep deprivation     - Referal to ENT  - Weight Loss

## 2022-07-21 NOTE — ASSESSMENT & PLAN NOTE
Previous sleep study (02/2020) - Snoring with possible upper airway resistance     Short sleep latency with  High sleep efficiency suggesting sleep deprivation     - Referal to ENT  - Melatonin 500mcg qhs

## 2022-10-24 ENCOUNTER — TELEPHONE (OUTPATIENT)
Dept: FAMILY MEDICINE CLINIC | Facility: CLINIC | Age: 62
End: 2022-10-24

## 2022-10-24 NOTE — TELEPHONE ENCOUNTER
first attempt to contact patient   left message to return my call on answering machine    LVM INFORMING PT TO HAVE BW COMPLETED PRIOR APPT AS PER PROVIDER REQUEST

## 2022-10-24 NOTE — TELEPHONE ENCOUNTER
----- Message from Mayra Shields MD sent at 10/24/2022 12:11 PM EDT -----  Regarding: Labwork Reminder  Please assist in informing patient to go for labs before afternoon appointment tomorrow if possible     Thanks

## 2022-11-08 ENCOUNTER — APPOINTMENT (OUTPATIENT)
Dept: LAB | Facility: HOSPITAL | Age: 62
End: 2022-11-08

## 2022-11-08 DIAGNOSIS — E78.01 FAMILIAL HYPERCHOLESTEROLEMIA: ICD-10-CM

## 2022-11-08 DIAGNOSIS — R73.03 PREDIABETES: ICD-10-CM

## 2022-11-08 LAB
ALBUMIN SERPL BCP-MCNC: 4.2 G/DL (ref 3.5–5)
ALP SERPL-CCNC: 54 U/L (ref 43–122)
ALT SERPL W P-5'-P-CCNC: 19 U/L
ANION GAP SERPL CALCULATED.3IONS-SCNC: 8 MMOL/L (ref 5–14)
AST SERPL W P-5'-P-CCNC: 23 U/L (ref 14–36)
BILIRUB SERPL-MCNC: 0.4 MG/DL (ref 0.2–1)
BUN SERPL-MCNC: 19 MG/DL (ref 5–25)
CALCIUM SERPL-MCNC: 9.3 MG/DL (ref 8.4–10.2)
CHLORIDE SERPL-SCNC: 105 MMOL/L (ref 96–108)
CHOLEST SERPL-MCNC: 186 MG/DL
CO2 SERPL-SCNC: 29 MMOL/L (ref 21–32)
CREAT SERPL-MCNC: 0.96 MG/DL (ref 0.6–1.2)
EST. AVERAGE GLUCOSE BLD GHB EST-MCNC: 126 MG/DL
GFR SERPL CREATININE-BSD FRML MDRD: 64 ML/MIN/1.73SQ M
GLUCOSE P FAST SERPL-MCNC: 99 MG/DL (ref 70–99)
HBA1C MFR BLD: 6 %
HDLC SERPL-MCNC: 66 MG/DL
LDLC SERPL CALC-MCNC: 109 MG/DL
POTASSIUM SERPL-SCNC: 4.3 MMOL/L (ref 3.5–5.3)
PROT SERPL-MCNC: 7.8 G/DL (ref 6.4–8.4)
SODIUM SERPL-SCNC: 142 MMOL/L (ref 135–147)
TRIGL SERPL-MCNC: 57 MG/DL

## 2022-11-16 NOTE — RESULT ENCOUNTER NOTE
Left VM stating I wanted to discuss results  Labs are normal except A1c which remains in the Pre-Diabetic range  Patient will need to continue exercising or engaging in physical activity, lose weight, and reduce sugary/high carb diets

## 2022-12-06 DIAGNOSIS — I10 PRIMARY HYPERTENSION: ICD-10-CM

## 2022-12-07 RX ORDER — NIFEDIPINE 30 MG/1
TABLET, FILM COATED, EXTENDED RELEASE ORAL
Qty: 90 TABLET | Refills: 1 | OUTPATIENT
Start: 2022-12-07

## 2023-01-06 ENCOUNTER — TELEPHONE (OUTPATIENT)
Dept: FAMILY MEDICINE CLINIC | Facility: CLINIC | Age: 63
End: 2023-01-06

## 2023-01-06 NOTE — TELEPHONE ENCOUNTER
Pt called the nurse line stating she would like to confirm her appt  Per chart review pt doesn't have any appt schedules

## 2023-01-16 DIAGNOSIS — K21.9 GASTROESOPHAGEAL REFLUX DISEASE WITHOUT ESOPHAGITIS: ICD-10-CM

## 2023-01-17 RX ORDER — PANTOPRAZOLE SODIUM 40 MG/1
TABLET, DELAYED RELEASE ORAL
Qty: 30 TABLET | Refills: 3 | Status: SHIPPED | OUTPATIENT
Start: 2023-01-17

## 2023-01-31 ENCOUNTER — OFFICE VISIT (OUTPATIENT)
Dept: FAMILY MEDICINE CLINIC | Facility: CLINIC | Age: 63
End: 2023-01-31

## 2023-01-31 VITALS
DIASTOLIC BLOOD PRESSURE: 84 MMHG | TEMPERATURE: 98.6 F | HEIGHT: 64 IN | OXYGEN SATURATION: 98 % | SYSTOLIC BLOOD PRESSURE: 126 MMHG | WEIGHT: 223 LBS | HEART RATE: 78 BPM | BODY MASS INDEX: 38.07 KG/M2 | RESPIRATION RATE: 18 BRPM

## 2023-01-31 DIAGNOSIS — I10 PRIMARY HYPERTENSION: Primary | ICD-10-CM

## 2023-01-31 DIAGNOSIS — G43.109 MIGRAINE WITH AURA AND WITHOUT STATUS MIGRAINOSUS, NOT INTRACTABLE: ICD-10-CM

## 2023-01-31 DIAGNOSIS — E66.9 OBESITY (BMI 30-39.9): ICD-10-CM

## 2023-01-31 DIAGNOSIS — Z12.11 COLON CANCER SCREENING: ICD-10-CM

## 2023-01-31 DIAGNOSIS — Z12.31 ENCOUNTER FOR SCREENING MAMMOGRAM FOR BREAST CANCER: ICD-10-CM

## 2023-01-31 DIAGNOSIS — Z23 ENCOUNTER FOR IMMUNIZATION: ICD-10-CM

## 2023-01-31 DIAGNOSIS — E78.5 HYPERLIPIDEMIA, UNSPECIFIED HYPERLIPIDEMIA TYPE: ICD-10-CM

## 2023-01-31 RX ORDER — SUMATRIPTAN 25 MG/1
25 TABLET, FILM COATED ORAL ONCE AS NEEDED
Qty: 10 TABLET | Refills: 0 | Status: SHIPPED | OUTPATIENT
Start: 2023-01-31

## 2023-01-31 NOTE — ASSESSMENT & PLAN NOTE
BMI Counseling: Body mass index is 38 28 kg/m²  The BMI is above normal  Nutrition recommendations include reducing portion sizes, decreasing overall calorie intake, 3-5 servings of fruits/vegetables daily, reducing fast food intake, consuming healthier snacks, decreasing soda and/or juice intake, moderation in carbohydrate intake, increasing intake of lean protein, reducing intake of saturated fat and trans fat and reducing intake of cholesterol  Exercise recommendations include moderate aerobic physical activity for 150 minutes/week

## 2023-01-31 NOTE — PROGRESS NOTES
Name: Jamal Davis      : 1960      MRN: [id number]  Encounter Provider: OMAIRA Estes  Encounter Date: 2023   Encounter department: [address] [place] [address]     1  Primary hypertension  Assessment & Plan:  BP Readings from Last 3 Encounters:   23 126/84   22 138/82   22 146/88     - Continue Nifedipine 30 mg daily  -reviewed eating a low salt diet (such as the DASH diet), limiting alcohol, exercising, and wt loss  2  Migraine with aura and without status migrainosus, not intractable  Assessment & Plan:  - Stable  She uses Excedrin twice a week which relieves migraines  She uses sumatriptan about once a month when Excedrin does not work & this resolves her migraine  Continue prn Excedrin & sumatriptan  Orders:  -     SUMAtriptan (Imitrex) 25 mg tablet; Take 1 tablet (25 mg total) by mouth once as needed for migraine for up to 1 dose    3  Encounter for immunization  -     Age 15 y+, BOOSTER (BIVALENT): Simone 78 vac bivalent vicente-sucr    4  Encounter for screening mammogram for breast cancer  -     Mammo screening bilateral w 3d & cad; Future; Expected date: 2023    5  Colon cancer screening  -     Ambulatory referral for colonoscopy; Future    6  Obesity (BMI 30-39  9)  Assessment & Plan:  BMI Counseling: Body mass index is 38 28 kg/m²  The BMI is above normal  Nutrition recommendations include reducing portion sizes, decreasing overall calorie intake, 3-5 servings of fruits/vegetables daily, reducing fast food intake, consuming healthier snacks, decreasing soda and/or juice intake, moderation in carbohydrate intake, increasing intake of lean protein, reducing intake of saturated fat and trans fat and reducing intake of cholesterol  Exercise recommendations include moderate aerobic physical activity for 150 minutes/week          7  Hyperlipidemia, unspecified hyperlipidemia type  Assessment & Plan:  Lab Results   Component Value Date    CHOLESTEROL 186 2022    CHOLESTEROL 181 2022    CHOLESTEROL 292 (H) 2021     Lab Results   Component Value Date    HDL 66 2022    HDL 73 2022    HDL 72 2021     Lab Results   Component Value Date    TRIG 57 2022    TRIG 86 2022    TRIG 130 2021     Lab Results   Component Value Date    NONHDLC 108 2022    [place] 220 2021     - Continue with Crestor 20 mg daily           Subjective      Depewidania Gordon is a 65 y o  female  has a past medical history of Depression, Peptic ulcer, Temporary cerebral vascular dysfunction, and Transient ischemic attack  has a past surgical history that includes  section (1310,,3766)  She presents today for a follow-up of her chronic conditions  Denies any acute complaints  Review of Systems   Constitutional: Negative for chills and fever  HENT: Negative for ear pain and sore throat  Eyes: Negative for pain and visual disturbance  Respiratory: Negative for cough and shortness of breath  Cardiovascular: Negative for chest pain and palpitations  Gastrointestinal: Negative for abdominal pain and vomiting  Genitourinary: Negative for dysuria and hematuria  Musculoskeletal: Negative for arthralgias and back pain  Skin: Negative for color change and rash  Neurological: Negative for seizures and syncope  All other systems reviewed and are negative        Current Outpatient Medications on File Prior to Visit   Medication Sig   • Diclofenac Sodium (VOLTAREN) 1 % Apply 2 g topically 4 (four) times a day   • fluticasone (FLONASE) 50 mcg/act nasal spray 1 spray into each nostril daily   • Melatonin 500 MCG TBDP Take 1 tablet (500 mcg total) by mouth daily at bedtime   • NIFEdipine ER (ADALAT CC) 30 MG 24 hr tablet Take 1 tablet (30 mg total) by mouth daily   • pantoprazole (PROTONIX) 40 mg tablet TAKE 1 TABLET(40 MG) BY MOUTH DAILY BEFORE AND BREAKFAST   • rosuvastatin (CRESTOR) 20 MG tablet Take 1 tablet (20 mg total) by mouth daily   • senna-docusate sodium (SENOKOT S) 8 6-50 mg per tablet Take 1 tablet by mouth daily   • [DISCONTINUED] SUMAtriptan (Imitrex) 25 mg tablet Take 1 tablet (25 mg total) by mouth once as needed for migraine for up to 1 dose       Objective     /84 (BP Location: Left arm, Patient Position: Sitting, Cuff Size: Large)   Pulse 78   Temp 98 6 °F (37 °C) (Temporal)   Resp 18   Ht 5' 4" (1 626 m)   Wt 101 kg (223 lb)   SpO2 98%   BMI 38 28 kg/m²     Physical Exam  Vitals and nursing note reviewed  Constitutional:       Appearance: She is obese  HENT:      Head: Normocephalic and atraumatic  Right Ear: External ear normal       Left Ear: External ear normal       Nose: Nose normal    Eyes:      Extraocular Movements: Extraocular movements intact  Conjunctiva/sclera: Conjunctivae normal       Pupils: Pupils are equal, round, and reactive to light  Cardiovascular:      Rate and Rhythm: Normal rate and regular rhythm  Pulses: Normal pulses  Heart sounds: Normal heart sounds  Pulmonary:      Effort: Pulmonary effort is normal       Breath sounds: Normal breath sounds  Abdominal:      General: Bowel sounds are normal       Palpations: Abdomen is soft  Tenderness: There is no abdominal tenderness  Musculoskeletal:         General: Normal range of motion  Cervical back: Normal range of motion  Skin:     General: Skin is warm and dry  Neurological:      General: No focal deficit present  Mental Status: She is alert and oriented to person, place, and time  Mental status is at baseline  Psychiatric:         Mood and Affect: Mood normal          Behavior: Behavior normal          Thought Content:  Thought content normal          Judgment: Judgment normal        [place]

## 2023-01-31 NOTE — ASSESSMENT & PLAN NOTE
BP Readings from Last 3 Encounters:   01/31/23 126/84   07/20/22 138/82   05/11/22 146/88     - Continue Nifedipine 30 mg daily  -reviewed eating a low salt diet (such as the DASH diet), limiting alcohol, exercising, and wt loss

## 2023-01-31 NOTE — ASSESSMENT & PLAN NOTE
- Stable  She uses Excedrin twice a week which relieves migraines  She uses sumatriptan about once a month when Excedrin does not work & this resolves her migraine  Continue prn Excedrin & sumatriptan

## 2023-01-31 NOTE — ASSESSMENT & PLAN NOTE
Lab Results   Component Value Date    CHOLESTEROL 186 11/08/2022    CHOLESTEROL 181 01/26/2022    CHOLESTEROL 292 (H) 05/22/2021     Lab Results   Component Value Date    HDL 66 11/08/2022    HDL 73 01/26/2022    HDL 72 05/22/2021     Lab Results   Component Value Date    TRIG 57 11/08/2022    TRIG 86 01/26/2022    TRIG 130 05/22/2021     Lab Results   Component Value Date    [place] 108 01/26/2022    [place] 220 05/22/2021     - Continue with Crestor 20 mg daily

## 2023-04-27 ENCOUNTER — OFFICE VISIT (OUTPATIENT)
Dept: FAMILY MEDICINE CLINIC | Facility: CLINIC | Age: 63
End: 2023-04-27

## 2023-04-27 VITALS
OXYGEN SATURATION: 97 % | SYSTOLIC BLOOD PRESSURE: 138 MMHG | WEIGHT: 224 LBS | HEART RATE: 74 BPM | BODY MASS INDEX: 38.24 KG/M2 | DIASTOLIC BLOOD PRESSURE: 84 MMHG | RESPIRATION RATE: 18 BRPM | HEIGHT: 64 IN | TEMPERATURE: 98.1 F

## 2023-04-27 DIAGNOSIS — G43.109 MIGRAINE WITH AURA AND WITHOUT STATUS MIGRAINOSUS, NOT INTRACTABLE: ICD-10-CM

## 2023-04-27 DIAGNOSIS — K59.00 CONSTIPATION, UNSPECIFIED CONSTIPATION TYPE: ICD-10-CM

## 2023-04-27 DIAGNOSIS — K21.9 GASTROESOPHAGEAL REFLUX DISEASE WITHOUT ESOPHAGITIS: Primary | ICD-10-CM

## 2023-04-27 RX ORDER — OMEPRAZOLE 20 MG/1
20 CAPSULE, DELAYED RELEASE ORAL DAILY
Qty: 30 CAPSULE | Refills: 1 | Status: SHIPPED | OUTPATIENT
Start: 2023-04-27

## 2023-04-27 RX ORDER — SUMATRIPTAN 25 MG/1
25 TABLET, FILM COATED ORAL ONCE AS NEEDED
Qty: 9 TABLET | Refills: 0 | Status: SHIPPED | OUTPATIENT
Start: 2023-04-27

## 2023-04-27 RX ORDER — POLYETHYLENE GLYCOL 3350 17 G/17G
17 POWDER, FOR SOLUTION ORAL DAILY
Qty: 578 G | Refills: 1 | Status: SHIPPED | OUTPATIENT
Start: 2023-04-27

## 2023-04-27 NOTE — PROGRESS NOTES
"Name: Darleen Hollis      : 1960      MRN: [id number]  Encounter Provider: OMAIRA Mccann  Encounter Date: 2023   Encounter department: [address] [place] [address]     1  Gastroesophageal reflux disease without esophagitis  Assessment & Plan:  History of antral ulcers and gastritis, presenting with epigastric discomfort and occasional nausea, sensation of \"something moving\" in epigastric area  - Restart PPI: omeprazole 20 mg daily  - Avoid trigger foods, large meals, and lying down within 2 hours after eating  Avoid gas-producing foods such as cabbage, broccoli, beans   - Follow up with PCP in 2 weeks  Orders:  -     omeprazole (PriLOSEC) 20 mg delayed release capsule; Take 1 capsule (20 mg total) by mouth daily    2  Constipation, unspecified constipation type  Assessment & Plan:  Reports small BMs, constipation for months to years  - Increase intake of fluid, fiber/fruits/vegetables  - Miralax 1 capful daily in 8 oz fluid  Orders:  -     polyethylene glycol (GLYCOLAX) 17 GM/SCOOP powder; Take 17 g by mouth daily    3  Migraine with aura and without status migrainosus, not intractable  Assessment & Plan:  No longer taking Excedrin due to stomach upset  - Refilled Imitrex 25 mg PRN  Orders:  -     SUMAtriptan (Imitrex) 25 mg tablet; Take 1 tablet (25 mg total) by mouth once as needed for migraine         Subjective     HPI     Citizen of the [place] language interpretation services were utilized for this visit  Thuy Dorado presented to the office for a SAME DAY appt for chronic abdominal pain  Pt states she has had the pain for a few years, had an endoscopy 4-5 years ago  Pt reports epigastric discomfort, occasional nausea but no vomiting, and a sensation of something moving in the epigastric region, felt more when seated  Pt reports discomfort present whether having eaten or not  Denies diarrhea  BMs daily but small amounts   Reports constipation " for months to years  Pt not currently taking any GI medications, previously prescribed pantoprazole, stopped for unknown reason  Pt notes she had to stop taking Excedrin for her migraines because it bothers her stomach  Pt has been working to lose weight, has had intentional weight loss from 251 to 224 lbs  Review of Systems   Constitutional: Negative for activity change, appetite change, chills, fatigue, fever and unexpected weight change (intentional loss)  HENT: Negative for trouble swallowing  Respiratory: Negative for cough, chest tightness, shortness of breath and wheezing  Cardiovascular: Negative for chest pain and palpitations  Gastrointestinal: Positive for abdominal pain, constipation and nausea  Negative for blood in stool, diarrhea and vomiting  Genitourinary: Negative for difficulty urinating and dysuria  Neurological: Positive for headaches  Negative for dizziness  All other systems reviewed and are negative        Past Medical History:   Diagnosis Date    Depression 2014    Peptic ulcer 2017    Temporary cerebral vascular dysfunction 2014    Transient ischemic attack 2014     Past Surgical History:   Procedure Laterality Date     SECTION  3681,[id number],[id number]     Family History   Problem Relation Age of Onset    Hypertension Mother     No Known Problems Sister     No Known Problems Sister     No Known Problems Sister     No Known Problems Sister     No Known Problems Sister     No Known Problems Sister     No Known Problems Maternal Aunt     No Known Problems Maternal Aunt     No Known Problems Maternal Aunt     No Known Problems Maternal Aunt     No Known Problems Maternal Aunt     No Known Problems Maternal Aunt     No Known Problems Maternal Aunt     No Known Problems Maternal Aunt     No Known Problems Maternal Aunt     No Known Problems Maternal Aunt     No Known Problems Maternal Aunt     No Known Problems Maternal Aunt     No Known Problems Paternal Aunt     No Known Problems Paternal Aunt     No Known Problems Paternal Aunt     No Known Problems Paternal Aunt      Social History     Socioeconomic History    Marital status: /Civil Union     Spouse name: None    Number of children: None    Years of education: None    Highest education level: None   Occupational History    None   Tobacco Use    Smoking status: Never     Passive exposure: Never    Smokeless tobacco: Never   Vaping Use    Vaping Use: Never used   Substance and Sexual Activity    Alcohol use: Yes     Comment: very rare, maybe 3 times a year    Drug use: Never    Sexual activity: Not Currently     Partners: Male   Other Topics Concern    None   Social History Narrative    None     Social Determinants of Health     Financial Resource Strain: Low Risk     Difficulty of Paying Living Expenses: Not hard at all   Food Insecurity: No Food Insecurity    Worried About Running Out of Food in the Last Year: Never true    Darlene of Food in the Last Year: Never true   Transportation Needs: No Transportation Needs    Lack of Transportation (Medical): No    Lack of Transportation (Non-Medical):  No   Physical Activity: Not on file   Stress: Not on file   Social Connections: Not on file   Intimate Partner Violence: Not on file   Housing Stability: Low Risk     Unable to Pay for Housing in the Last Year: No    Number of Places Lived in the Last Year: 1    Unstable Housing in the Last Year: No     Current Outpatient Medications on File Prior to Visit   Medication Sig    Diclofenac Sodium (VOLTAREN) 1 % Apply 2 g topically 4 (four) times a day    fluticasone (FLONASE) 50 mcg/act nasal spray 1 spray into each nostril daily    Melatonin 500 MCG TBDP Take 1 tablet (500 mcg total) by mouth daily at bedtime    NIFEdipine ER (ADALAT CC) 30 MG 24 hr tablet TAKE 1 TABLET(30 MG) BY MOUTH DAILY    rosuvastatin (CRESTOR) 20 MG tablet TAKE 1 TABLET(20 MG) BY MOUTH DAILY "   senna-docusate sodium (SENOKOT S) 8 6-50 mg per tablet Take 1 tablet by mouth daily     Allergies   Allergen Reactions    Aspirin GI Intolerance    No Known Allergies      Immunization History   Administered Date(s) Administered    COVID-19 MODERNA VACC 0 5 ML IM 03/26/2021, 04/30/2021    COVID-19 Pfizer Vac BIVALENT Rudi-sucrose 12 Yr+ IM (BOOSTER ONLY) 01/31/2023    INFLUENZA 12/16/2015    Influenza, recombinant, quadrivalent,injectable, preservative free 10/17/2019, 09/21/2021    Influenza, seasonal, injectable 12/03/2007    Tdap 12/01/2017       Objective     /84 (BP Location: Left arm, Patient Position: Sitting, Cuff Size: Large)   Pulse 74   Temp 98 1 °F (36 7 °C) (Temporal)   Resp 18   Ht 5' 4\" (1 626 m)   Wt 102 kg (224 lb)   SpO2 97%   BMI 38 45 kg/m²     Physical Exam  Vitals reviewed  Constitutional:       General: She is not in acute distress  Appearance: She is obese  She is not ill-appearing or diaphoretic  HENT:      Head: Normocephalic and atraumatic  Mouth/Throat:      Mouth: Mucous membranes are moist    Cardiovascular:      Rate and Rhythm: Normal rate and regular rhythm  Heart sounds: Normal heart sounds  No murmur heard  Pulmonary:      Effort: Pulmonary effort is normal  No tachypnea  Breath sounds: Normal breath sounds  No decreased breath sounds or wheezing  Abdominal:      General: Bowel sounds are decreased  There is no distension  Palpations: Abdomen is soft  Tenderness: There is abdominal tenderness in the epigastric area  There is no guarding or rebound  Skin:     General: Skin is warm and dry  Neurological:      Mental Status: She is alert and oriented to person, place, and time     Psychiatric:         Attention and Perception: Attention normal          Mood and Affect: Mood and affect normal          Behavior: Behavior normal        Mushtaq Silver, CRNP  "

## 2023-05-02 PROBLEM — K59.00 CONSTIPATION: Status: ACTIVE | Noted: 2022-07-20

## 2023-05-02 NOTE — ASSESSMENT & PLAN NOTE
"History of antral ulcers and gastritis, presenting with epigastric discomfort and occasional nausea, sensation of \"something moving\" in epigastric area  - Restart PPI: omeprazole 20 mg daily  - Avoid trigger foods, large meals, and lying down within 2 hours after eating  Avoid gas-producing foods such as cabbage, broccoli, beans   - Follow up with PCP in 2 weeks    "

## 2023-05-02 NOTE — ASSESSMENT & PLAN NOTE
Reports small BMs, constipation for months to years  - Increase intake of fluid, fiber/fruits/vegetables  - Miralax 1 capful daily in 8 oz fluid

## 2023-05-07 ENCOUNTER — HOSPITAL ENCOUNTER (EMERGENCY)
Facility: HOSPITAL | Age: 63
Discharge: HOME | End: 2023-05-07
Attending: EMERGENCY MEDICINE

## 2023-05-07 ENCOUNTER — APPOINTMENT (EMERGENCY)
Dept: CT IMAGING | Facility: HOSPITAL | Age: 63
End: 2023-05-07

## 2023-05-07 VITALS
DIASTOLIC BLOOD PRESSURE: 91 MMHG | RESPIRATION RATE: 18 BRPM | SYSTOLIC BLOOD PRESSURE: 169 MMHG | TEMPERATURE: 97.3 F | OXYGEN SATURATION: 97 % | HEART RATE: 93 BPM | WEIGHT: 220.46 LBS | BODY MASS INDEX: 37.84 KG/M2

## 2023-05-07 DIAGNOSIS — G51.33 HEMIFACIAL SPASM AFFECTING BOTH SIDES OF FACE: Primary | ICD-10-CM

## 2023-05-07 LAB
ALBUMIN SERPL BCP-MCNC: 4.3 G/DL (ref 3.5–5)
ALP SERPL-CCNC: 53 U/L (ref 34–104)
ALT SERPL W P-5'-P-CCNC: 14 U/L (ref 7–52)
ANION GAP SERPL CALCULATED.3IONS-SCNC: 10 MMOL/L (ref 4–13)
AST SERPL W P-5'-P-CCNC: 20 U/L (ref 13–39)
BASOPHILS # BLD AUTO: 0.02 THOUSANDS/ÂΜL (ref 0–0.1)
BASOPHILS NFR BLD AUTO: 0 % (ref 0–1)
BILIRUB SERPL-MCNC: 0.61 MG/DL (ref 0.2–1)
BUN SERPL-MCNC: 16 MG/DL (ref 5–25)
CALCIUM SERPL-MCNC: 9.5 MG/DL (ref 8.4–10.2)
CHLORIDE SERPL-SCNC: 105 MMOL/L (ref 96–108)
CK SERPL-CCNC: 190 U/L (ref 26–192)
CO2 SERPL-SCNC: 25 MMOL/L (ref 21–32)
CREAT SERPL-MCNC: 0.85 MG/DL (ref 0.6–1.3)
EOSINOPHIL # BLD AUTO: 0.05 THOUSAND/ÂΜL (ref 0–0.61)
EOSINOPHIL NFR BLD AUTO: 1 % (ref 0–6)
ERYTHROCYTE [DISTWIDTH] IN BLOOD BY AUTOMATED COUNT: 13.5 % (ref 11.6–15.1)
GFR SERPL CREATININE-BSD FRML MDRD: 74 ML/MIN/1.73SQ M
GLUCOSE SERPL-MCNC: 97 MG/DL (ref 65–140)
HCT VFR BLD AUTO: 41.1 % (ref 34.8–46.1)
HGB BLD-MCNC: 13.9 G/DL (ref 11.5–15.4)
IMM GRANULOCYTES # BLD AUTO: 0.01 THOUSAND/UL (ref 0–0.2)
IMM GRANULOCYTES NFR BLD AUTO: 0 % (ref 0–2)
LYMPHOCYTES # BLD AUTO: 2.38 THOUSANDS/ÂΜL (ref 0.6–4.47)
LYMPHOCYTES NFR BLD AUTO: 36 % (ref 14–44)
MAGNESIUM SERPL-MCNC: 2.1 MG/DL (ref 1.9–2.7)
MCH RBC QN AUTO: 28.7 PG (ref 26.8–34.3)
MCHC RBC AUTO-ENTMCNC: 33.8 G/DL (ref 31.4–37.4)
MCV RBC AUTO: 85 FL (ref 82–98)
MONOCYTES # BLD AUTO: 0.6 THOUSAND/ÂΜL (ref 0.17–1.22)
MONOCYTES NFR BLD AUTO: 9 % (ref 4–12)
NEUTROPHILS # BLD AUTO: 3.48 THOUSANDS/ÂΜL (ref 1.85–7.62)
NEUTS SEG NFR BLD AUTO: 54 % (ref 43–75)
NRBC BLD AUTO-RTO: 0 /100 WBCS
PLATELET # BLD AUTO: 230 THOUSANDS/UL (ref 149–390)
PMV BLD AUTO: 10.9 FL (ref 8.9–12.7)
POTASSIUM SERPL-SCNC: 3.7 MMOL/L (ref 3.5–5.3)
PROT SERPL-MCNC: 7.1 G/DL (ref 6.4–8.4)
RBC # BLD AUTO: 4.84 MILLION/UL (ref 3.81–5.12)
SODIUM SERPL-SCNC: 140 MMOL/L (ref 135–147)
WBC # BLD AUTO: 6.54 THOUSAND/UL (ref 4.31–10.16)

## 2023-05-07 NOTE — Clinical Note
Paul Anthony was seen and treated in our emergency department on 5/7/2023  No restrictions            Diagnosis:     Mariana    She may return on this date: 05/08/2023         If you have any questions or concerns, please don't hesitate to call        Jackie Perry PA-C    ______________________________           _______________          _______________  Hospital Representative                              Date                                Time

## 2023-05-07 NOTE — ED PROVIDER NOTES
History  Chief Complaint   Patient presents with   • Facial Pain     Pt describes left side face twitching for 2 months      Patient is a 69-year-old female coming in for evaluation of facial twitching and pain over the last 2 months  States it started with eye twitching, which has now progressed into her left cheek  Now starting on the right eye  Patient is in no acute distress at this time, denies trauma, denies lightheadedness, fever, or any other segment symptoms  Facial Injury  Location:  Face  Time since incident:  2 months  Associated symptoms: no ear pain, no headaches, no nausea and no vomiting        Prior to Admission Medications   Prescriptions Last Dose Informant Patient Reported? Taking?    Diclofenac Sodium (VOLTAREN) 1 % Not Taking  No No   Sig: Apply 2 g topically 4 (four) times a day   Patient not taking: Reported on 2023   Melatonin 500 MCG TBDP Not Taking  No No   Sig: Take 1 tablet (500 mcg total) by mouth daily at bedtime   Patient not taking: Reported on 2023   NIFEdipine ER (ADALAT CC) 30 MG 24 hr tablet 2023  No Yes   Sig: TAKE 1 TABLET(30 MG) BY MOUTH DAILY   SUMAtriptan (Imitrex) 25 mg tablet Past Week  No Yes   Sig: Take 1 tablet (25 mg total) by mouth once as needed for migraine   fluticasone (FLONASE) 50 mcg/act nasal spray 2023  No Yes   Si spray into each nostril daily   omeprazole (PriLOSEC) 20 mg delayed release capsule 2023  No Yes   Sig: Take 1 capsule (20 mg total) by mouth daily   polyethylene glycol (GLYCOLAX) 17 GM/SCOOP powder 2023  No Yes   Sig: Take 17 g by mouth daily   rosuvastatin (CRESTOR) 20 MG tablet 2023  No Yes   Sig: TAKE 1 TABLET(20 MG) BY MOUTH DAILY   senna-docusate sodium (SENOKOT S) 8 6-50 mg per tablet 2023  No Yes   Sig: Take 1 tablet by mouth daily      Facility-Administered Medications: None       Past Medical History:   Diagnosis Date   • Depression 2014   • Peptic ulcer 2017   • Temporary cerebral vascular dysfunction 2014   • Transient ischemic attack 2014       Past Surgical History:   Procedure Laterality Date   •  SECTION  [id number]       Family History   Problem Relation Age of Onset   • Hypertension Mother    • No Known Problems Sister    • No Known Problems Sister    • No Known Problems Sister    • No Known Problems Sister    • No Known Problems Sister    • No Known Problems Sister    • No Known Problems Maternal Aunt    • No Known Problems Maternal Aunt    • No Known Problems Maternal Aunt    • No Known Problems Maternal Aunt    • No Known Problems Maternal Aunt    • No Known Problems Maternal Aunt    • No Known Problems Maternal Aunt    • No Known Problems Maternal Aunt    • No Known Problems Maternal Aunt    • No Known Problems Maternal Aunt    • No Known Problems Maternal Aunt    • No Known Problems Maternal Aunt    • No Known Problems Paternal Aunt    • No Known Problems Paternal Aunt    • No Known Problems Paternal Aunt    • No Known Problems Paternal Aunt      I have reviewed and agree with the history as documented  E-Cigarette/Vaping   • E-Cigarette Use Never User      E-Cigarette/Vaping Substances   • Nicotine No    • THC No    • CBD No    • Flavoring No    • Other No    • Unknown No      Social History     Tobacco Use   • Smoking status: Never     Passive exposure: Never   • Smokeless tobacco: Never   Vaping Use   • Vaping Use: Never used   Substance Use Topics   • Alcohol use: Yes     Comment: very rare, maybe 3 times a year   • Drug use: Never       Review of Systems   Constitutional: Negative for fatigue and fever  HENT: Negative for drooling, ear pain and trouble swallowing  Gastrointestinal: Negative for nausea and vomiting  Neurological: Negative for headaches  Physical Exam  Physical Exam  Vitals reviewed  Constitutional:       Appearance: Normal appearance  She is normal weight  HENT:      Head: Normocephalic and atraumatic        Comments: Patient does have twitching of the left cheek  Small  No pain  No facial droop  No numbness or paresthesias of the face  Patient has full extraocular movements     Right Ear: External ear normal       Left Ear: External ear normal       Nose: Nose normal    Eyes:      Conjunctiva/sclera: Conjunctivae normal    Cardiovascular:      Rate and Rhythm: Normal rate  Pulmonary:      Effort: Pulmonary effort is normal    Musculoskeletal:         General: Normal range of motion  Cervical back: Normal range of motion  Skin:     General: Skin is warm and dry  Neurological:      Mental Status: She is alert           Vital Signs  ED Triage Vitals [05/07/23 1429]   Temperature Pulse Respirations Blood Pressure SpO2   (!) 97 3 °F (36 3 °C) 93 18 169/91 97 %      Temp Source Heart Rate Source Patient Position - Orthostatic VS BP Location FiO2 (%)   Tympanic Monitor Sitting Left arm --      Pain Score       --           Vitals:    05/07/23 1429   BP: 169/91   Pulse: 93   Patient Position - Orthostatic VS: Sitting         Visual Acuity      ED Medications  Medications - No data to display    Diagnostic Studies  Results Reviewed     Procedure Component Value Units Date/Time    Comprehensive metabolic panel [[id number]] Collected: 05/07/23 1457    Lab Status: Final result Specimen: Blood from Arm, Left Updated: 05/07/23 1526     Sodium 140 mmol/L      Potassium 3 7 mmol/L      Chloride 105 mmol/L      CO2 25 mmol/L      ANION GAP 10 mmol/L      BUN 16 mg/dL      Creatinine 0 85 mg/dL      Glucose 97 mg/dL      Calcium 9 5 mg/dL      AST 20 U/L      ALT 14 U/L      Alkaline Phosphatase 53 U/L      Total Protein 7 1 g/dL      Albumin 4 3 g/dL      Total Bilirubin 0 61 mg/dL      eGFR 74 ml/min/1 73sq m     Narrative:      [place] guidelines for Chronic Kidney Disease (CKD):   •  Stage 1 with normal or high GFR (GFR > 90 mL/min/1 73 square meters)  •  Stage 2 Mild CKD (GFR = 60-89 mL/min/1 73 square meters)  •  Stage 3A Moderate CKD (GFR = 45-59 mL/min/1 73 square meters)  •  Stage 3B Moderate CKD (GFR = 30-44 mL/min/1 73 square meters)  •  Stage 4 Severe CKD (GFR = 15-29 mL/min/1 73 square meters)  •  Stage 5 End Stage CKD (GFR <15 mL/min/1 73 square meters)  Note: GFR calculation is accurate only with a steady state creatinine    Magnesium [[id number]]  (Normal) Collected: 05/07/23 1457    Lab Status: Final result Specimen: Blood from Arm, Left Updated: 05/07/23 1526     Magnesium 2 1 mg/dL     CK [[id number]]  (Normal) Collected: 05/07/23 1457    Lab Status: Final result Specimen: Blood from Arm, Left Updated: 05/07/23 1524     Total  U/L     CBC and differential [[id number]] Collected: 05/07/23 1457    Lab Status: Final result Specimen: Blood from Arm, Left Updated: 05/07/23 1508     WBC 6 54 Thousand/uL      RBC 4 84 Million/uL      Hemoglobin 13 9 g/dL      Hematocrit 41 1 %      MCV 85 fL      MCH 28 7 pg      MCHC 33 8 g/dL      RDW 13 5 %      MPV 10 9 fL      Platelets 176 Thousands/uL      nRBC 0 /100 WBCs      Neutrophils Relative 54 %      Immat GRANS % 0 %      Lymphocytes Relative 36 %      Monocytes Relative 9 %      Eosinophils Relative 1 %      Basophils Relative 0 %      Neutrophils Absolute 3 48 Thousands/µL      Immature Grans Absolute 0 01 Thousand/uL      Lymphocytes Absolute 2 38 Thousands/µL      Monocytes Absolute 0 60 Thousand/µL      Eosinophils Absolute 0 05 Thousand/µL      Basophils Absolute 0 02 Thousands/µL                  CT head without contrast    (Results Pending)              Procedures  Procedures         ED Course                               SBIRT 22yo+    Flowsheet Row Most Recent Value   Initial Alcohol Screen: US AUDIT-C     1  How often do you have a drink containing alcohol? 0 Filed at: 05/07/2023 1433   2  How many drinks containing alcohol do you have on a typical day you are drinking? 1 Filed at: 05/07/2023 1433   3b  FEMALE Any Age, or MALE 65+:  How often do you have 4 or more drinks on one occassion? 1 Filed at: 05/07/2023 1433   Audit-C Score 2 Filed at: 05/07/2023 1433   SEBLE: How many times in the past year have you    Used an illegal drug or used a prescription medication for non-medical reasons? Never Filed at: 05/07/2023 1433                    Medical Decision Making  Patient is a 71-year-old female coming in for evaluation of facial twitching on the left side  Lab work overall was normal   CAT scan was obtained, however due to technical issues, unable to get results or see images  Based on patient story, discussed with patient, decision was made to discharge home with follow-up with results  Patient was referred to neurology for further evaluation  Based on patient story, as well as symptoms, I suspect this is a hemiplegia spasm, which currently has progressed to both sides  Amount and/or Complexity of Data Reviewed  Labs: ordered  Radiology: ordered  Disposition  Final diagnoses:   Hemifacial spasm affecting both sides of face     Time reflects when diagnosis was documented in both MDM as applicable and the Disposition within this note     Time User Action Codes Description Comment    5/7/2023  4:19 PM Marino Boo Add [G51 33] Hemifacial spasm affecting both sides of face       ED Disposition     ED Disposition   Discharge    Condition   Stable    Date/Time   Sun May 7, 2023  4:17 PM    [address] discharge to home/self care                 Follow-up Information     Follow up With Specialties Details Why Contact Info Additional Information    OMAIRA Pandya Nurse Practitioner, Family Medicine   [address] [facility] [phone]  [address] [facility] Emergency Department Emergency Medicine  As needed, If symptoms worsen [address] [id number]  [facility] Emergency Department          Discharge Medication List as of 5/7/2023  4:22 PM      CONTINUE these medications which have NOT CHANGED    Details   fluticasone (FLONASE) 50 mcg/act nasal spray 1 spray into each nostril daily, Starting Wed 7/20/2022, Normal      NIFEdipine ER (ADALAT CC) 30 MG 24 hr tablet TAKE 1 TABLET(30 MG) BY MOUTH DAILY, Normal      omeprazole (PriLOSEC) 20 mg delayed release capsule Take 1 capsule (20 mg total) by mouth daily, Starting Thu 4/27/2023, Normal      polyethylene glycol (GLYCOLAX) 17 GM/SCOOP powder Take 17 g by mouth daily, Starting Thu 4/27/2023, Normal      rosuvastatin (CRESTOR) 20 MG tablet TAKE 1 TABLET(20 MG) BY MOUTH DAILY, Normal      senna-docusate sodium (SENOKOT S) 8 6-50 mg per tablet Take 1 tablet by mouth daily, Starting Wed 7/20/2022, Normal      SUMAtriptan (Imitrex) 25 mg tablet Take 1 tablet (25 mg total) by mouth once as needed for migraine, Starting Thu 4/27/2023, Normal      Diclofenac Sodium (VOLTAREN) 1 % Apply 2 g topically 4 (four) times a day, Starting Wed 1/19/2022, Normal      Melatonin 500 MCG TBDP Take 1 tablet (500 mcg total) by mouth daily at bedtime, Starting Wed 7/20/2022, Normal                 PDMP Review     None          ED Provider  Electronically Signed by           Anna Guevara PA-C  05/07/23 3233

## 2023-05-07 NOTE — Clinical Note
Itzel Fung was seen and treated in our emergency department on 5/7/2023  No restrictions            Diagnosis:     Mariana    She may return on this date: 05/08/2023         If you have any questions or concerns, please don't hesitate to call        Lanette Reynolds PA-C    ______________________________           _______________          _______________  Hospital Representative                              Date                                Time

## 2023-05-07 NOTE — Clinical Note
Derian Peña was seen and treated in our emergency department on 5/7/2023  No restrictions            Diagnosis:     Mariana    She may return on this date: 05/09/2023         If you have any questions or concerns, please don't hesitate to call        Nely Piña PA-C    ______________________________           _______________          _______________  Hospital Representative                              Date                                Time

## 2023-05-07 NOTE — Clinical Note
Sridhar Ortiz was seen and treated in our emergency department on 5/7/2023  No restrictions            Diagnosis:     Nico Maldonado  may return to work on return date  She may return on this date: 05/09/2023         If you have any questions or concerns, please don't hesitate to call        Cary Garrison PA-C    ______________________________           _______________          _______________  Hospital Representative                              Date                                Time

## 2023-05-30 LAB
LEFT EYE DIABETIC RETINOPATHY: NORMAL
RIGHT EYE DIABETIC RETINOPATHY: NORMAL

## 2023-06-06 ENCOUNTER — OFFICE VISIT (OUTPATIENT)
Dept: FAMILY MEDICINE CLINIC | Facility: CLINIC | Age: 63
End: 2023-06-06

## 2023-06-06 VITALS
TEMPERATURE: 98.2 F | HEIGHT: 64 IN | RESPIRATION RATE: 19 BRPM | WEIGHT: 221 LBS | BODY MASS INDEX: 37.73 KG/M2 | DIASTOLIC BLOOD PRESSURE: 80 MMHG | HEART RATE: 81 BPM | SYSTOLIC BLOOD PRESSURE: 126 MMHG | OXYGEN SATURATION: 97 %

## 2023-06-06 DIAGNOSIS — Z00.00 ANNUAL PHYSICAL EXAM: Primary | ICD-10-CM

## 2023-06-06 DIAGNOSIS — K21.9 GASTROESOPHAGEAL REFLUX DISEASE WITHOUT ESOPHAGITIS: ICD-10-CM

## 2023-06-06 DIAGNOSIS — I10 PRIMARY HYPERTENSION: ICD-10-CM

## 2023-06-06 PROCEDURE — 99396 PREV VISIT EST AGE 40-64: CPT

## 2023-06-06 NOTE — PROGRESS NOTES
[address] [facility]    NAME: Leopoldo Fines  AGE: 65 y o  SEX: female  : 1960     DATE: 2023     Assessment and Plan:     Problem List Items Addressed This Visit        Digestive    Gastroesophageal reflux disease       Cardiovascular and Mediastinum    Hypertension   Other Visit Diagnoses     Annual physical exam    -  Primary          Immunizations and preventive care screenings were discussed with patient today  Appropriate education was printed on patient's after visit summary  Counseling:  Alcohol/drug use: discussed moderation in alcohol intake, the recommendations for healthy alcohol use, and avoidance of illicit drug use  Dental Health: discussed importance of regular tooth brushing, flossing, and dental visits  Injury prevention: discussed safety/seat belts, safety helmets, smoke detectors, carbon dioxide detectors, and smoking near bedding or upholstery  Sexual health: discussed sexually transmitted diseases, partner selection, use of condoms, avoidance of unintended pregnancy, and contraceptive alternatives  Exercise: the importance of regular exercise/physical activity was discussed  Recommend exercise 3-5 times per week for at least 30 minutes  Return in about 6 months (around 2023) for Recheck htn  Chief Complaint:     Chief Complaint   Patient presents with   • Physical Exam      History of Present Illness:     Adult Annual Physical   Patient here for a comprehensive physical exam  The patient reports no problems  Diet and Physical Activity  · Diet/Nutrition: well balanced diet  · Exercise: no formal exercise        Depression Screening  PHQ-2/9 Depression Screening    Little interest or pleasure in doing things: 0 - not at all  Feeling down, depressed, or hopeless: 0 - not at all  PHQ-2 Score: 0  PHQ-2 Interpretation: Negative depression screen       General Health  · Sleep: sleeps well and gets 7-8 hours of sleep on average  · Hearing: normal - bilateral   · Vision: goes for regular eye exams, most recent eye exam <1 year ago and wears glasses  · Dental: regular dental visits, brushes teeth twice daily and flosses teeth occasionally  /GYN Health  · Patient is: postmenopausal     Review of Systems:     Review of Systems   Constitutional: Negative for chills and fever  HENT: Negative for ear pain and sore throat  Eyes: Negative for pain and visual disturbance  Respiratory: Negative for cough and shortness of breath  Cardiovascular: Negative for chest pain and palpitations  Gastrointestinal: Negative for abdominal pain and vomiting  Genitourinary: Negative for dysuria and hematuria  Musculoskeletal: Positive for arthralgias  Negative for back pain  Skin: Negative for color change and rash  Neurological: Negative for seizures and syncope  All other systems reviewed and are negative       Past Medical History:     Past Medical History:   Diagnosis Date   • Depression 2014   • Peptic ulcer 2017   • Temporary cerebral vascular dysfunction 2014   • Transient ischemic attack 2014      Past Surgical History:     Past Surgical History:   Procedure Laterality Date   •  SECTION  4630,7750,9766      Social History:     Social History     Socioeconomic History   • Marital status: /Civil Union     Spouse name: None   • Number of children: None   • Years of education: None   • Highest education level: None   Occupational History   • None   Tobacco Use   • Smoking status: Never     Passive exposure: Never   • Smokeless tobacco: Never   Vaping Use   • Vaping Use: Never used   Substance and Sexual Activity   • Alcohol use: Yes     Comment: very rare, maybe 3 times a year   • Drug use: Never   • Sexual activity: Not Currently     Partners: Male   Other Topics Concern   • None   Social History Narrative   • None     Social Determinants of Health     Financial Resource Strain: Low Risk  (7/20/2022)    Overall Financial Resource Strain (CARDIA)    • Difficulty of Paying Living Expenses: Not hard at all   Food Insecurity: No Food Insecurity (7/20/2022)    Hunger Vital Sign    • Worried About Running Out of Food in the Last Year: Never true    • Ran Out of Food in the Last Year: Never true   Transportation Needs: No Transportation Needs (7/20/2022)    PRAPARE - Transportation    • Lack of Transportation (Medical): No    • Lack of Transportation (Non-Medical):  No   Physical Activity: Not on file   Stress: Not on file   Social Connections: Not on file   Intimate Partner Violence: Not on file   Housing Stability: Low Risk  (7/20/2022)    Housing Stability Vital Sign    • Unable to Pay for Housing in the Last Year: No    • Number of Places Lived in the Last Year: 1    • Unstable Housing in the Last Year: No      Family History:     Family History   Problem Relation Age of Onset   • Hypertension Mother    • No Known Problems Sister    • No Known Problems Sister    • No Known Problems Sister    • No Known Problems Sister    • No Known Problems Sister    • No Known Problems Sister    • No Known Problems Maternal Aunt    • No Known Problems Maternal Aunt    • No Known Problems Maternal Aunt    • No Known Problems Maternal Aunt    • No Known Problems Maternal Aunt    • No Known Problems Maternal Aunt    • No Known Problems Maternal Aunt    • No Known Problems Maternal Aunt    • No Known Problems Maternal Aunt    • No Known Problems Maternal Aunt    • No Known Problems Maternal Aunt    • No Known Problems Maternal Aunt    • No Known Problems Paternal Aunt    • No Known Problems Paternal Aunt    • No Known Problems Paternal Aunt    • No Known Problems Paternal Aunt       Current Medications:     Current Outpatient Medications   Medication Sig Dispense Refill   • Diclofenac Sodium (VOLTAREN) 1 % Apply 2 g topically 4 (four) times a day (Patient not "taking: Reported on 5/7/2023) 100 g 3   • fluticasone (FLONASE) 50 mcg/act nasal spray 1 spray into each nostril daily 16 g 3   • Melatonin 500 MCG TBDP Take 1 tablet (500 mcg total) by mouth daily at bedtime (Patient not taking: Reported on 5/7/2023) 30 tablet 1   • NIFEdipine ER (ADALAT CC) 30 MG 24 hr tablet TAKE 1 TABLET(30 MG) BY MOUTH DAILY 90 tablet 0   • omeprazole (PriLOSEC) 20 mg delayed release capsule Take 1 capsule (20 mg total) by mouth daily 30 capsule 1   • polyethylene glycol (GLYCOLAX) 17 GM/SCOOP powder Take 17 g by mouth daily 578 g 1   • rosuvastatin (CRESTOR) 20 MG tablet TAKE 1 TABLET(20 MG) BY MOUTH DAILY 30 tablet 1   • senna-docusate sodium (SENOKOT S) 8 6-50 mg per tablet Take 1 tablet by mouth daily 30 tablet 3   • SUMAtriptan (Imitrex) 25 mg tablet Take 1 tablet (25 mg total) by mouth once as needed for migraine 9 tablet 0     No current facility-administered medications for this visit  Allergies: Allergies   Allergen Reactions   • Aspirin GI Intolerance   • No Known Allergies       Physical Exam:     /80 (BP Location: Left arm, Patient Position: Sitting, Cuff Size: Large)   Pulse 81   Temp 98 2 °F (36 8 °C) (Temporal)   Resp 19   Ht 5' 4\" (1 626 m)   Wt 100 kg (221 lb)   SpO2 97%   BMI 37 93 kg/m²     Physical Exam  Vitals and nursing note reviewed  Constitutional:       General: She is not in acute distress  Appearance: Normal appearance  She is well-developed  She is obese  HENT:      Head: Normocephalic and atraumatic  Right Ear: External ear normal       Left Ear: External ear normal       Nose: Nose normal    Eyes:      Conjunctiva/sclera: Conjunctivae normal    Cardiovascular:      Rate and Rhythm: Normal rate and regular rhythm  Pulses: Normal pulses  Heart sounds: Normal heart sounds  No murmur heard  Pulmonary:      Effort: Pulmonary effort is normal  No respiratory distress  Breath sounds: Normal breath sounds     Abdominal:    " Palpations: Abdomen is soft  Tenderness: There is no abdominal tenderness  Musculoskeletal:         General: Normal range of motion  Cervical back: Normal range of motion and neck supple  Skin:     General: Skin is warm and dry  Neurological:      Mental Status: She is alert and oriented to person, place, and time  Mental status is at baseline  Psychiatric:         Mood and Affect: Mood normal          Behavior: Behavior normal          Thought Content: Thought content normal          Judgment: Judgment normal         No results found    Julienne García, [address]

## 2023-06-09 DIAGNOSIS — I10 PRIMARY HYPERTENSION: ICD-10-CM

## 2023-06-09 DIAGNOSIS — E78.01 FAMILIAL HYPERCHOLESTEROLEMIA: ICD-10-CM

## 2023-06-10 RX ORDER — ROSUVASTATIN CALCIUM 20 MG/1
TABLET, COATED ORAL
Qty: 30 TABLET | Refills: 1 | Status: SHIPPED | OUTPATIENT
Start: 2023-06-10

## 2023-06-10 RX ORDER — NIFEDIPINE 30 MG/1
TABLET, FILM COATED, EXTENDED RELEASE ORAL
Qty: 90 TABLET | Refills: 0 | Status: SHIPPED | OUTPATIENT
Start: 2023-06-10

## 2023-06-13 ENCOUNTER — OFFICE VISIT (OUTPATIENT)
Dept: FAMILY MEDICINE CLINIC | Facility: CLINIC | Age: 63
End: 2023-06-13

## 2023-06-13 VITALS
BODY MASS INDEX: 36.88 KG/M2 | TEMPERATURE: 98.7 F | SYSTOLIC BLOOD PRESSURE: 112 MMHG | OXYGEN SATURATION: 97 % | HEIGHT: 64 IN | RESPIRATION RATE: 16 BRPM | DIASTOLIC BLOOD PRESSURE: 70 MMHG | WEIGHT: 216 LBS | HEART RATE: 107 BPM

## 2023-06-13 DIAGNOSIS — E66.9 OBESITY (BMI 30-39.9): ICD-10-CM

## 2023-06-13 DIAGNOSIS — I10 PRIMARY HYPERTENSION: ICD-10-CM

## 2023-06-13 DIAGNOSIS — K08.89 PAIN, DENTAL: ICD-10-CM

## 2023-06-13 DIAGNOSIS — G43.109 MIGRAINE WITH AURA AND WITHOUT STATUS MIGRAINOSUS, NOT INTRACTABLE: ICD-10-CM

## 2023-06-13 DIAGNOSIS — M25.561 ACUTE PAIN OF RIGHT KNEE: Primary | ICD-10-CM

## 2023-06-13 PROCEDURE — 99213 OFFICE O/P EST LOW 20 MIN: CPT | Performed by: INTERNAL MEDICINE

## 2023-06-13 RX ORDER — SUMATRIPTAN 25 MG/1
25 TABLET, FILM COATED ORAL ONCE AS NEEDED
Qty: 9 TABLET | Refills: 0 | Status: SHIPPED | OUTPATIENT
Start: 2023-06-13

## 2023-06-13 NOTE — ASSESSMENT & PLAN NOTE
Does not satisfy October rules and therefore no indication for x-ray at this time  Likely knee sprain on the background of possible osteoarthritis  - Recommended naproxen twice daily up to 5 days with additional Tylenol for analgesic effect  May utilize diclofenac gel as needed  - Elevation of knee, ice compress 20 minutes 3 times daily, bandage to minimize swelling   - If pain or swelling worsens patient recommended to call for physical therapy if persisting beyond 1 to 2 weeks  - Some medial joint line laxity noted on her  and therefore benefit of MRI if symptoms worsening  To assess medial collateral ligament injury    - Work sick note given  - Gentle movement demonstrated and recommended as tolerated

## 2023-06-13 NOTE — ASSESSMENT & PLAN NOTE
Left first molar upper region dental bruising and tenderness noted  No tenderness on percussion of the tooth  No swelling or fluctuance on palpation  No fever   - Patient recommended follow-up with dentist for dental examination and management as deemed appropriate  NSAIDs as needed for pain management

## 2023-06-13 NOTE — PROGRESS NOTES
Name: Ana Ga      : 1960      MRN: [id number]  Encounter Provider: Freddy Baker MD  Encounter Date: 2023   Encounter department: [facility]     1  Acute pain of right knee  Assessment & Plan:  Does not satisfy October rules and therefore no indication for x-ray at this time  Likely knee sprain on the background of possible osteoarthritis  - Recommended naproxen twice daily up to 5 days with additional Tylenol for analgesic effect  May utilize diclofenac gel as needed  - Elevation of knee, ice compress 20 minutes 3 times daily, bandage to minimize swelling   - If pain or swelling worsens patient recommended to call for physical therapy if persisting beyond 1 to 2 weeks  - Some medial joint line laxity noted on her  and therefore benefit of MRI if symptoms worsening  To assess medial collateral ligament injury  - Work sick note given  - Gentle movement demonstrated and recommended as tolerated        2  Pain, dental  Assessment & Plan:  Left first molar upper region dental bruising and tenderness noted  No tenderness on percussion of the tooth  No swelling or fluctuance on palpation  No fever   - Patient recommended follow-up with dentist for dental examination and management as deemed appropriate  NSAIDs as needed for pain management  3  Migraine with aura and without status migrainosus, not intractable  -     SUMAtriptan (Imitrex) 25 mg tablet; Take 1 tablet (25 mg total) by mouth once as needed for migraine    4  Primary hypertension  Assessment & Plan:  Blood pressure 112/70 today  Well-controlled on current management nifedipine 30 mg daily  Continue current management  5  Obesity (BMI 30-39  9)  Assessment & Plan:  Patient reports 5 pound weight loss since last office visit and very excited about this achievement    Congratulated - has made dietary changes and cutting out sugary drinks and not eating beyond 8 PM and consuming healthier fruit snacks  Patient encouraged to continue in this effort for better weight management and minimize risks of other comorbidities  Subjective      Flora Canchola is a very pleasant 65 y o  female who has a PMH of migraine, GERD and presents today with complaints of left upper tooth pain and right knee pain  Patient's chronic medical conditions are stable unless noted otherwise above  Patient reports compliance on medications prescribed with no side effects experienced at this time  This patient has had no admissions to hospital or medical emergencies since the last visit to our office  Patient has no further complaints other than what is mentioned below and in the ROS  Patient reported getting out of bed on Saturday morning (4 days ago) and twisted her knee while putting on her right shoe with pain to medial aspect of the knee  Patient able to ambulate after this incident  Did not hear a popping sound  Had worsening swelling to medial aspect of knee but no increased warmth or major deformity noted  Walking in office today with limp but no assistive device  Review of Systems   Constitutional: Negative for chills and fever  Eyes: Negative for visual disturbance  Respiratory: Negative for cough and shortness of breath  Cardiovascular: Negative for chest pain and palpitations  Musculoskeletal: Positive for arthralgias  Negative for back pain  Skin: Negative for color change and rash  All other systems reviewed and are negative        Current Outpatient Medications on File Prior to Visit   Medication Sig   • Diclofenac Sodium (VOLTAREN) 1 % Apply 2 g topically 4 (four) times a day (Patient not taking: Reported on 5/7/2023)   • fluticasone (FLONASE) 50 mcg/act nasal spray 1 spray into each nostril daily   • Melatonin 500 MCG TBDP Take 1 tablet (500 mcg total) by mouth daily at bedtime (Patient not taking: Reported on 5/7/2023)   • "NIFEdipine ER (ADALAT CC) 30 MG 24 hr tablet TAKE 1 TABLET(30 MG) BY MOUTH DAILY   • omeprazole (PriLOSEC) 20 mg delayed release capsule Take 1 capsule (20 mg total) by mouth daily   • polyethylene glycol (GLYCOLAX) 17 GM/SCOOP powder Take 17 g by mouth daily   • rosuvastatin (CRESTOR) 20 MG tablet TAKE 1 TABLET(20 MG) BY MOUTH DAILY   • senna-docusate sodium (SENOKOT S) 8 6-50 mg per tablet Take 1 tablet by mouth daily   • [DISCONTINUED] SUMAtriptan (Imitrex) 25 mg tablet Take 1 tablet (25 mg total) by mouth once as needed for migraine       Objective     /70 (BP Location: Right arm, Patient Position: Sitting, Cuff Size: Large)   Pulse (!) 107   Temp 98 7 °F (37 1 °C) (Temporal)   Resp 16   Ht 5' 4\" (1 626 m)   Wt 98 kg (216 lb)   SpO2 97%   BMI 37 08 kg/m²     Physical Exam  Vitals reviewed  Constitutional:       General: She is not in acute distress  Appearance: She is obese  HENT:      Head: Atraumatic  Eyes:      Conjunctiva/sclera: Conjunctivae normal    Cardiovascular:      Rate and Rhythm: Normal rate and regular rhythm  Pulses: Normal pulses  Heart sounds: Normal heart sounds  No murmur heard  Pulmonary:      Effort: Pulmonary effort is normal       Breath sounds: Normal breath sounds  Abdominal:      General: Abdomen is flat  Bowel sounds are normal  There is no distension  Palpations: Abdomen is soft  Tenderness: There is no abdominal tenderness  Musculoskeletal:         General: Swelling ( Right medial knee) and tenderness ( Right medial knee) present  Normal range of motion  Cervical back: Normal range of motion  Right lower leg: No edema  Left lower leg: No edema  Skin:     General: Skin is warm and dry  Neurological:      General: No focal deficit present  Mental Status: She is alert     Psychiatric:         Behavior: Behavior normal        Jean Pierre Madrigal MD  "

## 2023-06-13 NOTE — ASSESSMENT & PLAN NOTE
Blood pressure 112/70 today  Well-controlled on current management nifedipine 30 mg daily  Continue current management

## 2023-06-13 NOTE — ASSESSMENT & PLAN NOTE
Patient reports 5 pound weight loss since last office visit and very excited about this achievement  Congratulated - has made dietary changes and cutting out sugary drinks and not eating beyond 8 PM and consuming healthier fruit snacks  Patient encouraged to continue in this effort for better weight management and minimize risks of other comorbidities

## 2023-06-13 NOTE — LETTER
June 13, 2023     Patient: [address]  YOB: 1960  Date of Visit: 6/13/2023      To Whom it May Concern:    [address] is under my professional care  Irven File was seen in my office on 6/13/2023  Irven File may return to work on Friday, June 16, 2023   If you have any questions or concerns, please don't hesitate to call           Sincerely,          Allison Martinez MD

## 2023-08-11 DIAGNOSIS — E78.01 FAMILIAL HYPERCHOLESTEROLEMIA: ICD-10-CM

## 2023-08-16 RX ORDER — ROSUVASTATIN CALCIUM 20 MG/1
TABLET, COATED ORAL
Qty: 30 TABLET | Refills: 1 | Status: SHIPPED | OUTPATIENT
Start: 2023-08-16

## 2023-08-24 ENCOUNTER — OFFICE VISIT (OUTPATIENT)
Dept: FAMILY MEDICINE CLINIC | Facility: CLINIC | Age: 63
End: 2023-08-24

## 2023-08-24 VITALS
BODY MASS INDEX: 36.37 KG/M2 | HEART RATE: 96 BPM | TEMPERATURE: 98.4 F | WEIGHT: 213 LBS | SYSTOLIC BLOOD PRESSURE: 136 MMHG | DIASTOLIC BLOOD PRESSURE: 74 MMHG | HEIGHT: 64 IN | OXYGEN SATURATION: 98 % | RESPIRATION RATE: 18 BRPM

## 2023-08-24 DIAGNOSIS — R07.9 CHEST PAIN, UNSPECIFIED TYPE: Primary | ICD-10-CM

## 2023-08-24 DIAGNOSIS — F41.8 DEPRESSION WITH ANXIETY: ICD-10-CM

## 2023-08-24 DIAGNOSIS — K21.9 GASTROESOPHAGEAL REFLUX DISEASE WITHOUT ESOPHAGITIS: ICD-10-CM

## 2023-08-24 PROCEDURE — 99214 OFFICE O/P EST MOD 30 MIN: CPT

## 2023-08-24 PROCEDURE — 93000 ELECTROCARDIOGRAM COMPLETE: CPT

## 2023-08-24 RX ORDER — ESCITALOPRAM OXALATE 10 MG/1
10 TABLET ORAL DAILY
Qty: 60 TABLET | Refills: 0 | Status: SHIPPED | OUTPATIENT
Start: 2023-08-24

## 2023-08-24 NOTE — PROGRESS NOTES
Name: Renita Rosales      : 1960      MRN: [id number]  Encounter Provider: OMAIRA Davison  Encounter Date: 2023   Encounter department: [address] [facility]     1. Chest pain, unspecified type  Assessment & Plan:  EKG showed NSR with no acute changes. - Depression with increase in stress levels could definitely be contributing. However, given patients risk factors (female, obesity, hld, htn, prediabetes), would like to rule out cardiac causes. Check labs as well    Orders:  -     POCT ECG  -     Echo complete w/ contrast if indicated; Future; Expected date: 2023  -     Stress test only, exercise; Future; Expected date: 2023  -     Comprehensive metabolic panel; Future  -     CBC and differential; Future  -     TSH, 3rd generation with Free T4 reflex; Future  -     Lipid panel; Future    2. Depression with anxiety  Assessment & Plan:  PHQ-2/9 Depression Screening    Little interest or pleasure in doing things: 2 - more than half the days  Feeling down, depressed, or hopeless: 3 - nearly every day  Trouble falling or staying asleep, or sleeping too much: 1 - several days  Feeling tired or having little energy: 1 - several days  Poor appetite or overeating: 3 - nearly every day  Feeling bad about yourself - or that you are a failure or have let yourself or your family down: 1 - several days  Trouble concentrating on things, such as reading the newspaper or watching television: 1 - several days  Moving or speaking so slowly that other people could have noticed.  Or the opposite - being so fidgety or restless that you have been moving around a lot more than usual: 0 - not at all  Thoughts that you would be better off dead, or of hurting yourself in some way: 0 - not at all  PHQ-2 Score: 5  PHQ-2 Interpretation: POSITIVE depression screen  PHQ-9 Score: 12   PHQ-9 Interpretation: Moderate depression      - Reviewed lifestyle management such as exercise, meditation, drinking 64 oz of water daily, and following a healthy diet. - Start Lexapro 10 mg daily. Discussed expected outcomes and possible side effects. Provided patient with list of local mental health resources        Orders:  -     escitalopram (Lexapro) 10 mg tablet; Take 1 tablet (10 mg total) by mouth daily    3. Gastroesophageal reflux disease without esophagitis  Assessment & Plan: This is controlled with prn omeprazole. This chest pain is different from GERD symptoms. Subjective      Dionne  is a 65 y.o. female  has a past medical history of Depression, Peptic ulcer, Temporary cerebral vascular dysfunction, and Transient ischemic attack. has a past surgical history that includes  section (3352,2930,1012). Dionne Kate is a 65 y.o. female who presents for evaluation of chest pain. Onset was 1 month ago. Also occurred about 2-3 months ago but has been increasing in frequency over the last month. Symptoms have been unchanged since that time. The patient describes the pain as dull and radiates to the upper back. Patient rates pain as moderatein intensity. Associated symptoms are: SOB. Aggravating factors are: emotional stress. Alleviating factors are: none. Patient's cardiac risk factors are: dyslipidemia, hypertension, obesity (BMI >= 30 kg/m2), sedentary lifestyle and prediabetes. Patient's risk factors for DVT/PE: none. Previous cardiac testing: none. She does report significant increase in stress & sadness since her brother passed about about a month ago. Review of Systems   Constitutional: Negative for chills and fever. HENT: Negative for ear pain and sore throat. Eyes: Negative for pain and visual disturbance. Respiratory: Negative for cough and shortness of breath. Cardiovascular: Positive for chest pain. Negative for palpitations and leg swelling.    Gastrointestinal: Negative for abdominal pain and vomiting. Genitourinary: Negative for dysuria and hematuria. Musculoskeletal: Positive for back pain. Negative for arthralgias. Skin: Negative for color change and rash. Neurological: Negative for seizures and syncope. Psychiatric/Behavioral: The patient is nervous/anxious. All other systems reviewed and are negative. Current Outpatient Medications on File Prior to Visit   Medication Sig   • Diclofenac Sodium (VOLTAREN) 1 % Apply 2 g topically 4 (four) times a day (Patient not taking: Reported on 5/7/2023)   • fluticasone (FLONASE) 50 mcg/act nasal spray 1 spray into each nostril daily   • Melatonin 500 MCG TBDP Take 1 tablet (500 mcg total) by mouth daily at bedtime (Patient not taking: Reported on 5/7/2023)   • NIFEdipine ER (ADALAT CC) 30 MG 24 hr tablet TAKE 1 TABLET(30 MG) BY MOUTH DAILY   • omeprazole (PriLOSEC) 20 mg delayed release capsule TAKE 1 CAPSULE(20 MG) BY MOUTH DAILY   • polyethylene glycol (GLYCOLAX) 17 GM/SCOOP powder Take 17 g by mouth daily   • rosuvastatin (CRESTOR) 20 MG tablet TAKE 1 TABLET(20 MG) BY MOUTH DAILY   • senna-docusate sodium (SENOKOT S) 8.6-50 mg per tablet Take 1 tablet by mouth daily   • SUMAtriptan (Imitrex) 25 mg tablet Take 1 tablet (25 mg total) by mouth once as needed for migraine       Objective     /74 (BP Location: Left arm, Patient Position: Sitting, Cuff Size: Large)   Pulse 96   Temp 98.4 °F (36.9 °C) (Temporal)   Resp 18   Ht 5' 4" (1.626 m)   Wt 96.6 kg (213 lb)   SpO2 98%   BMI 36.56 kg/m²     Physical Exam  Vitals and nursing note reviewed. Constitutional:       Appearance: She is obese. HENT:      Head: Normocephalic and atraumatic. Right Ear: External ear normal.      Left Ear: External ear normal.      Nose: Nose normal.   Eyes:      Extraocular Movements: Extraocular movements intact. Conjunctiva/sclera: Conjunctivae normal.      Pupils: Pupils are equal, round, and reactive to light.    Cardiovascular:      Rate and Rhythm: Normal rate and regular rhythm. Pulses: Normal pulses. Heart sounds: Normal heart sounds. Pulmonary:      Effort: Pulmonary effort is normal.      Breath sounds: Normal breath sounds. Chest:      Comments: Chest pain is NOT reproducible with palpitation, arm flexion maneuver nor crowing rooster maneuver. Abdominal:      General: Bowel sounds are normal.      Palpations: Abdomen is soft. Tenderness: There is no abdominal tenderness. Musculoskeletal:         General: Normal range of motion. Cervical back: Normal range of motion. Skin:     General: Skin is warm and dry. Neurological:      General: No focal deficit present. Mental Status: She is alert and oriented to person, place, and time. Mental status is at baseline. Psychiatric:         Mood and Affect: Mood normal.         Behavior: Behavior normal.         Thought Content:  Thought content normal.         Judgment: Judgment normal.       Phoebe Toledo

## 2023-08-24 NOTE — ASSESSMENT & PLAN NOTE
PHQ-2/9 Depression Screening    Little interest or pleasure in doing things: 2 - more than half the days  Feeling down, depressed, or hopeless: 3 - nearly every day  Trouble falling or staying asleep, or sleeping too much: 1 - several days  Feeling tired or having little energy: 1 - several days  Poor appetite or overeating: 3 - nearly every day  Feeling bad about yourself - or that you are a failure or have let yourself or your family down: 1 - several days  Trouble concentrating on things, such as reading the newspaper or watching television: 1 - several days  Moving or speaking so slowly that other people could have noticed. Or the opposite - being so fidgety or restless that you have been moving around a lot more than usual: 0 - not at all  Thoughts that you would be better off dead, or of hurting yourself in some way: 0 - not at all  PHQ-2 Score: 5  PHQ-2 Interpretation: POSITIVE depression screen  PHQ-9 Score: 12   PHQ-9 Interpretation: Moderate depression      - Reviewed lifestyle management such as exercise, meditation, drinking 64 oz of water daily, and following a healthy diet. - Start Lexapro 10 mg daily. Discussed expected outcomes and possible side effects.    Provided patient with list of local mental health resources

## 2023-08-24 NOTE — PATIENT INSTRUCTIONS
[phone]    Outpatient Mental Health Resources     [place] Suicide Prevention Lifeline: Dial #437  If you prefer to text, you can reach the Crisis Text Line by texting “PA” to 340562    ¿Te encuentras en crisis? Envía un mensaje de texto con la Delstaciee Yadiel al [phone] para comunicarte de manera gratuita con un Consejero de Crisis  Apoyo gratuito las 24 horas del día, los 7 bryn de la [place], al alcance de tu manoJersey Zhong for mental health emergencies and/or please go to your local Emergency Department Call [phone] if you or a loved one are in a mental health crisis. You can Visit https://Portfolium/. pdf to find 24/7 crisis phone line for other counties. ETHOS   ? Location 1: [address] [place] [address] [phone]   ? Location 2: [facility]        ? English only* Serves ages 4+          ? Accepts Medicare and some commercial plans    SOFY   ? [address] [facility], 630 [facility] [phone]; [phone]  ? Bilingual English/Albanian Serves ages 5+   ? [address]   ? [place] [facility], [address] [phone]   ? Bilingual English/Albanian Serves ages 16+   ? Accepts Medical Assistance, (Not currently accepting Medicare), & Major Commercial Insurances     [address] [place] [address]   ?   [address] [facility], 350 [facility] [phone]        ? Bilingual English/Albanian Serves ages 6+   ? [address]   ? Previous [employer] location is closed  ? [place], [address]   ? Bilingual English/Albanian Serves ages 3+   ? Accepts Medical Assistance & Some Commercial Insurance     OMNI   ? [address] [facility] 800 [facility], [address] [address] [phone]   ? Bilingual English/Albanian Serves ages 5+   ?  [place] [facility]ILY [facility]SW[facility]   ? [address] [facility] [address], 630 [facility] [phone]  ? Bilingual English/Mauritian Serves ages 3+   ? Jose Ramon Parham     PREVENTIVE MEASURES   ? Location 1: [address] [facility], [address] [facility]        ? Location 2: [facility], [address]   ? Bilingual English/Mauritian Serves ages 20+  ? Accepts Medical Assistance    TEEN HELP LINE  ? [phone]    CRIME VICTIMS [place]         ? [phone]       ? 24/7 Advocate Hotline    [place] OF THE Angelito Naranjo         ? [phone]       ? 24/7 Advocate Hotline    www. psychologyFotoshkoladay. EMOSpeech is a resource to find psychotherapy providers, patients can filter therapist search list based on a number of criteria including language, specialty, gender, insurance, etc. Individuals seeking will need to reach out to perspective providers through information in the directory. You are encouraged to contact multiple providers to given that many providers have a significant wait list for services as well as to find a provider is a good fit for you! [address] is an organization of families, friends and individuals whose lives have been affected by mental illness. Together, we advocate for better lives for those individuals who have a m)ental illness. Visit: deejay-lv.org to learn more or to search for local support resources including qualified mental health providers.

## 2023-08-24 NOTE — ASSESSMENT & PLAN NOTE
EKG showed NSR with no acute changes. - Depression with increase in stress levels could definitely be contributing. However, given patients risk factors (female, obesity, hld, htn, prediabetes), would like to rule out cardiac causes.  Check labs as well

## 2023-09-12 DIAGNOSIS — I10 PRIMARY HYPERTENSION: ICD-10-CM

## 2023-09-14 RX ORDER — NIFEDIPINE 30 MG/1
TABLET, FILM COATED, EXTENDED RELEASE ORAL
Qty: 90 TABLET | Refills: 1 | Status: SHIPPED | OUTPATIENT
Start: 2023-09-14 | End: 2023-09-25 | Stop reason: SDUPTHER

## 2023-09-25 ENCOUNTER — OFFICE VISIT (OUTPATIENT)
Dept: FAMILY MEDICINE CLINIC | Facility: CLINIC | Age: 63
End: 2023-09-25

## 2023-09-25 VITALS
HEIGHT: 64 IN | TEMPERATURE: 97.1 F | WEIGHT: 213 LBS | RESPIRATION RATE: 16 BRPM | HEART RATE: 94 BPM | SYSTOLIC BLOOD PRESSURE: 130 MMHG | DIASTOLIC BLOOD PRESSURE: 82 MMHG | OXYGEN SATURATION: 99 % | BODY MASS INDEX: 36.37 KG/M2

## 2023-09-25 DIAGNOSIS — I10 PRIMARY HYPERTENSION: ICD-10-CM

## 2023-09-25 DIAGNOSIS — E78.5 HYPERLIPIDEMIA, UNSPECIFIED HYPERLIPIDEMIA TYPE: ICD-10-CM

## 2023-09-25 DIAGNOSIS — F41.8 DEPRESSION WITH ANXIETY: Primary | ICD-10-CM

## 2023-09-25 PROCEDURE — 99214 OFFICE O/P EST MOD 30 MIN: CPT

## 2023-09-25 RX ORDER — ROSUVASTATIN CALCIUM 20 MG/1
20 TABLET, COATED ORAL DAILY
Qty: 90 TABLET | Refills: 1 | Status: SHIPPED | OUTPATIENT
Start: 2023-09-25

## 2023-09-25 RX ORDER — NIFEDIPINE 30 MG/1
30 TABLET, FILM COATED, EXTENDED RELEASE ORAL DAILY
Qty: 90 TABLET | Refills: 1 | Status: SHIPPED | OUTPATIENT
Start: 2023-09-25

## 2023-09-25 RX ORDER — ESCITALOPRAM OXALATE 10 MG/1
10 TABLET ORAL DAILY
Qty: 90 TABLET | Refills: 1 | Status: SHIPPED | OUTPATIENT
Start: 2023-09-25

## 2023-09-25 NOTE — PROGRESS NOTES
Name: Jose Carlos Clay      : 1960      MRN: [id number]  Encounter Provider: OMAIRA Emmanuel  Encounter Date: 2023   Encounter department: [address] [facility],[address]     1. Depression with anxiety  Assessment & Plan:  PHQ-2/9 Depression Screening    Little interest or pleasure in doing things: 0 - not at all  Feeling down, depressed, or hopeless: 1 - several days  Trouble falling or staying asleep, or sleeping too much: 0 - not at all  Feeling tired or having little energy: 1 - several days  Poor appetite or overeatin - several days  Feeling bad about yourself - or that you are a failure or have let yourself or your family down: 0 - not at all  Trouble concentrating on things, such as reading the newspaper or watching television: 1 - several days  Moving or speaking so slowly that other people could have noticed. Or the opposite - being so fidgety or restless that you have been moving around a lot more than usual: 0 - not at all  Thoughts that you would be better off dead, or of hurting yourself in some way: 0 - not at all  PHQ-9 Score: 4   PHQ-9 Interpretation: No or Minimal depression      significant improvement from previous phq9 score of 14.  - reports compliance with lexapro 10 mg daily. Continue lexapro, recommend talk therapy, continue lifestyle management such as such as exercise, meditation, drinking 64 oz of water daily, and following a healthy diet. Orders:  -     escitalopram (Lexapro) 10 mg tablet; Take 1 tablet (10 mg total) by mouth daily    2. Primary hypertension  Assessment & Plan:  BP Readings from Last 3 Encounters:   23 130/82   23 136/74   23 112/70     - At goal. Continue Nifedipine 30 mg daily    Orders:  -     NIFEdipine ER (ADALAT CC) 30 MG 24 hr tablet; Take 1 tablet (30 mg total) by mouth daily    3.  Hyperlipidemia, unspecified hyperlipidemia type  Assessment & Plan:  Lab Results Component Value Date    CHOLESTEROL 186 2022    CHOLESTEROL 181 2022    CHOLESTEROL 292 (H) 2021     Lab Results   Component Value Date    HDL 66 2022    HDL 73 2022    HDL 72 2021     Lab Results   Component Value Date    TRIG 57 2022    TRIG 86 2022    TRIG 130 2021     Lab Results   Component Value Date    NONHDLC 108 2022    [address] [facility] 220 2021     Lab Results   Component Value Date    LDLCALC 109 2022     - Continue crestor 20 mg daily    Orders:  -     rosuvastatin (CRESTOR) 20 MG tablet; Take 1 tablet (20 mg total) by mouth daily    BMI Counseling: There is no height or weight on file to calculate BMI. The BMI is above normal. Nutrition recommendations include decreasing portion sizes, encouraging healthy choices of fruits and vegetables, decreasing fast food intake, consuming healthier snacks, limiting drinks that contain sugar, moderation in carbohydrate intake, increasing intake of lean protein, reducing intake of saturated and trans fat and reducing intake of cholesterol. Exercise recommendations include moderate physical activity 150 minutes/week. No pharmacotherapy was ordered. Rationale for BMI follow-up plan is due to patient being overweight or obese. Hannah Griffith is a 65 y.o. female  has a past medical history of Depression, Peptic ulcer, Temporary cerebral vascular dysfunction, and Transient ischemic attack. has a past surgical history that includes  section ([id number]). Depression  Patient complains of depression. She denies anhedonia, depressed mood, difficulty concentrating, feelings of worthlessness/guilt, hopelessness, hypersomnia, impaired memory, psychomotor agitation, psychomotor retardation, recurrent thoughts of death, suicidal attempt, suicidal thoughts with specific plan, suicidal thoughts without plan, weight gain and weight loss.  Symptoms have been rapidly improving since that time. Family history significant for alcoholism. Possible organic causes contributing are: none. Risk factors: negative life event brother passing. Previous treatment includes medication. She complains of the following side effects from the treatment: none. Review of Systems   Constitutional: Negative for chills and fever. HENT: Negative for ear pain and sore throat. Eyes: Negative for pain and visual disturbance. Respiratory: Negative for cough and shortness of breath. Cardiovascular: Negative for chest pain and palpitations. Gastrointestinal: Negative for abdominal pain and vomiting. Genitourinary: Negative for dysuria and hematuria. Musculoskeletal: Negative for arthralgias and back pain. Skin: Negative for color change and rash. Neurological: Negative for seizures and syncope. All other systems reviewed and are negative.       Current Outpatient Medications on File Prior to Visit   Medication Sig   • Diclofenac Sodium (VOLTAREN) 1 % Apply 2 g topically 4 (four) times a day (Patient not taking: Reported on 5/7/2023)   • fluticasone (FLONASE) 50 mcg/act nasal spray 1 spray into each nostril daily   • Melatonin 500 MCG TBDP Take 1 tablet (500 mcg total) by mouth daily at bedtime (Patient not taking: Reported on 5/7/2023)   • omeprazole (PriLOSEC) 20 mg delayed release capsule TAKE 1 CAPSULE(20 MG) BY MOUTH DAILY   • polyethylene glycol (GLYCOLAX) 17 GM/SCOOP powder Take 17 g by mouth daily   • senna-docusate sodium (SENOKOT S) 8.6-50 mg per tablet Take 1 tablet by mouth daily   • SUMAtriptan (Imitrex) 25 mg tablet Take 1 tablet (25 mg total) by mouth once as needed for migraine   • [DISCONTINUED] escitalopram (Lexapro) 10 mg tablet Take 1 tablet (10 mg total) by mouth daily   • [DISCONTINUED] NIFEdipine ER (ADALAT CC) 30 MG 24 hr tablet TAKE 1 TABLET(30 MG) BY MOUTH DAILY   • [DISCONTINUED] rosuvastatin (CRESTOR) 20 MG tablet TAKE 1 TABLET(20 MG) BY MOUTH DAILY Objective     /82 (BP Location: Right arm, Patient Position: Sitting, Cuff Size: Large)   Pulse 94   Temp (!) 97.1 °F (36.2 °C) (Temporal)   Resp 16   Ht 5' 4" (1.626 m)   Wt 96.6 kg (213 lb)   SpO2 99%   BMI 36.56 kg/m²     Physical Exam  Vitals and nursing note reviewed. Constitutional:       General: She is not in acute distress. Appearance: She is obese. She is not ill-appearing, toxic-appearing or diaphoretic. HENT:      Head: Normocephalic and atraumatic. Right Ear: External ear normal.      Left Ear: External ear normal.      Nose: Nose normal.   Eyes:      Conjunctiva/sclera: Conjunctivae normal.   Cardiovascular:      Rate and Rhythm: Normal rate. Pulmonary:      Effort: Pulmonary effort is normal.   Musculoskeletal:         General: Normal range of motion. Cervical back: Normal range of motion and neck supple. Skin:     General: Skin is warm and dry. Capillary Refill: Capillary refill takes less than 2 seconds. Neurological:      General: No focal deficit present. Mental Status: She is alert and oriented to person, place, and time. Mental status is at baseline. Psychiatric:         Mood and Affect: Mood normal.         Behavior: Behavior normal.         Thought Content:  Thought content normal.         Judgment: Judgment normal.       Varsha Gutierrez

## 2023-09-25 NOTE — ASSESSMENT & PLAN NOTE
Lab Results   Component Value Date    CHOLESTEROL 186 11/08/2022    CHOLESTEROL 181 01/26/2022    CHOLESTEROL 292 (H) 05/22/2021     Lab Results   Component Value Date    HDL 66 11/08/2022    HDL 73 01/26/2022    HDL 72 05/22/2021     Lab Results   Component Value Date    TRIG 57 11/08/2022    TRIG 86 01/26/2022    TRIG 130 05/22/2021     Lab Results   Component Value Date    NONHDLC 108 01/26/2022    [address] [facility] 220 05/22/2021     Lab Results   Component Value Date    LDLCALC 109 11/08/2022     - Continue crestor 20 mg daily

## 2023-09-25 NOTE — ASSESSMENT & PLAN NOTE
BP Readings from Last 3 Encounters:   09/25/23 130/82   08/24/23 136/74   06/13/23 112/70     - At goal. Continue Nifedipine 30 mg daily

## 2023-10-10 ENCOUNTER — OFFICE VISIT (OUTPATIENT)
Dept: FAMILY MEDICINE CLINIC | Facility: CLINIC | Age: 63
End: 2023-10-10

## 2023-10-10 VITALS
SYSTOLIC BLOOD PRESSURE: 140 MMHG | HEART RATE: 98 BPM | DIASTOLIC BLOOD PRESSURE: 90 MMHG | OXYGEN SATURATION: 99 % | RESPIRATION RATE: 16 BRPM | WEIGHT: 212 LBS | HEIGHT: 64 IN | BODY MASS INDEX: 36.19 KG/M2 | TEMPERATURE: 99 F

## 2023-10-10 DIAGNOSIS — J06.9 UPPER RESPIRATORY INFECTION, VIRAL: Primary | ICD-10-CM

## 2023-10-10 DIAGNOSIS — J30.1 SEASONAL ALLERGIC RHINITIS DUE TO POLLEN: ICD-10-CM

## 2023-10-10 DIAGNOSIS — G43.109 MIGRAINE WITH AURA AND WITHOUT STATUS MIGRAINOSUS, NOT INTRACTABLE: ICD-10-CM

## 2023-10-10 PROCEDURE — 99213 OFFICE O/P EST LOW 20 MIN: CPT

## 2023-10-10 PROCEDURE — 87636 SARSCOV2 & INF A&B AMP PRB: CPT

## 2023-10-10 RX ORDER — FLUTICASONE PROPIONATE 50 MCG
1 SPRAY, SUSPENSION (ML) NASAL DAILY
Qty: 16 G | Refills: 3 | Status: SHIPPED | OUTPATIENT
Start: 2023-10-10

## 2023-10-10 RX ORDER — SUMATRIPTAN 25 MG/1
25 TABLET, FILM COATED ORAL ONCE AS NEEDED
Qty: 9 TABLET | Refills: 0 | Status: SHIPPED | OUTPATIENT
Start: 2023-10-10

## 2023-10-10 NOTE — PROGRESS NOTES
Name: Dave Griffith      : 1960      MRN: [id number]  Encounter Provider: OMAIRA Glez  Encounter Date: 10/10/2023   Encounter department: [facility],[address]     1. Upper respiratory infection, viral  Assessment & Plan:  - Discussed supportive treatment and infection prevention  -take tylenol PRN. If ineffective, take naproxen PRN  -Recommend using flonase daily and Nasal irrigation  -Encourage fluid intake and adequate rest.   -Return to office if symptoms persist/worsen. -will R/O covid/flu        Orders:  -     Covid/Flu- Office Collect  -     fluticasone (FLONASE) 50 mcg/act nasal spray; 1 spray into each nostril daily    2. Seasonal allergic rhinitis due to pollen  -     fluticasone (FLONASE) 50 mcg/act nasal spray; 1 spray into each nostril daily    3. Migraine with aura and without status migrainosus, not intractable  -     SUMAtriptan (Imitrex) 25 mg tablet; Take 1 tablet (25 mg total) by mouth once as needed for migraine         Subjective      Dave Griffith is a 66 yo female who is here for same day. Upper Respiratory Infection  Patient complains of symptoms of a URI. Symptoms include congestion, cough described as productive of white thick sputum, subjective fever for 4 days. Patient then started having headache described as pulsating pain on her right temporalis with nausea without vomiting x 1 day. Treatment to date: tylenol and imitrex which is mildly effective. Reports sick contact with granddaughter who has milar symptoms. Denies SOB, abdominal pain, and  problems. Review of Systems   Constitutional: Positive for fatigue and fever. Negative for chills. HENT: Positive for congestion and sore throat. Negative for ear pain. Eyes: Negative. Negative for pain and visual disturbance. Respiratory: Positive for cough. Negative for shortness of breath. Cardiovascular: Negative.   Negative for chest pain and palpitations. Gastrointestinal: Negative. Negative for abdominal pain and vomiting. Endocrine: Negative. Genitourinary: Negative. Negative for dysuria and hematuria. Musculoskeletal: Negative. Negative for arthralgias and back pain. Skin: Negative. Negative for color change and rash. Allergic/Immunologic: Negative. Neurological: Negative. Negative for seizures and syncope. Hematological: Negative. Psychiatric/Behavioral: Negative. All other systems reviewed and are negative. Current Outpatient Medications on File Prior to Visit   Medication Sig   • escitalopram (Lexapro) 10 mg tablet Take 1 tablet (10 mg total) by mouth daily   • NIFEdipine ER (ADALAT CC) 30 MG 24 hr tablet Take 1 tablet (30 mg total) by mouth daily   • omeprazole (PriLOSEC) 20 mg delayed release capsule TAKE 1 CAPSULE(20 MG) BY MOUTH DAILY   • polyethylene glycol (GLYCOLAX) 17 GM/SCOOP powder Take 17 g by mouth daily   • rosuvastatin (CRESTOR) 20 MG tablet Take 1 tablet (20 mg total) by mouth daily   • [DISCONTINUED] Diclofenac Sodium (VOLTAREN) 1 % Apply 2 g topically 4 (four) times a day (Patient not taking: Reported on 5/7/2023)   • [DISCONTINUED] fluticasone (FLONASE) 50 mcg/act nasal spray 1 spray into each nostril daily   • [DISCONTINUED] Melatonin 500 MCG TBDP Take 1 tablet (500 mcg total) by mouth daily at bedtime (Patient not taking: Reported on 5/7/2023)   • [DISCONTINUED] senna-docusate sodium (SENOKOT S) 8.6-50 mg per tablet Take 1 tablet by mouth daily   • [DISCONTINUED] SUMAtriptan (Imitrex) 25 mg tablet Take 1 tablet (25 mg total) by mouth once as needed for migraine       Objective     /90 (BP Location: Right arm, Patient Position: Sitting, Cuff Size: Large)   Pulse 98   Temp 99 °F (37.2 °C) (Temporal)   Resp 16   Ht 5' 4" (1.626 m)   Wt 96.2 kg (212 lb)   SpO2 99%   BMI 36.39 kg/m²     Physical Exam  Vitals reviewed.    Constitutional:       Appearance: Normal appearance. She is normal weight. She is ill-appearing. HENT:      Head: Normocephalic and atraumatic. Right Ear: Ear canal and external ear normal. Tympanic membrane is erythematous. Left Ear: Ear canal and external ear normal. Tympanic membrane is erythematous. Nose: Congestion present. Mouth/Throat:      Mouth: Mucous membranes are moist.   Eyes:      General:         Right eye: No discharge. Left eye: No discharge. Cardiovascular:      Rate and Rhythm: Normal rate and regular rhythm. Pulses: Normal pulses. Heart sounds: Normal heart sounds. Pulmonary:      Effort: Pulmonary effort is normal.      Breath sounds: Normal breath sounds. Abdominal:      General: Abdomen is flat. Bowel sounds are normal.      Palpations: Abdomen is soft. Musculoskeletal:         General: Normal range of motion. Cervical back: Normal range of motion. Right lower leg: No edema. Left lower leg: No edema. Skin:     General: Skin is warm and dry. Capillary Refill: Capillary refill takes less than 2 seconds. Neurological:      General: No focal deficit present. Mental Status: She is alert and oriented to person, place, and time. Psychiatric:         Mood and Affect: Mood normal.         Behavior: Behavior normal.         Thought Content:  Thought content normal.         Judgment: Judgment normal.       Raphael Encinas

## 2023-10-10 NOTE — ASSESSMENT & PLAN NOTE
- Discussed supportive treatment and infection prevention  -take tylenol PRN. If ineffective, take naproxen PRN  -Recommend using flonase daily and Nasal irrigation  -Encourage fluid intake and adequate rest.   -Return to office if symptoms persist/worsen.    -will R/O covid/flu

## 2023-10-10 NOTE — LETTER
October 10, 2023     Patient: Masood Gonzalez  YOB: 1960  Date of Visit: 10/10/2023      To Whom it May Concern:    Masood Gonzalez is under my professional care. Praneeth Simon was seen in my office on 10/10/2023. Praneeth Simon may return to work on 10/16/23 . If you have any questions or concerns, please don't hesitate to call.          Sincerely,          OMAIRA Diggs        CC: No Recipients

## 2023-10-11 LAB
FLUAV RNA RESP QL NAA+PROBE: POSITIVE
FLUBV RNA RESP QL NAA+PROBE: NEGATIVE
SARS-COV-2 RNA RESP QL NAA+PROBE: NEGATIVE

## 2023-12-09 PROBLEM — J06.9 UPPER RESPIRATORY INFECTION, VIRAL: Status: RESOLVED | Noted: 2019-11-11 | Resolved: 2023-12-09

## 2024-02-23 ENCOUNTER — OFFICE VISIT (OUTPATIENT)
Dept: FAMILY MEDICINE CLINIC | Facility: CLINIC | Age: 64
End: 2024-02-23

## 2024-02-23 VITALS
BODY MASS INDEX: 39.98 KG/M2 | DIASTOLIC BLOOD PRESSURE: 72 MMHG | OXYGEN SATURATION: 97 % | HEART RATE: 96 BPM | HEIGHT: 62 IN | SYSTOLIC BLOOD PRESSURE: 130 MMHG | TEMPERATURE: 98.7 F | WEIGHT: 217.25 LBS | RESPIRATION RATE: 18 BRPM

## 2024-02-23 DIAGNOSIS — R00.2 PALPITATIONS: Primary | ICD-10-CM

## 2024-02-23 LAB — ECG INTERP DURING EX: ABNORMAL MS

## 2024-02-23 NOTE — PROGRESS NOTES
Name: Mariana Swenson      : 1960      MRN: [id number]  Encounter Provider: Johanna Cordero MD  Encounter Date: 2024   Encounter department: [facility]    Assessment & Plan     1. Palpitations  Assessment & Plan:  3 self resolving episodes within 2 weeks  No alarming symptoms  No acute finding on POCT ECG; to be confirmed by Cardiology      Plan:  Encouraged to get stress test and Echo done which was ordered by her PCP   Holter 48 h   CMP, CBC, and TSH  ED precautions  Follow up with PCP on 3/25/24    Orders:  -     POCT ECG  -     Comprehensive metabolic panel; Future  -     Lipid panel; Future  -     CBC and differential; Future  -     TSH w/Reflex; Future  -     Holter monitor; Future; Expected date: 2024           Subjective      This is a 63 y.o female with PMH of migraine, hypertension, hyperlipidemia, constipation, GERD who presents today for evaluation of palpitations.    Palpitations  This is a new problem. Episode onset: 2 weeks. The problem occurs intermittently (3 epidodes; lasting about 10 minutes). Associated symptoms include diaphoresis. Pertinent negatives include no chest pain, fever, headaches, nausea or vertigo. Nothing aggravates the symptoms. She has tried nothing for the symptoms.     Review of Systems   Constitutional:  Positive for diaphoresis. Negative for fever.   Cardiovascular:  Negative for chest pain.   Gastrointestinal:  Negative for nausea.   Neurological:  Negative for vertigo and headaches.       Current Outpatient Medications on File Prior to Visit   Medication Sig    escitalopram (Lexapro) 10 mg tablet Take 1 tablet (10 mg total) by mouth daily    fluticasone (FLONASE) 50 mcg/act nasal spray 1 spray into each nostril daily    NIFEdipine ER (ADALAT CC) 30 MG 24 hr tablet Take 1 tablet (30 mg total) by mouth daily    omeprazole (PriLOSEC) 20 mg delayed release capsule TAKE 1 CAPSULE(20 MG) BY MOUTH DAILY     "polyethylene glycol (GLYCOLAX) 17 GM/SCOOP powder Take 17 g by mouth daily    rosuvastatin (CRESTOR) 20 MG tablet Take 1 tablet (20 mg total) by mouth daily    SUMAtriptan (Imitrex) 25 mg tablet Take 1 tablet (25 mg total) by mouth once as needed for migraine       Objective     /72 (BP Location: Left arm, Patient Position: Sitting, Cuff Size: Standard)   Pulse 96   Temp 98.7 °F (37.1 °C) (Temporal)   Resp 18   Ht 5' 2\" (1.575 m)   Wt 98.5 kg (217 lb 4 oz)   SpO2 97%   BMI 39.74 kg/m²     Physical Exam  Constitutional:       General: She is not in acute distress.     Appearance: Normal appearance. She is not ill-appearing or toxic-appearing.   HENT:      Head: Normocephalic and atraumatic.      Nose: Nose normal. No congestion or rhinorrhea.   Eyes:      General: No scleral icterus.     Conjunctiva/sclera: Conjunctivae normal.   Cardiovascular:      Rate and Rhythm: Normal rate and regular rhythm.      Pulses: Normal pulses.      Heart sounds: Normal heart sounds. No murmur heard.     No gallop.   Pulmonary:      Effort: Pulmonary effort is normal. No respiratory distress.      Breath sounds: Normal breath sounds. No wheezing or rales.   Musculoskeletal:      Cervical back: Normal range of motion and neck supple.      Right lower leg: No edema.      Left lower leg: No edema.   Skin:     General: Skin is warm and dry.      Capillary Refill: Capillary refill takes less than 2 seconds.      Coloration: Skin is not jaundiced or pale.   Neurological:      General: No focal deficit present.      Mental Status: She is alert and oriented to person, place, and time.   Psychiatric:         Mood and Affect: Mood normal.         Behavior: Behavior normal.       Johanna Cordero MD    "

## 2024-02-23 NOTE — ASSESSMENT & PLAN NOTE
3 self resolving episodes within 2 weeks  No alarming symptoms  No acute finding on POCT ECG; to be confirmed by Cardiology      Plan:  Encouraged to get stress test and Echo done which was ordered by her PCP   Holter 48 h   CMP, CBC, and TSH  ED precautions  Follow up with PCP on 3/25/24

## 2024-02-28 ENCOUNTER — OFFICE VISIT (OUTPATIENT)
Dept: FAMILY MEDICINE CLINIC | Facility: CLINIC | Age: 64
End: 2024-02-28

## 2024-02-28 VITALS
TEMPERATURE: 98.3 F | BODY MASS INDEX: 39.2 KG/M2 | HEART RATE: 102 BPM | OXYGEN SATURATION: 98 % | RESPIRATION RATE: 18 BRPM | SYSTOLIC BLOOD PRESSURE: 118 MMHG | HEIGHT: 62 IN | DIASTOLIC BLOOD PRESSURE: 84 MMHG | WEIGHT: 213 LBS

## 2024-02-28 DIAGNOSIS — U07.1 COVID-19: ICD-10-CM

## 2024-02-28 DIAGNOSIS — J06.9 VIRAL UPPER RESPIRATORY TRACT INFECTION: Primary | ICD-10-CM

## 2024-02-28 LAB
SARS-COV-2 AG UPPER RESP QL IA: POSITIVE
SL AMB POCT RAPID FLU A: NEGATIVE
SL AMB POCT RAPID FLU B: NEGATIVE
VALID CONTROL: ABNORMAL

## 2024-02-28 PROCEDURE — 87811 SARS-COV-2 COVID19 W/OPTIC: CPT | Performed by: FAMILY MEDICINE

## 2024-02-28 PROCEDURE — 99213 OFFICE O/P EST LOW 20 MIN: CPT | Performed by: FAMILY MEDICINE

## 2024-02-28 PROCEDURE — 87804 INFLUENZA ASSAY W/OPTIC: CPT | Performed by: FAMILY MEDICINE

## 2024-02-28 NOTE — PROGRESS NOTES
Name: Mariana Swenson      : 1960      MRN: [id number]  Encounter Provider: Johanna Cordero MD  Encounter Date: 2024   Encounter department: [facility]    Assessment & Plan     1. Viral upper respiratory tract infection  -     Covid/Flu- Office Collect Normal    2. COVID-19  Assessment & Plan:  Positive rapid test today. Symptoms onset 5 days ago  Mild symptoms, now improving. At this time patient does not qualify for Paxlovid treatment due to time of onset    Plan:  Supportive care at home with oral hydration, Tylenol and well balanced diet  ED precautions given              Subjective      This is a 63 y.o female who presents today for evaluation of congestion and headache for the past 5 days.     URI   This is a new problem. The current episode started in the past 7 days. The problem has been gradually improving. Maximum temperature: subjective fever. Associated symptoms include congestion, coughing, headaches and rhinorrhea. Pertinent negatives include no diarrhea, sore throat or vomiting. She has tried acetaminophen for the symptoms. The treatment provided mild relief.     Review of Systems   HENT:  Positive for congestion and rhinorrhea. Negative for sore throat.    Respiratory:  Positive for cough.    Gastrointestinal:  Negative for diarrhea and vomiting.   Neurological:  Positive for headaches.       Current Outpatient Medications on File Prior to Visit   Medication Sig    escitalopram (Lexapro) 10 mg tablet Take 1 tablet (10 mg total) by mouth daily    fluticasone (FLONASE) 50 mcg/act nasal spray 1 spray into each nostril daily    NIFEdipine ER (ADALAT CC) 30 MG 24 hr tablet Take 1 tablet (30 mg total) by mouth daily    omeprazole (PriLOSEC) 20 mg delayed release capsule TAKE 1 CAPSULE(20 MG) BY MOUTH DAILY    polyethylene glycol (GLYCOLAX) 17 GM/SCOOP powder Take 17 g by mouth daily    rosuvastatin (CRESTOR) 20 MG tablet Take 1 tablet (20 mg total)  Procedure and recovery completed. Dr. Bishop discussed results with patient using Video camera Martii . Printed report given to patient. Report called to MANOJ Grover. Patient ready to transport to room   "by mouth daily    SUMAtriptan (Imitrex) 25 mg tablet Take 1 tablet (25 mg total) by mouth once as needed for migraine       Objective     /84 (BP Location: Left arm, Patient Position: Sitting, Cuff Size: Large)   Pulse 102   Temp 98.3 °F (36.8 °C) (Temporal)   Resp 18   Ht 5' 2\" (1.575 m)   Wt 96.6 kg (213 lb)   SpO2 98%   BMI 38.96 kg/m²     Physical Exam  Constitutional:       General: She is not in acute distress.     Appearance: Normal appearance. She is not toxic-appearing.   HENT:      Head: Normocephalic and atraumatic.      Right Ear: Tympanic membrane and external ear normal.      Left Ear: Tympanic membrane and external ear normal.      Nose: Rhinorrhea present. No congestion.      Mouth/Throat:      Mouth: Mucous membranes are moist.      Pharynx: Oropharynx is clear. Posterior oropharyngeal erythema present. No oropharyngeal exudate.   Eyes:      General: No scleral icterus.     Conjunctiva/sclera: Conjunctivae normal.   Cardiovascular:      Rate and Rhythm: Regular rhythm.      Pulses: Normal pulses.      Heart sounds: Normal heart sounds. No murmur heard.     No gallop.   Pulmonary:      Effort: Pulmonary effort is normal. No respiratory distress.      Breath sounds: Normal breath sounds. No wheezing or rales.   Musculoskeletal:      Cervical back: Normal range of motion and neck supple.   Skin:     General: Skin is warm and dry.      Capillary Refill: Capillary refill takes less than 2 seconds.      Coloration: Skin is not jaundiced or pale.   Neurological:      General: No focal deficit present.      Mental Status: She is alert and oriented to person, place, and time.   Psychiatric:         Mood and Affect: Mood normal.         Behavior: Behavior normal.       Johanna Cordero MD    "

## 2024-02-28 NOTE — LETTER
February 28, 2024     Patient: Mariana Swenson  YOB: 1960  Date of Visit: 2/28/2024      To Whom it May Concern:    Mariana Swenson is under my professional care. Mariana was seen in my office on 2/28/2024. She has been sick since 2/24/24  Mariana may return to work on 3/4/2024 .    If you have any questions or concerns, please don't hesitate to call.         Sincerely,          Johanna Cordero MD        CC: No Recipients

## 2024-02-28 NOTE — ASSESSMENT & PLAN NOTE
Positive rapid test today. Symptoms onset 5 days ago  Mild symptoms, now improving. At this time patient does not qualify for Paxlovid treatment due to time of onset    Plan:  Supportive care at home with oral hydration, Tylenol and well balanced diet  ED precautions given

## 2024-03-16 ENCOUNTER — APPOINTMENT (OUTPATIENT)
Dept: LAB | Facility: HOSPITAL | Age: 64
End: 2024-03-16
Payer: COMMERCIAL

## 2024-03-16 DIAGNOSIS — R00.2 PALPITATIONS: ICD-10-CM

## 2024-03-16 LAB
ALBUMIN SERPL BCP-MCNC: 4.2 G/DL (ref 3.5–5)
ALP SERPL-CCNC: 40 U/L (ref 34–104)
ALT SERPL W P-5'-P-CCNC: 13 U/L (ref 7–52)
ANION GAP SERPL CALCULATED.3IONS-SCNC: 7 MMOL/L (ref 4–13)
AST SERPL W P-5'-P-CCNC: 16 U/L (ref 13–39)
BASOPHILS # BLD AUTO: 0.04 THOUSANDS/ÂΜL (ref 0–0.1)
BASOPHILS NFR BLD AUTO: 1 % (ref 0–1)
BILIRUB SERPL-MCNC: 0.43 MG/DL (ref 0.2–1)
BUN SERPL-MCNC: 14 MG/DL (ref 5–25)
CALCIUM SERPL-MCNC: 9.5 MG/DL (ref 8.4–10.2)
CHLORIDE SERPL-SCNC: 107 MMOL/L (ref 96–108)
CHOLEST SERPL-MCNC: 161 MG/DL
CO2 SERPL-SCNC: 28 MMOL/L (ref 21–32)
CREAT SERPL-MCNC: 0.85 MG/DL (ref 0.6–1.3)
EOSINOPHIL # BLD AUTO: 0.09 THOUSAND/ÂΜL (ref 0–0.61)
EOSINOPHIL NFR BLD AUTO: 2 % (ref 0–6)
ERYTHROCYTE [DISTWIDTH] IN BLOOD BY AUTOMATED COUNT: 13.5 % (ref 11.6–15.1)
GFR SERPL CREATININE-BSD FRML MDRD: 73 ML/MIN/1.73SQ M
GLUCOSE P FAST SERPL-MCNC: 90 MG/DL (ref 65–99)
HCT VFR BLD AUTO: 41.9 % (ref 34.8–46.1)
HDLC SERPL-MCNC: 72 MG/DL
HGB BLD-MCNC: 13.6 G/DL (ref 11.5–15.4)
IMM GRANULOCYTES # BLD AUTO: 0.01 THOUSAND/UL (ref 0–0.2)
IMM GRANULOCYTES NFR BLD AUTO: 0 % (ref 0–2)
LDLC SERPL CALC-MCNC: 80 MG/DL (ref 0–100)
LYMPHOCYTES # BLD AUTO: 2.12 THOUSANDS/ÂΜL (ref 0.6–4.47)
LYMPHOCYTES NFR BLD AUTO: 45 % (ref 14–44)
MCH RBC QN AUTO: 28.2 PG (ref 26.8–34.3)
MCHC RBC AUTO-ENTMCNC: 32.5 G/DL (ref 31.4–37.4)
MCV RBC AUTO: 87 FL (ref 82–98)
MONOCYTES # BLD AUTO: 0.44 THOUSAND/ÂΜL (ref 0.17–1.22)
MONOCYTES NFR BLD AUTO: 9 % (ref 4–12)
NEUTROPHILS # BLD AUTO: 2.02 THOUSANDS/ÂΜL (ref 1.85–7.62)
NEUTS SEG NFR BLD AUTO: 43 % (ref 43–75)
NONHDLC SERPL-MCNC: 89 MG/DL
NRBC BLD AUTO-RTO: 0 /100 WBCS
PLATELET # BLD AUTO: 247 THOUSANDS/UL (ref 149–390)
PMV BLD AUTO: 11.5 FL (ref 8.9–12.7)
POTASSIUM SERPL-SCNC: 4.9 MMOL/L (ref 3.5–5.3)
PROT SERPL-MCNC: 7.3 G/DL (ref 6.4–8.4)
RBC # BLD AUTO: 4.83 MILLION/UL (ref 3.81–5.12)
SODIUM SERPL-SCNC: 142 MMOL/L (ref 135–147)
TRIGL SERPL-MCNC: 45 MG/DL
TSH SERPL DL<=0.05 MIU/L-ACNC: 1.21 UIU/ML (ref 0.45–4.5)
WBC # BLD AUTO: 4.72 THOUSAND/UL (ref 4.31–10.16)

## 2024-03-16 PROCEDURE — 85025 COMPLETE CBC W/AUTO DIFF WBC: CPT

## 2024-03-16 PROCEDURE — 80053 COMPREHEN METABOLIC PANEL: CPT

## 2024-03-16 PROCEDURE — 36415 COLL VENOUS BLD VENIPUNCTURE: CPT

## 2024-03-16 PROCEDURE — 84443 ASSAY THYROID STIM HORMONE: CPT

## 2024-03-16 PROCEDURE — 80061 LIPID PANEL: CPT

## 2024-03-25 ENCOUNTER — OFFICE VISIT (OUTPATIENT)
Dept: FAMILY MEDICINE CLINIC | Facility: CLINIC | Age: 64
End: 2024-03-25

## 2024-03-25 VITALS
DIASTOLIC BLOOD PRESSURE: 70 MMHG | HEART RATE: 88 BPM | BODY MASS INDEX: 40.67 KG/M2 | HEIGHT: 62 IN | OXYGEN SATURATION: 99 % | RESPIRATION RATE: 16 BRPM | TEMPERATURE: 98 F | SYSTOLIC BLOOD PRESSURE: 122 MMHG | WEIGHT: 221 LBS

## 2024-03-25 DIAGNOSIS — E78.5 HYPERLIPIDEMIA, UNSPECIFIED HYPERLIPIDEMIA TYPE: ICD-10-CM

## 2024-03-25 DIAGNOSIS — J30.1 SEASONAL ALLERGIC RHINITIS DUE TO POLLEN: ICD-10-CM

## 2024-03-25 DIAGNOSIS — F41.8 DEPRESSION WITH ANXIETY: ICD-10-CM

## 2024-03-25 DIAGNOSIS — G43.109 MIGRAINE WITH AURA AND WITHOUT STATUS MIGRAINOSUS, NOT INTRACTABLE: ICD-10-CM

## 2024-03-25 DIAGNOSIS — Z12.31 ENCOUNTER FOR SCREENING MAMMOGRAM FOR BREAST CANCER: ICD-10-CM

## 2024-03-25 DIAGNOSIS — E66.01 MORBID OBESITY (HCC): ICD-10-CM

## 2024-03-25 DIAGNOSIS — Z23 ENCOUNTER FOR IMMUNIZATION: ICD-10-CM

## 2024-03-25 DIAGNOSIS — Z12.11 COLON CANCER SCREENING: ICD-10-CM

## 2024-03-25 DIAGNOSIS — I10 PRIMARY HYPERTENSION: Primary | ICD-10-CM

## 2024-03-25 DIAGNOSIS — K21.9 GASTROESOPHAGEAL REFLUX DISEASE WITHOUT ESOPHAGITIS: ICD-10-CM

## 2024-03-25 PROBLEM — R20.0 BILATERAL FINGER NUMBNESS: Status: RESOLVED | Noted: 2022-07-20 | Resolved: 2024-03-25

## 2024-03-25 PROBLEM — U07.1 COVID-19: Status: RESOLVED | Noted: 2024-02-28 | Resolved: 2024-03-25

## 2024-03-25 PROBLEM — M25.561 ACUTE PAIN OF RIGHT KNEE: Status: RESOLVED | Noted: 2023-06-13 | Resolved: 2024-03-25

## 2024-03-25 PROBLEM — K08.89 PAIN, DENTAL: Status: RESOLVED | Noted: 2023-06-13 | Resolved: 2024-03-25

## 2024-03-25 PROCEDURE — 90471 IMMUNIZATION ADMIN: CPT

## 2024-03-25 PROCEDURE — 99214 OFFICE O/P EST MOD 30 MIN: CPT

## 2024-03-25 PROCEDURE — 90750 HZV VACC RECOMBINANT IM: CPT

## 2024-03-25 RX ORDER — ROSUVASTATIN CALCIUM 20 MG/1
20 TABLET, COATED ORAL DAILY
Qty: 90 TABLET | Refills: 1 | Status: SHIPPED | OUTPATIENT
Start: 2024-03-25

## 2024-03-25 RX ORDER — NIFEDIPINE 30 MG
30 TABLET, EXTENDED RELEASE ORAL DAILY
Qty: 90 TABLET | Refills: 1 | Status: SHIPPED | OUTPATIENT
Start: 2024-03-25

## 2024-03-25 NOTE — ASSESSMENT & PLAN NOTE
Lab Results   Component Value Date    CHOLESTEROL 161 03/16/2024    CHOLESTEROL 186 11/08/2022    CHOLESTEROL 181 01/26/2022     Lab Results   Component Value Date    HDL 72 03/16/2024    HDL 66 11/08/2022    HDL 73 01/26/2022     Lab Results   Component Value Date    TRIG 45 03/16/2024    TRIG 57 11/08/2022    TRIG 86 01/26/2022     Lab Results   Component Value Date    NONHDLC 89 03/16/2024    NONHDLC 108 01/26/2022    NONHDLC 220 05/22/2021     Lab Results   Component Value Date    LDLCALC 80 03/16/2024     - Continue Crestor 20 mg daily.

## 2024-03-25 NOTE — PROGRESS NOTES
Name: Mariana Swenson      : 1960      MRN: [id number]  Encounter Provider: OMAIRA Alicia  Encounter Date: 3/25/2024   Encounter department: [facility]    Assessment & Plan     1. Primary hypertension  Assessment & Plan:  BP Readings from Last 3 Encounters:   24 122/70   24 118/84   24 130/72     - At goal. Continue Nifedipine 30 mg daily     Orders:  -     NIFEdipine ER (ADALAT CC) 30 MG 24 hr tablet; Take 1 tablet (30 mg total) by mouth daily    2. Encounter for screening mammogram for breast cancer  -     Mammo screening bilateral w 3d & cad; Future; Expected date: 2024    3. Colon cancer screening  -     Ambulatory Referral to Gastroenterology; Future    4. Encounter for immunization  -     Zoster Vaccine Recombinant IM    5. Hyperlipidemia, unspecified hyperlipidemia type  Assessment & Plan:  Lab Results   Component Value Date    CHOLESTEROL 161 2024    CHOLESTEROL 186 2022    CHOLESTEROL 181 2022     Lab Results   Component Value Date    HDL 72 2024    HDL 66 2022    HDL 73 2022     Lab Results   Component Value Date    TRIG 45 2024    TRIG 57 2022    TRIG 86 2022     Lab Results   Component Value Date    NONHDLC 89 2024    NONHDLC 108 2022    NONHDLC 220 2021     Lab Results   Component Value Date    LDLCALC 80 2024     - Continue Crestor 20 mg daily.    Orders:  -     rosuvastatin (CRESTOR) 20 MG tablet; Take 1 tablet (20 mg total) by mouth daily    6. Morbid obesity (HCC)  Assessment & Plan:  BMI Counseling: Body mass index is 40.42 kg/m². The BMI is above normal. Nutrition recommendations include reducing portion sizes, decreasing overall calorie intake, 3-5 servings of fruits/vegetables daily, reducing fast food intake, consuming healthier snacks, decreasing soda and/or juice intake, moderation in carbohydrate intake, increasing intake of  lean protein, reducing intake of saturated fat and trans fat, and reducing intake of cholesterol. Exercise recommendations include moderate aerobic physical activity for 150 minutes/week.        7. Migraine with aura and without status migrainosus, not intractable  Assessment & Plan:  Currently only has two episodes per month. Takes imitrex prn which hels.       8. Gastroesophageal reflux disease without esophagitis  Assessment & Plan:  Reports reflux only when she takes imitrex. Takes omeprazole which resolves symptom.      9. Depression with anxiety  Assessment & Plan:  PHQ-2/9 Depression Screening    Little interest or pleasure in doing things: 0 - not at all  Feeling down, depressed, or hopeless: 0 - not at all  Trouble falling or staying asleep, or sleeping too much: 1 - several days  Feeling tired or having little energy: 1 - several days  Poor appetite or overeatin - not at all  Feeling bad about yourself - or that you are a failure or have let yourself or your family down: 0 - not at all  Trouble concentrating on things, such as reading the newspaper or watching television: 0 - not at all  Moving or speaking so slowly that other people could have noticed. Or the opposite - being so fidgety or restless that you have been moving around a lot more than usual: 0 - not at all  Thoughts that you would be better off dead, or of hurting yourself in some way: 0 - not at all  PHQ-9 Score: 2  PHQ-9 Interpretation: No or Minimal depression       She has not been on lexapro for many months. Reports she is doing well. Continue to monitor off medication.      10. Seasonal allergic rhinitis due to pollen  Assessment & Plan:  Continue flonase prn. Rare use        BMI Counseling: Body mass index is 40.42 kg/m². The BMI is above normal. Nutrition recommendations include decreasing portion sizes, encouraging healthy choices of fruits and vegetables, decreasing fast food intake, consuming healthier snacks, limiting drinks  that contain sugar, moderation in carbohydrate intake, increasing intake of lean protein, reducing intake of saturated and trans fat and reducing intake of cholesterol. Exercise recommendations include moderate physical activity 150 minutes/week. No pharmacotherapy was ordered. Rationale for BMI follow-up plan is due to patient being overweight or obese.     Depression Screening and Follow-up Plan: Patient was screened for depression during today's encounter. They screened negative with a PHQ-9 score of 2.        Hannah Swenson is a 63 y.o. female  has a past medical history of Depression, Peptic ulcer, Temporary cerebral vascular dysfunction, and Transient ischemic attack.  has a past surgical history that includes  section (,,).    She presents today for a HTN follow-up. Denies any acute complaints.        Review of Systems   Constitutional:  Negative for chills and fever.   HENT:  Negative for ear pain and sore throat.    Eyes:  Negative for pain and visual disturbance.   Respiratory:  Negative for cough and shortness of breath.    Cardiovascular:  Negative for chest pain and palpitations.   Gastrointestinal:  Negative for abdominal pain and vomiting.   Genitourinary:  Negative for dysuria and hematuria.   Musculoskeletal:  Negative for arthralgias and back pain.   Skin:  Negative for color change and rash.   Neurological:  Negative for seizures and syncope.   All other systems reviewed and are negative.      Current Outpatient Medications on File Prior to Visit   Medication Sig    fluticasone (FLONASE) 50 mcg/act nasal spray 1 spray into each nostril daily    omeprazole (PriLOSEC) 20 mg delayed release capsule TAKE 1 CAPSULE(20 MG) BY MOUTH DAILY    polyethylene glycol (GLYCOLAX) 17 GM/SCOOP powder Take 17 g by mouth daily    SUMAtriptan (Imitrex) 25 mg tablet Take 1 tablet (25 mg total) by mouth once as needed for migraine    [DISCONTINUED] escitalopram (Lexapro) 10  "mg tablet Take 1 tablet (10 mg total) by mouth daily    [DISCONTINUED] NIFEdipine ER (ADALAT CC) 30 MG 24 hr tablet Take 1 tablet (30 mg total) by mouth daily    [DISCONTINUED] rosuvastatin (CRESTOR) 20 MG tablet Take 1 tablet (20 mg total) by mouth daily       Objective     /70 (BP Location: Right arm, Patient Position: Sitting, Cuff Size: Large)   Pulse 88   Temp 98 °F (36.7 °C) (Temporal)   Resp 16   Ht 5' 2\" (1.575 m)   Wt 100 kg (221 lb)   SpO2 99%   BMI 40.42 kg/m²     Physical Exam  Vitals and nursing note reviewed.   HENT:      Head: Normocephalic and atraumatic.      Right Ear: External ear normal.      Left Ear: External ear normal.      Nose: Nose normal.   Eyes:      Extraocular Movements: Extraocular movements intact.      Conjunctiva/sclera: Conjunctivae normal.      Pupils: Pupils are equal, round, and reactive to light.   Cardiovascular:      Rate and Rhythm: Normal rate and regular rhythm.      Pulses: Normal pulses.      Heart sounds: Normal heart sounds.   Pulmonary:      Effort: Pulmonary effort is normal.      Breath sounds: Normal breath sounds.   Abdominal:      General: Bowel sounds are normal.      Palpations: Abdomen is soft.      Tenderness: There is no abdominal tenderness.   Musculoskeletal:         General: Normal range of motion.      Cervical back: Normal range of motion.   Skin:     General: Skin is warm and dry.   Neurological:      General: No focal deficit present.      Mental Status: She is alert and oriented to person, place, and time. Mental status is at baseline.   Psychiatric:         Mood and Affect: Mood normal.         Behavior: Behavior normal.         Thought Content: Thought content normal.         Judgment: Judgment normal.       OMAIRA Alicia    "

## 2024-03-25 NOTE — ASSESSMENT & PLAN NOTE
BMI Counseling: Body mass index is 40.42 kg/m². The BMI is above normal. Nutrition recommendations include reducing portion sizes, decreasing overall calorie intake, 3-5 servings of fruits/vegetables daily, reducing fast food intake, consuming healthier snacks, decreasing soda and/or juice intake, moderation in carbohydrate intake, increasing intake of lean protein, reducing intake of saturated fat and trans fat, and reducing intake of cholesterol. Exercise recommendations include moderate aerobic physical activity for 150 minutes/week.

## 2024-03-25 NOTE — ASSESSMENT & PLAN NOTE
PHQ-2/9 Depression Screening    Little interest or pleasure in doing things: 0 - not at all  Feeling down, depressed, or hopeless: 0 - not at all  Trouble falling or staying asleep, or sleeping too much: 1 - several days  Feeling tired or having little energy: 1 - several days  Poor appetite or overeatin - not at all  Feeling bad about yourself - or that you are a failure or have let yourself or your family down: 0 - not at all  Trouble concentrating on things, such as reading the newspaper or watching television: 0 - not at all  Moving or speaking so slowly that other people could have noticed. Or the opposite - being so fidgety or restless that you have been moving around a lot more than usual: 0 - not at all  Thoughts that you would be better off dead, or of hurting yourself in some way: 0 - not at all  PHQ-9 Score: 2  PHQ-9 Interpretation: No or Minimal depression       She has not been on lexapro for many months. Reports she is doing well. Continue to monitor off medication.

## 2024-03-25 NOTE — ASSESSMENT & PLAN NOTE
BP Readings from Last 3 Encounters:   03/25/24 122/70   02/28/24 118/84   02/23/24 130/72     - At goal. Continue Nifedipine 30 mg daily

## 2024-05-17 ENCOUNTER — HOSPITAL ENCOUNTER (OUTPATIENT)
Dept: RADIOLOGY | Facility: HOSPITAL | Age: 64
End: 2024-05-17
Payer: COMMERCIAL

## 2024-05-17 ENCOUNTER — OFFICE VISIT (OUTPATIENT)
Dept: FAMILY MEDICINE CLINIC | Facility: CLINIC | Age: 64
End: 2024-05-17

## 2024-05-17 VITALS
DIASTOLIC BLOOD PRESSURE: 88 MMHG | SYSTOLIC BLOOD PRESSURE: 138 MMHG | TEMPERATURE: 98.7 F | HEART RATE: 71 BPM | OXYGEN SATURATION: 98 % | RESPIRATION RATE: 18 BRPM | HEIGHT: 62 IN | BODY MASS INDEX: 40.42 KG/M2

## 2024-05-17 DIAGNOSIS — R05.1 ACUTE COUGH: ICD-10-CM

## 2024-05-17 DIAGNOSIS — R05.1 ACUTE COUGH: Primary | ICD-10-CM

## 2024-05-17 PROBLEM — R05.9 COUGH: Status: ACTIVE | Noted: 2024-05-17

## 2024-05-17 PROCEDURE — 71046 X-RAY EXAM CHEST 2 VIEWS: CPT

## 2024-05-17 RX ORDER — GUAIFENESIN 200 MG/10ML
200 LIQUID ORAL 3 TIMES DAILY PRN
Qty: 120 ML | Refills: 0 | Status: SHIPPED | OUTPATIENT
Start: 2024-05-17

## 2024-05-17 NOTE — ASSESSMENT & PLAN NOTE
Likely due to bronchitis as patient does not have fever, VS are stable and PE is not suspicious for consolidation    Plan:  Supportive care  Robitussin   Chest X-ray   ED precautions given

## 2024-05-17 NOTE — PROGRESS NOTES
"Ambulatory Visit  Name: Mariana Swenson      : 1960      MRN: [id number]  Encounter Provider: Johanna Cordero MD  Encounter Date: 2024   Encounter department: [facility]    Assessment & Plan   1. Acute cough  Assessment & Plan:  Likely due to bronchitis as patient does not have fever, VS are stable and PE is not suspicious for consolidation    Plan:  Supportive care  Robitussin   Chest X-ray   ED precautions given    Orders:  -     XR chest pa & lateral; Future; Expected date: 2024  -     guaiFENesin (ROBITUSSIN) 100 MG/5ML oral liquid; Take 10 mL (200 mg total) by mouth 3 (three) times a day as needed for cough       History of Present Illness     This is a 63 y.o female who presents today for evaluation of URI and cough present for the past 9 days.     URI   This is a new problem. Episode onset: 9 days. The problem has been gradually improving. There has been no fever. Associated symptoms include congestion, coughing, rhinorrhea and wheezing. Pertinent negatives include no diarrhea or vomiting. Treatments tried: OTC cold medication. The treatment provided mild relief.   Cough  This is a new problem. The current episode started 1 to 4 weeks ago. The problem has been unchanged. The cough is Non-productive. Associated symptoms include rhinorrhea and wheezing. She has tried nothing for the symptoms. The treatment provided mild relief. There is no history of asthma, COPD or pneumonia.       Review of Systems   HENT:  Positive for congestion and rhinorrhea.    Respiratory:  Positive for cough and wheezing.    Gastrointestinal:  Negative for diarrhea and vomiting.       Objective     /88 (BP Location: Left arm, Patient Position: Sitting, Cuff Size: Large)   Pulse 71   Temp 98.7 °F (37.1 °C) (Temporal)   Resp 18   Ht 5' 2\" (1.575 m)   SpO2 98%   Breastfeeding No   BMI 40.42 kg/m²     Physical Exam  Vitals and nursing note reviewed. "   Constitutional:       General: She is not in acute distress.     Appearance: She is well-developed.   HENT:      Head: Normocephalic and atraumatic.      Right Ear: Tympanic membrane normal.      Left Ear: Tympanic membrane normal.      Nose: Congestion and rhinorrhea present.      Mouth/Throat:      Mouth: Mucous membranes are moist.      Pharynx: Posterior oropharyngeal erythema present. No oropharyngeal exudate.   Eyes:      General: No scleral icterus.     Conjunctiva/sclera: Conjunctivae normal.   Cardiovascular:      Rate and Rhythm: Normal rate and regular rhythm.      Pulses: Normal pulses.      Heart sounds: No murmur heard.     No friction rub.   Pulmonary:      Effort: Pulmonary effort is normal. No respiratory distress.      Breath sounds: Normal breath sounds. No wheezing, rhonchi or rales.   Musculoskeletal:      Cervical back: Neck supple.   Skin:     General: Skin is warm and dry.      Capillary Refill: Capillary refill takes less than 2 seconds.   Neurological:      Mental Status: She is alert.   Psychiatric:         Mood and Affect: Mood normal.       Administrative Statements

## 2024-06-16 PROBLEM — R05.9 COUGH: Status: RESOLVED | Noted: 2024-05-17 | Resolved: 2024-06-16

## 2024-06-24 ENCOUNTER — OFFICE VISIT (OUTPATIENT)
Dept: FAMILY MEDICINE CLINIC | Facility: CLINIC | Age: 64
End: 2024-06-24

## 2024-06-24 VITALS
OXYGEN SATURATION: 97 % | TEMPERATURE: 97.7 F | HEART RATE: 88 BPM | RESPIRATION RATE: 18 BRPM | WEIGHT: 219.3 LBS | SYSTOLIC BLOOD PRESSURE: 121 MMHG | DIASTOLIC BLOOD PRESSURE: 81 MMHG | HEIGHT: 62 IN | BODY MASS INDEX: 40.35 KG/M2

## 2024-06-24 DIAGNOSIS — Z02.89 EXAMINATION, PHYSICAL, EMPLOYEE: Primary | ICD-10-CM

## 2024-06-24 DIAGNOSIS — Z11.1 SCREENING-PULMONARY TB: ICD-10-CM

## 2024-06-24 DIAGNOSIS — K21.9 GASTROESOPHAGEAL REFLUX DISEASE WITHOUT ESOPHAGITIS: ICD-10-CM

## 2024-06-24 DIAGNOSIS — Z12.11 COLON CANCER SCREENING: ICD-10-CM

## 2024-06-24 DIAGNOSIS — Z12.31 ENCOUNTER FOR SCREENING MAMMOGRAM FOR BREAST CANCER: ICD-10-CM

## 2024-06-24 DIAGNOSIS — I10 PRIMARY HYPERTENSION: ICD-10-CM

## 2024-06-24 PROCEDURE — 99214 OFFICE O/P EST MOD 30 MIN: CPT

## 2024-06-24 RX ORDER — OMEPRAZOLE 20 MG/1
20 CAPSULE, DELAYED RELEASE ORAL DAILY
Qty: 180 CAPSULE | Refills: 1 | Status: SHIPPED | OUTPATIENT
Start: 2024-06-24

## 2024-06-24 NOTE — PROGRESS NOTES
Ambulatory Visit  Name: Mariana Swenson      : 1960      MRN: [id number]  Encounter Provider: OMAIRA Alicia  Encounter Date: 2024   Encounter department: [facility]    Assessment & Plan   1. Examination, physical, employee  2. Encounter for screening mammogram for breast cancer  -     Mammo screening bilateral w 3d & cad; Future; Expected date: 2024  3. Colon cancer screening  -     Ambulatory Referral to Gastroenterology; Future  4. Gastroesophageal reflux disease without esophagitis  Assessment & Plan:  Stable. Continue omeprazole 20 mg daily  Orders:  -     omeprazole (PriLOSEC) 20 mg delayed release capsule; Take 1 capsule (20 mg total) by mouth daily  5. Screening-pulmonary TB  -     Quantiferon TB Gold Plus Assay; Future  6. Primary hypertension  Assessment & Plan:  BP Readings from Last 3 Encounters:   24 121/81   24 138/88   24 122/70     - At goal. Continue Nifedipine 30 mg daily         History of Present Illness     Mariana Swenson is a 63 y.o. female  has a past medical history of Depression, Peptic ulcer, Temporary cerebral vascular dysfunction, and Transient ischemic attack.  has a past surgical history that includes  section (,,).    She presents today for an employee physical and HTN f/u.        Review of Systems   Constitutional:  Negative for chills and fever.   HENT:  Negative for ear pain and sore throat.    Eyes:  Negative for pain and visual disturbance.   Respiratory:  Negative for cough and shortness of breath.    Cardiovascular:  Negative for chest pain and palpitations.   Gastrointestinal:  Negative for abdominal pain and vomiting.   Genitourinary:  Negative for dysuria and hematuria.   Musculoskeletal:  Negative for arthralgias and back pain.   Skin:  Negative for color change and rash.   Neurological:  Negative for seizures and syncope.   All other systems reviewed and  "are negative.      Objective     /81 (BP Location: Left arm, Patient Position: Sitting, Cuff Size: Standard)   Pulse 88   Temp 97.7 °F (36.5 °C) (Temporal)   Resp 18   Ht 5' 2\" (1.575 m)   Wt 99.5 kg (219 lb 4.8 oz)   SpO2 97%   BMI 40.11 kg/m²     Physical Exam  Vitals and nursing note reviewed.   Constitutional:       General: She is not in acute distress.     Appearance: Normal appearance. She is well-developed. She is obese.   HENT:      Head: Normocephalic and atraumatic.      Right Ear: External ear normal.      Left Ear: External ear normal.      Nose: Nose normal.   Eyes:      Conjunctiva/sclera: Conjunctivae normal.   Cardiovascular:      Rate and Rhythm: Normal rate and regular rhythm.      Pulses: Normal pulses.      Heart sounds: Normal heart sounds. No murmur heard.  Pulmonary:      Effort: Pulmonary effort is normal. No respiratory distress.      Breath sounds: Normal breath sounds.   Abdominal:      Palpations: Abdomen is soft.      Tenderness: There is no abdominal tenderness.   Musculoskeletal:         General: No swelling. Normal range of motion.      Cervical back: Normal range of motion and neck supple.   Skin:     General: Skin is warm and dry.      Capillary Refill: Capillary refill takes less than 2 seconds.   Neurological:      General: No focal deficit present.      Mental Status: She is alert and oriented to person, place, and time. Mental status is at baseline.   Psychiatric:         Mood and Affect: Mood normal.         Behavior: Behavior normal.         Thought Content: Thought content normal.         Judgment: Judgment normal.       Administrative Statements     "

## 2024-06-24 NOTE — ASSESSMENT & PLAN NOTE
BP Readings from Last 3 Encounters:   06/24/24 121/81   05/17/24 138/88   03/25/24 122/70     - At goal. Continue Nifedipine 30 mg daily

## 2024-09-03 DIAGNOSIS — I10 PRIMARY HYPERTENSION: ICD-10-CM

## 2024-09-03 RX ORDER — NIFEDIPINE 30 MG
TABLET, EXTENDED RELEASE ORAL
Qty: 90 TABLET | Refills: 1 | Status: SHIPPED | OUTPATIENT
Start: 2024-09-03

## 2024-11-21 DIAGNOSIS — M65.312 TRIGGER FINGER OF LEFT THUMB: Primary | ICD-10-CM

## 2024-11-29 DIAGNOSIS — E78.5 HYPERLIPIDEMIA, UNSPECIFIED HYPERLIPIDEMIA TYPE: ICD-10-CM

## 2024-11-29 RX ORDER — ROSUVASTATIN CALCIUM 20 MG/1
20 TABLET, COATED ORAL DAILY
Qty: 90 TABLET | Refills: 1 | Status: SHIPPED | OUTPATIENT
Start: 2024-11-29

## 2024-12-07 DIAGNOSIS — G43.109 MIGRAINE WITH AURA AND WITHOUT STATUS MIGRAINOSUS, NOT INTRACTABLE: ICD-10-CM

## 2024-12-09 RX ORDER — SUMATRIPTAN SUCCINATE 25 MG/1
TABLET ORAL
Qty: 9 TABLET | Refills: 0 | OUTPATIENT
Start: 2024-12-09

## 2024-12-16 ENCOUNTER — OFFICE VISIT (OUTPATIENT)
Dept: OBGYN CLINIC | Facility: CLINIC | Age: 64
End: 2024-12-16
Payer: COMMERCIAL

## 2024-12-16 VITALS — BODY MASS INDEX: 40.3 KG/M2 | WEIGHT: 219 LBS | HEIGHT: 62 IN

## 2024-12-16 DIAGNOSIS — M25.511 CHRONIC RIGHT SHOULDER PAIN: ICD-10-CM

## 2024-12-16 DIAGNOSIS — M65.312 TRIGGER FINGER OF LEFT THUMB: Primary | ICD-10-CM

## 2024-12-16 DIAGNOSIS — G89.29 CHRONIC RIGHT SHOULDER PAIN: ICD-10-CM

## 2024-12-16 PROCEDURE — 99203 OFFICE O/P NEW LOW 30 MIN: CPT | Performed by: SURGERY

## 2024-12-16 PROCEDURE — 20550 NJX 1 TENDON SHEATH/LIGAMENT: CPT | Performed by: SURGERY

## 2024-12-16 RX ORDER — BETAMETHASONE SODIUM PHOSPHATE AND BETAMETHASONE ACETATE 3; 3 MG/ML; MG/ML
6 INJECTION, SUSPENSION INTRA-ARTICULAR; INTRALESIONAL; INTRAMUSCULAR; SOFT TISSUE
Status: COMPLETED | OUTPATIENT
Start: 2024-12-16 | End: 2024-12-16

## 2024-12-16 RX ORDER — LIDOCAINE HYDROCHLORIDE 10 MG/ML
2.5 INJECTION, SOLUTION INFILTRATION; PERINEURAL
Status: COMPLETED | OUTPATIENT
Start: 2024-12-16 | End: 2024-12-16

## 2024-12-16 RX ADMIN — LIDOCAINE HYDROCHLORIDE 2.5 ML: 10 INJECTION, SOLUTION INFILTRATION; PERINEURAL at 16:30

## 2024-12-16 RX ADMIN — BETAMETHASONE SODIUM PHOSPHATE AND BETAMETHASONE ACETATE 6 MG: 3; 3 INJECTION, SUSPENSION INTRA-ARTICULAR; INTRALESIONAL; INTRAMUSCULAR; SOFT TISSUE at 16:30

## 2024-12-16 NOTE — PROGRESS NOTES
ORTHOPAEDIC HAND, WRIST, AND ELBOW OFFICE  VISIT       ASSESSMENT/PLAN:      64 y.o. year old female who presents with left thumb trigger finger    Left thumb trigger finger CSI was offered and administered at today's visit.  Patient tolerated this well.  Postinjection instructions were provided.  Patient may take OTC medications as needed for pain control.  Patient may continue activity as tolerated  Patient will continue to monitor symptoms.  Patient reports right shoulder pain at today's visit.  Referral for patient to see Bryson Arteaga was given at today's visit.  Patient may follow-up on an as-needed basis if her symptoms worsen or we can possibly consider repeat left thumb trigger finger CSI.    The patient verbalized understanding of exam findings and treatment plan. We engaged in the shared decision-making process and treatment options were discussed at length with the patient. Surgical and conservative management discussed today along with risks and benefits.    Diagnoses and all orders for this visit:    Trigger finger of left thumb  -     Ambulatory Referral to Orthopedic Surgery  -     Hand/upper extremity injection: L thumb A1    Chronic right shoulder pain  -     Ambulatory Referral to Orthopedic Surgery; Future        Follow Up:  Return if symptoms worsen or fail to improve.    To Do Next Visit:  Re-evaluation of current issue      General Discussions:  Trigger Finger: The anatomy and physiology of trigger finger was discussed with the patient today in the office.  Edema and increased contact pressure within the flexor tendons at the A1 pulley can cause pain, crepitation, and triggering or locking of the digit resulting in limitation of function.  Symptoms can occur at anytime but are typically worse in the morning or after a brief rest from repetitive activity.  Treatment options include resting/nighttime MP blocking splints to decrease edema, oral anti-inflammatory medications, home or formal therapy  exercises, up to 2 steroid injections within the tendon sheath, or surgical release.  While majority of patients do respond to conservative treatment, up to 20% may require surgical release.             CHIEF COMPLAINT:  Chief Complaint   Patient presents with    Left Hand - Pain     1 month         SUBJECTIVE:  Mariana Swenson is a 64 y.o. year old female who presents for evaluation of catching and locking of left thumb that has been going on for 1 month. Denies GALILEO.       Pain/symptom timing:  Worse during the day when active  Pain/symptom context:  Worse with activites and work  Pain/symptom modifying factors:  Rest makes better, activities make worse  Pain/symptom associated signs/symptoms: none    Prior treatment   NSAIDsNo   Injections No   Bracing/Orthotics No    Physical Therapy No     I have personally reviewed all the relevant PMH, PSH, SH, FH, Medications and allergies      PAST MEDICAL HISTORY:  Past Medical History:   Diagnosis Date    Depression 2014    Peptic ulcer 2017    Temporary cerebral vascular dysfunction 2014    Transient ischemic attack 2014       PAST SURGICAL HISTORY:  Past Surgical History:   Procedure Laterality Date     SECTION  ,,       FAMILY HISTORY:  Family History   Problem Relation Age of Onset    Hypertension Mother     No Known Problems Sister     No Known Problems Sister     No Known Problems Sister     No Known Problems Sister     No Known Problems Sister     No Known Problems Sister     No Known Problems Maternal Aunt     No Known Problems Maternal Aunt     No Known Problems Maternal Aunt     No Known Problems Maternal Aunt     No Known Problems Maternal Aunt     No Known Problems Maternal Aunt     No Known Problems Maternal Aunt     No Known Problems Maternal Aunt     No Known Problems Maternal Aunt     No Known Problems Maternal Aunt     No Known Problems Maternal Aunt     No Known Problems Maternal Aunt     No Known Problems  Paternal Aunt     No Known Problems Paternal Aunt     No Known Problems Paternal Aunt     No Known Problems Paternal Aunt        SOCIAL HISTORY:  Social History     Tobacco Use    Smoking status: Never     Passive exposure: Never    Smokeless tobacco: Never   Vaping Use    Vaping status: Never Used   Substance Use Topics    Alcohol use: Yes     Comment: very rare, maybe 3 times a year    Drug use: Never       MEDICATIONS:    Current Outpatient Medications:     fluticasone (FLONASE) 50 mcg/act nasal spray, 1 spray into each nostril daily, Disp: 16 g, Rfl: 3    guaiFENesin (ROBITUSSIN) 100 MG/5ML oral liquid, Take 10 mL (200 mg total) by mouth 3 (three) times a day as needed for cough, Disp: 120 mL, Rfl: 0    NIFEdipine ER (ADALAT CC) 30 MG 24 hr tablet, TAKE 1 TABLET(30 MG) BY MOUTH DAILY, Disp: 90 tablet, Rfl: 1    omeprazole (PriLOSEC) 20 mg delayed release capsule, Take 1 capsule (20 mg total) by mouth daily, Disp: 180 capsule, Rfl: 1    polyethylene glycol (GLYCOLAX) 17 GM/SCOOP powder, Take 17 g by mouth daily, Disp: 578 g, Rfl: 1    rosuvastatin (CRESTOR) 20 MG tablet, TAKE 1 TABLET(20 MG) BY MOUTH DAILY, Disp: 90 tablet, Rfl: 1    SUMAtriptan (Imitrex) 25 mg tablet, Take 1 tablet (25 mg total) by mouth once as needed for migraine, Disp: 9 tablet, Rfl: 0    ALLERGIES:  Allergies   Allergen Reactions    Aspirin GI Intolerance    No Known Allergies            REVIEW OF SYSTEMS:  Review of Systems   Constitutional:  Negative for chills, fever and unexpected weight change.   HENT:  Negative for hearing loss, nosebleeds and sore throat.    Eyes:  Negative for pain, redness and visual disturbance.   Respiratory:  Negative for cough, shortness of breath and wheezing.    Cardiovascular:  Negative for chest pain, palpitations and leg swelling.   Gastrointestinal:  Negative for abdominal pain, nausea and vomiting.   Endocrine: Negative for polydipsia and polyuria.   Genitourinary:  Negative for dysuria and hematuria.    Musculoskeletal:         See HPI   Skin:  Negative for rash and wound.   Neurological:  Negative for dizziness, numbness and headaches.   Psychiatric/Behavioral:  Negative for decreased concentration and suicidal ideas. The patient is not nervous/anxious.        VITALS:  There were no vitals filed for this visit.    LABS:  HgA1c:   Lab Results   Component Value Date    HGBA1C 6.0 (H) 11/08/2022     BMP:   Lab Results   Component Value Date    CALCIUM 9.5 03/16/2024    K 4.9 03/16/2024    CO2 28 03/16/2024     03/16/2024    BUN 14 03/16/2024    CREATININE 0.85 03/16/2024       _____________________________________________________  PHYSICAL EXAMINATION:  General: well developed and well nourished, alert, oriented times 3, and appears comfortable  Psychiatric: Normal  HEENT: Normocephalic, Atraumatic Trachea Midline, No torticollis  Pulmonary: No audible wheezing or respiratory distress   Abdomen/GI: Non tender, non distended   Cardiovascular: No pitting edema, 2+ radial pulse   Skin: No masses, erythema, lacerations, fluctation, ulcerations  Neurovascular: Sensation Intact to the Median, Ulnar, Radial Nerve, Motor Intact to the Median, Ulnar, Radial Nerve, and Pulses Intact  Musculoskeletal: Normal, except as noted in detailed exam and in HPI.      MUSCULOSKELETAL EXAMINATION:  Left hand:    left Thumb finger:  Positive palpable flexor tendon nodule at the the A1 pulley level.   Positive tenderness to palpation over A1 pulley. Positive catching. Positive clicking.        PROCEDURES PERFORMED:  Hand/upper extremity injection: L thumb A1  Universal Protocol:  procedure performed by consultantConsent: Verbal consent obtained.  Risks and benefits: risks, benefits and alternatives were discussed  Consent given by: patient  Patient understanding: patient states understanding of the procedure being performed  Site marked: the operative site was marked  Patient identity confirmed: verbally with patient  Supporting  Documentation  Indications: pain and therapeutic   Procedure Details  Condition:trigger finger Location: thumb - L thumb A1   Needle size: 25 G  Ultrasound guidance: no  Approach: volar  Medications administered: 6 mg betamethasone acetate-betamethasone sodium phosphate 6 (3-3) mg/mL; 2.5 mL lidocaine 1 %  Patient tolerance: patient tolerated the procedure well with no immediate complications  Dressing:  Sterile dressing applied              _____________________________________________________      Scribe Attestation      I,:  Justyn Jefferson am acting as a scribe while in the presence of the attending physician.:       I,:  Vamshi Mireles MD personally performed the services described in this documentation    as scribed in my presence.:

## 2024-12-23 ENCOUNTER — APPOINTMENT (OUTPATIENT)
Age: 64
End: 2024-12-23
Payer: COMMERCIAL

## 2024-12-23 ENCOUNTER — OFFICE VISIT (OUTPATIENT)
Age: 64
End: 2024-12-23
Payer: COMMERCIAL

## 2024-12-23 VITALS — BODY MASS INDEX: 40.3 KG/M2 | HEIGHT: 62 IN | WEIGHT: 219 LBS

## 2024-12-23 DIAGNOSIS — M25.511 CHRONIC RIGHT SHOULDER PAIN: Primary | Chronic | ICD-10-CM

## 2024-12-23 DIAGNOSIS — G89.29 CHRONIC RIGHT SHOULDER PAIN: ICD-10-CM

## 2024-12-23 DIAGNOSIS — M25.511 CHRONIC RIGHT SHOULDER PAIN: ICD-10-CM

## 2024-12-23 DIAGNOSIS — G89.29 CHRONIC RIGHT SHOULDER PAIN: Primary | Chronic | ICD-10-CM

## 2024-12-23 PROCEDURE — 99214 OFFICE O/P EST MOD 30 MIN: CPT | Performed by: PHYSICIAN ASSISTANT

## 2024-12-23 PROCEDURE — 73030 X-RAY EXAM OF SHOULDER: CPT

## 2024-12-23 RX ORDER — NAPROXEN 500 MG/1
500 TABLET ORAL 2 TIMES DAILY WITH MEALS
Qty: 60 TABLET | Refills: 0 | Status: SHIPPED | OUTPATIENT
Start: 2024-12-23

## 2024-12-23 NOTE — PROGRESS NOTES
"Patient Name:  Mariana Swenson  MRN:  [id number]    Assessment & Plan     Right posterior shoulder pain, likely myofascial.  Since her initial onset she does note overall improvement in her pain with conservative management including rest and naproxen.  Pain is consistent with likely myofascial scapular pain.  Referral to physical therapy for home exercise program.  Prescription for naproxen.  Activities as tolerated modification avoid pain.  Follow-up in 6 weeks.  Consider MRI if symptoms persist.    Chief Complaint     Right shoulder pain    History of the Present Illness     Mariana Swenson is a 64 y.o. right-hand-dominant female who reports to the office today for evaluation of her right shoulder.  She notes an onset of pain a few weeks ago.  She denies any injury or trauma.  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  She believes the repetitive use and lifting has led to her pain.  She notes primarily posterior right shoulder pain without radiation.  Initially her pain was 9 out of 10 in intensity.  Currently her pain is 3 out of 10.  She has been taking Aleve with overall improvement.  She notes pain with reaching overhead behind her back as well as some occasional pain with lying on her side at night.  She denies any weakness or instability.  No numbness or tingling.  No fevers or chills.    Physical Exam     Ht 5' 2\" (1.575 m)   Wt 99.3 kg (219 lb)   BMI 40.06 kg/m²     Right shoulder: No gross deformity.  No tenderness anterior shoulder, lateral shoulder, posterior shoulder, and AC joint. PROM is , ER-abd 90, IR-abd 50.  Impingement signs are negative.  Empty can test is negative.  Speed's Test is negative.  Cross-body adduction test is negative.  5 out of 5 forward flexion strength.  Elbow wrist and digital range of motion are intact.  Sensation intact axillary, median, ulnar and radial nerves.  2+ radial pulse.    Eyes: Anicteric sclerae.  ENT: " Trachea midline.  Lungs: Normal respiratory effort.  CV: Capillary refill is less than 2 seconds.  Skin: Intact without erythema.  Lymph: No palpable lymphadenopathy.  Neuro: Sensation is grossly intact to light touch.  Psych: Mood and affect are appropriate.    Data Review     I have personally reviewed pertinent films in PACS, and my interpretation follows:    X-rays right shoulder 2024: No acute osseous abnormality.  No fracture or dislocation.  No significant degenerative changes.  Calcification adjacent to the inferior aspect of the glenoid.    Past Medical History:   Diagnosis Date    Depression 2014    Peptic ulcer 2017    Temporary cerebral vascular dysfunction 2014    Transient ischemic attack 2014       Past Surgical History:   Procedure Laterality Date     SECTION  ,,       Allergies   Allergen Reactions    Aspirin GI Intolerance    No Known Allergies        Current Outpatient Medications on File Prior to Visit   Medication Sig Dispense Refill    fluticasone (FLONASE) 50 mcg/act nasal spray 1 spray into each nostril daily 16 g 3    guaiFENesin (ROBITUSSIN) 100 MG/5ML oral liquid Take 10 mL (200 mg total) by mouth 3 (three) times a day as needed for cough 120 mL 0    NIFEdipine ER (ADALAT CC) 30 MG 24 hr tablet TAKE 1 TABLET(30 MG) BY MOUTH DAILY 90 tablet 1    omeprazole (PriLOSEC) 20 mg delayed release capsule Take 1 capsule (20 mg total) by mouth daily 180 capsule 1    polyethylene glycol (GLYCOLAX) 17 GM/SCOOP powder Take 17 g by mouth daily 578 g 1    rosuvastatin (CRESTOR) 20 MG tablet TAKE 1 TABLET(20 MG) BY MOUTH DAILY 90 tablet 1    SUMAtriptan (Imitrex) 25 mg tablet Take 1 tablet (25 mg total) by mouth once as needed for migraine 9 tablet 0     No current facility-administered medications on file prior to visit.       Social History     Tobacco Use    Smoking status: Never     Passive exposure: Never    Smokeless tobacco: Never   Vaping Use    Vaping  status: Never Used   Substance Use Topics    Alcohol use: Yes     Comment: very rare, maybe 3 times a year    Drug use: Never       Family History   Problem Relation Age of Onset    Hypertension Mother     No Known Problems Sister     No Known Problems Sister     No Known Problems Sister     No Known Problems Sister     No Known Problems Sister     No Known Problems Sister     No Known Problems Maternal Aunt     No Known Problems Maternal Aunt     No Known Problems Maternal Aunt     No Known Problems Maternal Aunt     No Known Problems Maternal Aunt     No Known Problems Maternal Aunt     No Known Problems Maternal Aunt     No Known Problems Maternal Aunt     No Known Problems Maternal Aunt     No Known Problems Maternal Aunt     No Known Problems Maternal Aunt     No Known Problems Maternal Aunt     No Known Problems Paternal Aunt     No Known Problems Paternal Aunt     No Known Problems Paternal Aunt     No Known Problems Paternal Aunt        Review of Systems     As stated in the HPI. All other systems reviewed and are negative.

## 2025-01-29 ENCOUNTER — OFFICE VISIT (OUTPATIENT)
Dept: OBGYN CLINIC | Facility: CLINIC | Age: 65
End: 2025-01-29
Payer: COMMERCIAL

## 2025-01-29 DIAGNOSIS — M65.312 TRIGGER FINGER OF LEFT THUMB: Primary | ICD-10-CM

## 2025-01-29 PROCEDURE — 99213 OFFICE O/P EST LOW 20 MIN: CPT | Performed by: SURGERY

## 2025-01-29 NOTE — PROGRESS NOTES
ORTHOPAEDIC HAND, WRIST, AND ELBOW OFFICE  VISIT       ASSESSMENT/PLAN:      64 y.o. year old female who presents with left thumb trigger finger    Discussed she needs to wait 6 weeks for repeat injections or surgery  She would like a repeat injection and will wait  Activities as tolerated  NSAIDs as needed for pain    The patient verbalized understanding of exam findings and treatment plan. We engaged in the shared decision-making process and treatment options were discussed at length with the patient. Surgical and conservative management discussed today along with risks and benefits.    Diagnoses and all orders for this visit:    Trigger finger of left thumb          Follow Up:  Return in about 5 weeks (around 3/5/2025) for Recheck.    To Do Next Visit:  Re-evaluation of current issue          CHIEF COMPLAINT:  Chief Complaint   Patient presents with    Left Hand - Pain     1 month         SUBJECTIVE:  Mariana Swenson is a 64 y.o. year old female who presents for follow up left thumb    Patient state that the injection gave her about 3 weeks of relief. She states the pain has returned but no locking or catching. She does also states the thumb feels warmer ot touch as well        I have personally reviewed all the relevant PMH, PSH, SH, FH, Medications and allergies      PAST MEDICAL HISTORY:  Past Medical History:   Diagnosis Date    Depression 2014    Peptic ulcer 2017    Temporary cerebral vascular dysfunction 2014    Transient ischemic attack 2014       PAST SURGICAL HISTORY:  Past Surgical History:   Procedure Laterality Date     SECTION  ,,       FAMILY HISTORY:  Family History   Problem Relation Age of Onset    Hypertension Mother     No Known Problems Sister     No Known Problems Sister     No Known Problems Sister     No Known Problems Sister     No Known Problems Sister     No Known Problems Sister     No Known Problems Maternal Aunt     No Known Problems  Maternal Aunt     No Known Problems Maternal Aunt     No Known Problems Maternal Aunt     No Known Problems Maternal Aunt     No Known Problems Maternal Aunt     No Known Problems Maternal Aunt     No Known Problems Maternal Aunt     No Known Problems Maternal Aunt     No Known Problems Maternal Aunt     No Known Problems Maternal Aunt     No Known Problems Maternal Aunt     No Known Problems Paternal Aunt     No Known Problems Paternal Aunt     No Known Problems Paternal Aunt     No Known Problems Paternal Aunt        SOCIAL HISTORY:  Social History     Tobacco Use    Smoking status: Never     Passive exposure: Never    Smokeless tobacco: Never   Vaping Use    Vaping status: Never Used   Substance Use Topics    Alcohol use: Yes     Comment: very rare, maybe 3 times a year    Drug use: Never       MEDICATIONS:    Current Outpatient Medications:     fluticasone (FLONASE) 50 mcg/act nasal spray, 1 spray into each nostril daily, Disp: 16 g, Rfl: 3    guaiFENesin (ROBITUSSIN) 100 MG/5ML oral liquid, Take 10 mL (200 mg total) by mouth 3 (three) times a day as needed for cough, Disp: 120 mL, Rfl: 0    naproxen (NAPROSYN) 500 mg tablet, Take 1 tablet (500 mg total) by mouth 2 (two) times a day with meals, Disp: 60 tablet, Rfl: 0    NIFEdipine ER (ADALAT CC) 30 MG 24 hr tablet, TAKE 1 TABLET(30 MG) BY MOUTH DAILY, Disp: 90 tablet, Rfl: 1    omeprazole (PriLOSEC) 20 mg delayed release capsule, Take 1 capsule (20 mg total) by mouth daily, Disp: 180 capsule, Rfl: 1    polyethylene glycol (GLYCOLAX) 17 GM/SCOOP powder, Take 17 g by mouth daily, Disp: 578 g, Rfl: 1    rosuvastatin (CRESTOR) 20 MG tablet, TAKE 1 TABLET(20 MG) BY MOUTH DAILY, Disp: 90 tablet, Rfl: 1    SUMAtriptan (Imitrex) 25 mg tablet, Take 1 tablet (25 mg total) by mouth once as needed for migraine, Disp: 9 tablet, Rfl: 0    ALLERGIES:  Allergies   Allergen Reactions    Aspirin GI Intolerance    No Known Allergies            REVIEW OF SYSTEMS:  Review of  Systems   Constitutional:  Negative for chills, fever and unexpected weight change.   HENT:  Negative for hearing loss, nosebleeds and sore throat.    Eyes:  Negative for pain, redness and visual disturbance.   Respiratory:  Negative for cough, shortness of breath and wheezing.    Cardiovascular:  Negative for chest pain, palpitations and leg swelling.   Gastrointestinal:  Negative for abdominal pain, nausea and vomiting.   Endocrine: Negative for polydipsia and polyuria.   Genitourinary:  Negative for dysuria and hematuria.   Musculoskeletal:         See HPI   Skin:  Negative for rash and wound.   Neurological:  Negative for dizziness, numbness and headaches.   Psychiatric/Behavioral:  Negative for decreased concentration and suicidal ideas. The patient is not nervous/anxious.        VITALS:  There were no vitals filed for this visit.    LABS:  HgA1c:   Lab Results   Component Value Date    HGBA1C 6.0 (H) 11/08/2022     BMP:   Lab Results   Component Value Date    CALCIUM 9.5 03/16/2024    K 4.9 03/16/2024    CO2 28 03/16/2024     03/16/2024    BUN 14 03/16/2024    CREATININE 0.85 03/16/2024       _____________________________________________________  PHYSICAL EXAMINATION:  General: well developed and well nourished, alert, oriented times 3, and appears comfortable  Psychiatric: Normal  HEENT: Normocephalic, Atraumatic Trachea Midline, No torticollis  Pulmonary: No audible wheezing or respiratory distress   Abdomen/GI: Non tender, non distended   Cardiovascular: No pitting edema, 2+ radial pulse   Skin: No masses, erythema, lacerations, fluctation, ulcerations  Neurovascular: Sensation Intact to the Median, Ulnar, Radial Nerve, Motor Intact to the Median, Ulnar, Radial Nerve, and Pulses Intact  Musculoskeletal: Normal, except as noted in detailed exam and in HPI.      MUSCULOSKELETAL EXAMINATION:  Left hand:    left Thumb finger:  Positive palpable flexor tendon nodule at the the A1 pulley level.   Positive  tenderness to palpation over A1 pulley. Negative catching. Negative clicking.        PROCEDURES PERFORMED:  Procedures   No procedures performed    _____________________________________________________      Scribe Attestation      I,:  Black Mcclellan am acting as a scribe while in the presence of the attending physician.:       I,:  Vamshi Mireles MD personally performed the services described in this documentation    as scribed in my presence.:

## 2025-02-03 ENCOUNTER — OFFICE VISIT (OUTPATIENT)
Age: 65
End: 2025-02-03
Payer: COMMERCIAL

## 2025-02-03 VITALS — BODY MASS INDEX: 40.3 KG/M2 | HEIGHT: 62 IN | WEIGHT: 219 LBS

## 2025-02-03 DIAGNOSIS — M25.511 CHRONIC RIGHT SHOULDER PAIN: Primary | ICD-10-CM

## 2025-02-03 DIAGNOSIS — G89.29 CHRONIC RIGHT SHOULDER PAIN: Primary | ICD-10-CM

## 2025-02-03 PROCEDURE — 99213 OFFICE O/P EST LOW 20 MIN: CPT | Performed by: PHYSICIAN ASSISTANT

## 2025-02-03 RX ORDER — NAPROXEN 500 MG/1
500 TABLET ORAL 2 TIMES DAILY WITH MEALS
Qty: 60 TABLET | Refills: 0 | Status: SHIPPED | OUTPATIENT
Start: 2025-02-03

## 2025-02-03 NOTE — PROGRESS NOTES
"Patient Name:  Mariana Swenson  MRN:  [id number]    Assessment & Plan     Right posterior shoulder pain.  Patient did not proceed with physical therapy as directed at her last appointment.  New referral placed for physical therapy today.  Refill provided today for naproxen.  Activities as tolerated with modification avoid pain.  Follow-up in 6 weeks.  Consider MRI if symptoms persist.    Chief Complaint     Follow up right shoulder pain    History of the Present Illness     Mariana Swenson is a 64 y.o. female who returns to the office today for follow up regarding her right shoulder.  She was initially evaluated on 12/23/2024.  At that time she was referred to physical therapy and prescribed naproxen.  She returns to the office today noting approximately 25% improvement of her symptoms.  She has been taking naproxen as needed.  She states she did not proceed with physical therapy as recommended.  She still notes discomfort in the right shoulder localized primarily to the posterior aspect.  Pain can reach 5 out of 10 in intensity and is worse with increased activity.  She denies any significant radiation distally into the right upper extremity.  She denies any weakness or instability.  No numbness or tingling.  No fevers or chills.    Physical Exam     Ht 5' 2\" (1.575 m)   Wt 99.3 kg (219 lb)   BMI 40.06 kg/m²     Right shoulder: No gross deformity.  No tenderness anterior shoulder, lateral shoulder, posterior shoulder, and AC joint. PROM is , ER-abd 90, IR-abd 50.  Impingement signs are negative.  Empty can test is mildly positive.  Speed's Test is mildly positive.  Cross-body adduction test is negative.  5 out of 5 forward flexion strength.  Elbow wrist and digital range of motion are intact.  Sensation intact axillary, median, ulnar and radial nerves.  2+ radial pulse.     Eyes: Anicteric sclerae.  ENT: Trachea midline.  Lungs: Normal respiratory effort.  CV: Capillary refill is less than 2 " seconds.  Skin: Intact without erythema.  Lymph: No palpable lymphadenopathy.  Neuro: Sensation is grossly intact to light touch.  Psych: Mood and affect are appropriate.    Data Review     I have personally reviewed pertinent films in PACS, and my interpretation follows:    X-rays right shoulder 2024: No acute osseous abnormality. No fracture or dislocation. No significant degenerative changes. Calcification adjacent to the inferior aspect of the glenoid     Past Medical History:   Diagnosis Date    Depression 2014    Peptic ulcer 2017    Temporary cerebral vascular dysfunction 2014    Transient ischemic attack 2014       Past Surgical History:   Procedure Laterality Date     SECTION  ,,       Allergies   Allergen Reactions    Aspirin GI Intolerance    No Known Allergies        Current Outpatient Medications on File Prior to Visit   Medication Sig Dispense Refill    fluticasone (FLONASE) 50 mcg/act nasal spray 1 spray into each nostril daily 16 g 3    guaiFENesin (ROBITUSSIN) 100 MG/5ML oral liquid Take 10 mL (200 mg total) by mouth 3 (three) times a day as needed for cough 120 mL 0    NIFEdipine ER (ADALAT CC) 30 MG 24 hr tablet TAKE 1 TABLET(30 MG) BY MOUTH DAILY 90 tablet 1    omeprazole (PriLOSEC) 20 mg delayed release capsule Take 1 capsule (20 mg total) by mouth daily 180 capsule 1    polyethylene glycol (GLYCOLAX) 17 GM/SCOOP powder Take 17 g by mouth daily 578 g 1    rosuvastatin (CRESTOR) 20 MG tablet TAKE 1 TABLET(20 MG) BY MOUTH DAILY 90 tablet 1    SUMAtriptan (Imitrex) 25 mg tablet Take 1 tablet (25 mg total) by mouth once as needed for migraine 9 tablet 0    [DISCONTINUED] naproxen (NAPROSYN) 500 mg tablet Take 1 tablet (500 mg total) by mouth 2 (two) times a day with meals 60 tablet 0     No current facility-administered medications on file prior to visit.       Social History     Tobacco Use    Smoking status: Never     Passive exposure: Never     Smokeless tobacco: Never   Vaping Use    Vaping status: Never Used   Substance Use Topics    Alcohol use: Yes     Comment: very rare, maybe 3 times a year    Drug use: Never       Family History   Problem Relation Age of Onset    Hypertension Mother     No Known Problems Sister     No Known Problems Sister     No Known Problems Sister     No Known Problems Sister     No Known Problems Sister     No Known Problems Sister     No Known Problems Maternal Aunt     No Known Problems Maternal Aunt     No Known Problems Maternal Aunt     No Known Problems Maternal Aunt     No Known Problems Maternal Aunt     No Known Problems Maternal Aunt     No Known Problems Maternal Aunt     No Known Problems Maternal Aunt     No Known Problems Maternal Aunt     No Known Problems Maternal Aunt     No Known Problems Maternal Aunt     No Known Problems Maternal Aunt     No Known Problems Paternal Aunt     No Known Problems Paternal Aunt     No Known Problems Paternal Aunt     No Known Problems Paternal Aunt        Review of Systems     As stated in the HPI. All other systems reviewed and are negative.

## 2025-02-17 ENCOUNTER — EVALUATION (OUTPATIENT)
Age: 65
End: 2025-02-17
Payer: COMMERCIAL

## 2025-02-17 DIAGNOSIS — G89.29 CHRONIC RIGHT SHOULDER PAIN: Primary | ICD-10-CM

## 2025-02-17 DIAGNOSIS — M25.511 CHRONIC RIGHT SHOULDER PAIN: Primary | ICD-10-CM

## 2025-02-17 PROCEDURE — 97530 THERAPEUTIC ACTIVITIES: CPT

## 2025-02-17 PROCEDURE — 97140 MANUAL THERAPY 1/> REGIONS: CPT

## 2025-02-17 NOTE — PROGRESS NOTES
PT Evaluation     Today's date: 2025  Patient name: Mariana Swenson  : 1960  MRN: [id number]  Referring provider: Bryson Arteaga P*  Dx:   Encounter Diagnosis     ICD-10-CM    1. Chronic right shoulder pain  M25.511 Ambulatory Referral to Physical Therapy    G89.29                      Assessment  Impairments: abnormal coordination, abnormal muscle firing, difficulty understanding, impaired physical strength, lacks appropriate home exercise program, pain with function, scapular dyskinesis, poor posture , poor body mechanics, participation limitations, activity limitations and endurance  Symptom irritability: low    Assessment details: Pt presents with s/s consistent with chronic R shoulder pain.   Pt impairments include decreased postural control, pain with movement in all directions, decreased soft tissue flexibility of the R pectoralis musculature as well as R cervical musculature, decreased UE/scapular strength, decreased thoracic/cervical mobility, pain with participation with work and sleeping.   HEP demonstrated and reviewed with Pt.  Pt would benefit from skilled PT services in order to meet goals and return to PLOF.  Understanding of Dx/Px/POC: good     Prognosis: good    Goals  Pt will adhere to HEP and demonstrate proper form per treating PT discretion in 2 weeks.    Pt will reduce resting pain levels by 2 points on the VAS in 2 weeks.     Pt will improve mobility of c/s and t/s to WNL in 8 weeks.    Pt will improve UE strength by 1 MMT in 8 weeks.    Pt will participate in work activities without pain in 8 weeks.     Pt will have reduced difficulty with sleeping in 8 weeks.       Plan  Patient would benefit from: skilled physical therapy  Referral necessary: No  Planned modality interventions: low level laser therapy, cryotherapy, thermotherapy: hydrocollator packs, neuromuscular electric stimulation and TENS  Other planned modality interventions: epat    Planned therapy  "interventions: abdominal trunk stabilization, IASTM, joint mobilization, manual therapy, kinesiology taping, massage, motor coordination training, nerve gliding, neuromuscular re-education, breathing training, patient/caregiver education, postural training, body mechanics training, coordination, compression, strengthening, stretching, therapeutic activities, therapeutic exercise, fine motor coordination training, flexibility, functional ROM exercises and home exercise program    Frequency: 1x week  Plan of Care beginning date: 2025  Plan of Care expiration date: 2025  Treatment plan discussed with: patient  Plan details: Pt will participate in skilled PT services 1x/week tapering as needed for 8 weeks in order to return to PLOF and meet goals.      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 Pt states medications are relieving. Pt has difficulty with sleeping and working. No n/t present.          Recurrent probem    Quality of life: good    Patient Goals  Patient goals for therapy: increased strength, independence with ADLs/IADLs, return to work, increased motion, improved balance and decreased pain  Patient goal: Pt wants to return to PLOF and be able to participate in work without pain.  Pain  Current pain ratin  At best pain ratin  At worst pain ratin  Location: R diffuse shoulder  Quality: dull ache  Relieving factors: medications and rest  Aggravating factors: overhead activity and lifting    Social Support  Lives in: multiple-level home    Employment status: working (packaging plant)    Diagnostic Tests  X-ray: normal      Objective     Postural Observations  Seated posture: poor  Standing posture: poor      Tenderness "     Right Shoulder  Tenderness in the biceps tendon (proximal).     Active Range of Motion   Left Shoulder   Normal active range of motion    Right Shoulder   Normal active range of motion    Scapular Mobility     Right Shoulder   Scapular mobility: fair  Scapular Mobility with Shoulder to 90° FF   Scapular winging: minimal  Scapular elevation: moderate  Upward rotation: excessive    Joint Play     Right Shoulder  Hypomobile in the anterior capsule, posterior capsule and SC joint.     Additional Joint Play Details  Elevated sternum    Strength/Myotome Testing     Right Shoulder     Planes of Motion   Flexion: 3+   Extension: 3+   Abduction: 3+   External rotation at 0°: 3+   External rotation at 90°: 3+   Internal rotation at 90°: 3+     Isolated Muscles   Lower trapezius: 3   Serratus anterior: 3     Tests     Right Shoulder   Negative anterior load and shift, active compression ([place]), apprehension, Hawkin's, painful arc, posterior load and shift and bicep load test positive.              Precautions: none      Manuals             IASTM delt             Pec stretch             UT STM                          Neuro Re-Ed             Scap squeezes             C/s ext snag             T/s ext             Sa punches             UT/LS stretch              Prone IYT                          Ther Ex             TB ext             TB row             TB ir/er                                                                              Ther Activity                                       Gait Training                                       Modalities

## 2025-02-18 PROCEDURE — 97161 PT EVAL LOW COMPLEX 20 MIN: CPT

## 2025-02-26 ENCOUNTER — OFFICE VISIT (OUTPATIENT)
Age: 65
End: 2025-02-26
Payer: COMMERCIAL

## 2025-02-26 DIAGNOSIS — M25.511 CHRONIC RIGHT SHOULDER PAIN: Primary | ICD-10-CM

## 2025-02-26 DIAGNOSIS — G89.29 CHRONIC RIGHT SHOULDER PAIN: Primary | ICD-10-CM

## 2025-02-26 PROCEDURE — 97110 THERAPEUTIC EXERCISES: CPT

## 2025-02-26 PROCEDURE — 97140 MANUAL THERAPY 1/> REGIONS: CPT

## 2025-02-26 NOTE — PROGRESS NOTES
"Daily Note     Today's date: 2025  Patient name: Mariana Swenson  : 1960  MRN: [id number]  Referring provider: Bryson Arteaga P*  Dx:   Encounter Diagnosis     ICD-10-CM    1. Chronic right shoulder pain  M25.511     G89.29           Start Time: 1530  Stop Time: 1620  Total time in clinic (min): 50 minutes    Subjective: Patient states she feels better since IE. Notes that HEP has helped her dec her pain.       Objective: See treatment diary below      Assessment: Initiated session with moist heat for tissue prep. Mod emphasis placed on manuals. Mod muscle tone in UT and middle and anterior delt. Noted improved tone post manuals. Tactile and Vcs utilized t/o session for correct form. Patient would benefit from continued PT to improve function.      Plan: Continue per plan of care.      Precautions: none      Manuals             IASTM delt  STM LQ           Pec stretch             UT STM  LQ                        Neuro Re-Ed             Scap squeezes  2x10           C/s ext snag  10x10\"           T/s ext  2x10, 10:           Sa punches  2x10, 5\"           UT/LS stretch   15\"x5 ea b/l           Prone IYT                          Ther Ex             TB ext  NV           TB row  NV           TB ir/er  NV                                                                            Ther Activity                                       Gait Training                                       Modalities                                            "

## 2025-03-03 ENCOUNTER — OFFICE VISIT (OUTPATIENT)
Dept: OBGYN CLINIC | Facility: CLINIC | Age: 65
End: 2025-03-03
Payer: COMMERCIAL

## 2025-03-03 DIAGNOSIS — M65.312 TRIGGER THUMB OF LEFT HAND: Primary | ICD-10-CM

## 2025-03-03 PROCEDURE — 99024 POSTOP FOLLOW-UP VISIT: CPT | Performed by: SURGERY

## 2025-03-03 PROCEDURE — 20550 NJX 1 TENDON SHEATH/LIGAMENT: CPT | Performed by: PHYSICIAN ASSISTANT

## 2025-03-03 RX ORDER — BETAMETHASONE SODIUM PHOSPHATE AND BETAMETHASONE ACETATE 3; 3 MG/ML; MG/ML
3 INJECTION, SUSPENSION INTRA-ARTICULAR; INTRALESIONAL; INTRAMUSCULAR; SOFT TISSUE
Status: COMPLETED | OUTPATIENT
Start: 2025-03-03 | End: 2025-03-03

## 2025-03-03 RX ORDER — LIDOCAINE HYDROCHLORIDE 10 MG/ML
2.5 INJECTION, SOLUTION INFILTRATION; PERINEURAL
Status: COMPLETED | OUTPATIENT
Start: 2025-03-03 | End: 2025-03-03

## 2025-03-03 RX ADMIN — BETAMETHASONE SODIUM PHOSPHATE AND BETAMETHASONE ACETATE 3 MG: 3; 3 INJECTION, SUSPENSION INTRA-ARTICULAR; INTRALESIONAL; INTRAMUSCULAR; SOFT TISSUE at 16:15

## 2025-03-03 RX ADMIN — LIDOCAINE HYDROCHLORIDE 2.5 ML: 10 INJECTION, SOLUTION INFILTRATION; PERINEURAL at 16:15

## 2025-03-03 NOTE — PROGRESS NOTES
Name: Mariana Swenson      : 1960      MRN: [id number]  Encounter Provider: Vamshi Mireles MD  Encounter Date: 3/3/2025   Encounter department: [facility]  :  Assessment & Plan  Trigger thumb of left hand  Repeat steroid injection provided today and the patient tolerated well.  Activities as tolerated.  F/U 3 months for re-evaluation.  If symptoms persist or return, will consider surgery as next step in treatment plan.  All questions answered.    History of Present Illness   HPI  Mariana Swenson is a 64 y.o. female who presents for office follow-up.  She has continued pain/locking L thumb.  No new symptoms.  Last received steroid injection about 3 months ago.  History obtained from: patient    Review of Systems  + L thumb pain and clicking/locking     Objective   There were no vitals taken for this visit.     Physical Exam  L thumb  No open wounds or erythema.  No ecchymosis or swelling.  Tender to palpation A1 pulley with palpable nodule.  Crepitus with range of motion, no active locking.  Decreased  strength secondary to this.  Sensation intact to light touch.  Good cap refill at the fingertip.  Palpable radial pulse.    Hand/upper extremity injection: L thumb A1  Universal Protocol:  Consent: Verbal consent obtained.  Risks and benefits: risks, benefits and alternatives were discussed  Consent given by: patient  Patient identity confirmed: verbally with patient  Supporting Documentation  Indications: pain and tendon swelling   Procedure Details  Condition:trigger finger Location: thumb - L thumb A1   Preparation: Patient was prepped and draped in the usual sterile fashion  Needle size: 22 G  Ultrasound guidance: no  Medications administered: 2.5 mL lidocaine 1 %; 3 mg betamethasone acetate-betamethasone sodium phosphate 6 (3-3) mg/mL  Patient tolerance: patient tolerated the procedure well with no immediate complications  Dressing:  Sterile dressing  applied

## 2025-03-03 NOTE — ASSESSMENT & PLAN NOTE
Repeat steroid injection provided today and the patient tolerated well.  Activities as tolerated.  F/U 3 months for re-evaluation.  If symptoms persist or return, will consider surgery as next step in treatment plan.  All questions answered.

## 2025-03-04 ENCOUNTER — OFFICE VISIT (OUTPATIENT)
Age: 65
End: 2025-03-04
Payer: COMMERCIAL

## 2025-03-04 DIAGNOSIS — I10 PRIMARY HYPERTENSION: ICD-10-CM

## 2025-03-04 DIAGNOSIS — G89.29 CHRONIC RIGHT SHOULDER PAIN: Primary | ICD-10-CM

## 2025-03-04 DIAGNOSIS — M25.511 CHRONIC RIGHT SHOULDER PAIN: Primary | ICD-10-CM

## 2025-03-04 PROCEDURE — 97140 MANUAL THERAPY 1/> REGIONS: CPT

## 2025-03-04 PROCEDURE — 97010 HOT OR COLD PACKS THERAPY: CPT

## 2025-03-04 PROCEDURE — 97112 NEUROMUSCULAR REEDUCATION: CPT

## 2025-03-04 PROCEDURE — 97110 THERAPEUTIC EXERCISES: CPT

## 2025-03-04 RX ORDER — NIFEDIPINE 30 MG
TABLET, EXTENDED RELEASE ORAL
Qty: 90 TABLET | Refills: 1 | OUTPATIENT
Start: 2025-03-04

## 2025-03-04 NOTE — PROGRESS NOTES
"Daily Note     Today's date: 3/4/2025  Patient name: Mariana Swenson  : 1960  MRN: [id number]  Referring provider: Bryson Arteaga P*  Dx:   Encounter Diagnosis     ICD-10-CM    1. Chronic right shoulder pain  M25.511     G89.29           Start Time: 1610  Stop Time: 1654  Total time in clinic (min): 44 minutes    Subjective: Pt states \"she has been feeling much better.\"       Objective: See treatment diary below      Assessment: Progressed patient repetitions as tolerated without adverse effects. Pt required manual cues to refrain from UT engagement with scapular squeezes. Added banded UE scapular strengthening exercises that patient tolerated well without discomfort. Tolerated treatment well. Patient would benefit from continued PT      Plan: Continue per plan of care.      Precautions: none      Manuals   3/          IASTM delt  STM LQ [facility]          Pec stretch             UT STM  LQ akc                       Neuro Re-Ed   3/          Scap squeezes  2x10 30x5\"          C/s ext snag  10x10\" 10x10\"          T/s ext  2x10, 10: 20x5\"  Bolster  seated          Sa punches  2x10, 5\" nv          UT/LS stretch   15\"x5 ea b/l           Prone IYT   nv                       Ther Ex   3/          TB ext  NV GTB  20x          TB row  NV GTB  20x          TB ir/er  NV GTB  20x ea                                                                           Ther Activity                                       Gait Training                                       Modalities   3/              R shld 10'                              "

## 2025-03-12 ENCOUNTER — OFFICE VISIT (OUTPATIENT)
Age: 65
End: 2025-03-12
Payer: COMMERCIAL

## 2025-03-12 DIAGNOSIS — M25.511 CHRONIC RIGHT SHOULDER PAIN: Primary | ICD-10-CM

## 2025-03-12 DIAGNOSIS — G89.29 CHRONIC RIGHT SHOULDER PAIN: Primary | ICD-10-CM

## 2025-03-12 PROCEDURE — 97112 NEUROMUSCULAR REEDUCATION: CPT

## 2025-03-12 PROCEDURE — 97140 MANUAL THERAPY 1/> REGIONS: CPT

## 2025-03-12 PROCEDURE — 97010 HOT OR COLD PACKS THERAPY: CPT

## 2025-03-12 NOTE — PROGRESS NOTES
"Daily Note     Today's date: 3/12/2025  Patient name: Mariana Swenson  : 1960  MRN: [id number]  Referring provider: Bryson Arteaga P*  Dx:   Encounter Diagnosis     ICD-10-CM    1. Chronic right shoulder pain  M25.511     G89.29           Start Time: 1515  Stop Time: 1600  Total time in clinic (min): 45 minutes    Subjective: Pt states she is \"feeling much better.\"       Objective: See treatment diary below      Assessment: Added GOKUL that required several mc for appropriate scapular depression with muscle engagement. Pt cont to respond favorably to treatment. Tolerated treatment well. Patient would benefit from continued PT      Plan: Continue per plan of care.      Precautions: none      Manuals  2/26 3/4 3/12         IASTM delt  STM [facility]         Pec stretch             UT STM  LQ [facility]                      Neuro Re-Ed   3/4 3/12         Scap squeezes  2x10 30x5\" 30x5\"         C/s ext snag  10x10\" 10x10\" 30x5\"         T/s ext  2x10, 10: 20x5\"  Bolster  seated 20x5\" bolster seated          Sa punches  2x10, 5\" nv          UT/LS stretch   15\"x5 ea b/l           Prone IYT   nv 2x10x5\" ea                      Ther Ex   3/4 3/12         TB ext  NV GTB  20x GTB  20x         TB row  NV GTB  20x GTB  20x         TB ir/er  NV GTB  20x ea GTB  20x                                                                           Ther Activity                                       Gait Training                                       Modalities   3/4 3/12             R shandrea 10'  R jasmeet 10'                               "

## 2025-03-19 ENCOUNTER — APPOINTMENT (OUTPATIENT)
Age: 65
End: 2025-03-19
Payer: COMMERCIAL

## 2025-03-27 ENCOUNTER — TELEPHONE (OUTPATIENT)
Age: 65
End: 2025-03-27

## 2025-03-27 NOTE — TELEPHONE ENCOUNTER
Called pt and LVM regarding her appt on 3/17/2025 Since she wasn't able to come in therefore we could reschedule her appt for a preferred time and date.

## 2025-04-23 DIAGNOSIS — G43.109 MIGRAINE WITH AURA AND WITHOUT STATUS MIGRAINOSUS, NOT INTRACTABLE: ICD-10-CM

## 2025-04-23 DIAGNOSIS — I10 PRIMARY HYPERTENSION: ICD-10-CM

## 2025-04-23 RX ORDER — NIFEDIPINE 30 MG
30 TABLET, EXTENDED RELEASE ORAL DAILY
Qty: 90 TABLET | Refills: 1 | Status: SHIPPED | OUTPATIENT
Start: 2025-04-23

## 2025-04-23 RX ORDER — SUMATRIPTAN SUCCINATE 25 MG/1
25 TABLET ORAL ONCE AS NEEDED
Qty: 9 TABLET | Refills: 0 | Status: SHIPPED | OUTPATIENT
Start: 2025-04-23

## 2025-05-23 ENCOUNTER — TELEPHONE (OUTPATIENT)
Dept: FAMILY MEDICINE CLINIC | Facility: CLINIC | Age: 65
End: 2025-05-23

## 2025-05-23 NOTE — TELEPHONE ENCOUNTER
Pt called and left vm.    Called and could not leave vm due to call not being accepted at the time.

## 2025-06-03 ENCOUNTER — OFFICE VISIT (OUTPATIENT)
Dept: FAMILY MEDICINE CLINIC | Facility: CLINIC | Age: 65
End: 2025-06-03

## 2025-06-03 VITALS
RESPIRATION RATE: 18 BRPM | OXYGEN SATURATION: 97 % | TEMPERATURE: 98 F | HEIGHT: 62 IN | SYSTOLIC BLOOD PRESSURE: 118 MMHG | WEIGHT: 218.5 LBS | DIASTOLIC BLOOD PRESSURE: 74 MMHG | HEART RATE: 73 BPM | BODY MASS INDEX: 40.21 KG/M2

## 2025-06-03 DIAGNOSIS — Z23 ENCOUNTER FOR IMMUNIZATION: ICD-10-CM

## 2025-06-03 DIAGNOSIS — I10 PRIMARY HYPERTENSION: ICD-10-CM

## 2025-06-03 DIAGNOSIS — G47.9 SLEEP DISORDER: ICD-10-CM

## 2025-06-03 DIAGNOSIS — G43.109 MIGRAINE WITH AURA AND WITHOUT STATUS MIGRAINOSUS, NOT INTRACTABLE: ICD-10-CM

## 2025-06-03 DIAGNOSIS — Z23 NEED FOR COVID-19 VACCINE: ICD-10-CM

## 2025-06-03 DIAGNOSIS — Z12.11 COLON CANCER SCREENING: ICD-10-CM

## 2025-06-03 DIAGNOSIS — Z00.00 ANNUAL PHYSICAL EXAM: Primary | ICD-10-CM

## 2025-06-03 DIAGNOSIS — E78.5 HYPERLIPIDEMIA, UNSPECIFIED HYPERLIPIDEMIA TYPE: ICD-10-CM

## 2025-06-03 DIAGNOSIS — Z12.31 ENCOUNTER FOR SCREENING MAMMOGRAM FOR BREAST CANCER: ICD-10-CM

## 2025-06-03 DIAGNOSIS — K21.9 GASTROESOPHAGEAL REFLUX DISEASE WITHOUT ESOPHAGITIS: ICD-10-CM

## 2025-06-03 PROCEDURE — 99214 OFFICE O/P EST MOD 30 MIN: CPT

## 2025-06-03 PROCEDURE — 90471 IMMUNIZATION ADMIN: CPT

## 2025-06-03 PROCEDURE — 91320 SARSCV2 VAC 30MCG TRS-SUC IM: CPT

## 2025-06-03 PROCEDURE — 90750 HZV VACC RECOMBINANT IM: CPT

## 2025-06-03 PROCEDURE — 99396 PREV VISIT EST AGE 40-64: CPT

## 2025-06-03 PROCEDURE — 90480 ADMN SARSCOV2 VAC 1/ONLY CMP: CPT

## 2025-06-03 RX ORDER — ROSUVASTATIN CALCIUM 20 MG/1
20 TABLET, COATED ORAL DAILY
Qty: 90 TABLET | Refills: 1 | Status: SHIPPED | OUTPATIENT
Start: 2025-06-03

## 2025-06-03 RX ORDER — SUMATRIPTAN SUCCINATE 25 MG/1
25 TABLET ORAL ONCE AS NEEDED
Qty: 9 TABLET | Refills: 0 | Status: SHIPPED | OUTPATIENT
Start: 2025-06-03

## 2025-06-03 RX ORDER — OMEPRAZOLE 20 MG/1
20 CAPSULE, DELAYED RELEASE ORAL DAILY
Qty: 180 CAPSULE | Refills: 1 | Status: SHIPPED | OUTPATIENT
Start: 2025-06-03

## 2025-06-03 NOTE — PATIENT INSTRUCTIONS
"   [phone]: gastro    Routine physical for adults   The Basics   Written by the doctors and editors at [facility]   What is a physical? -- A physical is a routine visit, or \"check-up,\" with your doctor. You might also hear it called a \"wellness visit\" or \"preventive visit.\"  During each visit, the doctor will:   Ask about your physical and mental health   Ask about your habits, behaviors, and lifestyle   Do an exam   Give you vaccines if needed   Talk to you about any medicines you take   Give advice about your health   Answer your questions  Getting regular check-ups is an important part of taking care of your health. It can help your doctor find and treat any problems you have. But it's also important for preventing health problems.  A routine physical is different from a \"sick visit.\" A sick visit is when you see a doctor because of a health concern or problem. Since physicals are scheduled ahead of time, you can think about what you want to ask the doctor.  How often should I get a physical? -- It depends on your age and health. In general, for people age 21 years and older:   If you are younger than 50 years, you might be able to get a physical every 3 years.   If you are 50 years or older, your doctor might recommend a physical every year.  If you have an ongoing health condition, like diabetes or high blood pressure, your doctor will probably want to see you more often.  What happens during a physical? -- In general, each visit will include:   Physical exam - The doctor or nurse will check your height, weight, heart rate, and blood pressure. They will also look at your eyes and ears. They will ask about how you are feeling and whether you have any symptoms that bother you.   Medicines - It's a good idea to bring a list of all the medicines you take to each doctor visit. Your doctor will talk to you about your medicines and answer any questions. Tell them if you are having any side effects that bother you. " "You should also tell them if you are having trouble paying for any of your medicines.   Habits and behaviors - This includes:   Your diet   Your exercise habits   Whether you smoke, drink alcohol, or use drugs   Whether you are sexually active   Whether you feel safe at home  Your doctor will talk to you about things you can do to improve your health and lower your risk of health problems. They will also offer help and support. For example, if you want to quit smoking, they can give you advice and might prescribe medicines. If you want to improve your diet or get more physical activity, they can help you with this, too.   Lab tests, if needed - The tests you get will depend on your age and situation. For example, your doctor might want to check your:   Cholesterol   Blood sugar   Iron level   Vaccines - The recommended vaccines will depend on your age, health, and what vaccines you already had. Vaccines are very important because they can prevent certain serious or deadly infections.   Discussion of screening - \"Screening\" means checking for diseases or other health problems before they cause symptoms. Your doctor can recommend screening based on your age, risk, and preferences. This might include tests to check for:   Cancer, such as breast, prostate, cervical, ovarian, colorectal, prostate, lung, or skin cancer   Sexually transmitted infections, such as chlamydia and gonorrhea   Mental health conditions like depression and anxiety  Your doctor will talk to you about the different types of screening tests. They can help you decide which screenings to have. They can also explain what the results might mean.   Answering questions - The physical is a good time to ask the doctor or nurse questions about your health. If needed, they can refer you to other doctors or specialists, too.  Adults older than 65 years often need other care, too. As you get older, your doctor will talk to you about:   How to prevent falling at " home   Hearing or vision tests   Memory testing   How to take your medicines safely   Making sure that you have the help and support you need at home  All topics are updated as new evidence becomes available and our peer review process is complete.  This topic retrieved from [employer] on: May 02, 2024.  Topic 854771 Version 1.0  Release: 32.4.3 - C32.122  © 2024 UpToDate, Inc. and/or its affiliates. All rights reserved.  Consumer Information Use and Disclaimer   Disclaimer: This generalized information is a limited summary of diagnosis, treatment, and/or medication information. It is not meant to be comprehensive and should be used as a tool to help the user understand and/or assess potential diagnostic and treatment options. It does NOT include all information about conditions, treatments, medications, side effects, or risks that may apply to a specific patient. It is not intended to be medical advice or a substitute for the medical advice, diagnosis, or treatment of a health care provider based on the health care provider's examination and assessment of a patient's specific and unique circumstances. Patients must speak with a health care provider for complete information about their health, medical questions, and treatment options, including any risks or benefits regarding use of medications. This information does not endorse any treatments or medications as safe, effective, or approved for treating a specific patient. UpToDate, Inc. and its affiliates disclaim any warranty or liability relating to this information or the use thereof.The use of this information is governed by the Terms of Use, available at https://www.woltersQlikTechuwer.com/en/know/clinical-effectiveness-terms. 2024© UpToDate, Inc. and its affiliates and/or licensors. All rights reserved.  Copyright   © 2024 UpToDate, Inc. and/or its affiliates. All rights reserved.

## 2025-06-03 NOTE — ASSESSMENT & PLAN NOTE
Orders:    Basic metabolic panel; Future    Hemoglobin A1C; Future    Lipid Panel with Direct LDL reflex; Future    Albumin / creatinine urine ratio; Future    rosuvastatin (CRESTOR) 20 MG tablet; Take 1 tablet (20 mg total) by mouth daily

## 2025-06-03 NOTE — ASSESSMENT & PLAN NOTE
Orders:    Basic metabolic panel; Future    Hemoglobin A1C; Future    Lipid Panel with Direct LDL reflex; Future    Albumin / creatinine urine ratio; Future

## 2025-06-03 NOTE — PROGRESS NOTES
Adult Annual Physical  Name: Mariana Swenson      : 1960      MRN: [id number]  Encounter Provider: OMAIRA Alicia  Encounter Date: 6/3/2025   Encounter department: [facility]    :  Assessment & Plan  Annual physical exam         Hyperlipidemia, unspecified hyperlipidemia type    Orders:    Basic metabolic panel; Future    Hemoglobin A1C; Future    Lipid Panel with Direct LDL reflex; Future    Albumin / creatinine urine ratio; Future    rosuvastatin (CRESTOR) 20 MG tablet; Take 1 tablet (20 mg total) by mouth daily    Primary hypertension    Orders:    Basic metabolic panel; Future    Hemoglobin A1C; Future    Lipid Panel with Direct LDL reflex; Future    Albumin / creatinine urine ratio; Future    Encounter for immunization    Orders:    Zoster Vaccine Recombinant IM    Need for COVID-19 vaccine    Orders:    COVID-19 Pfizer mRNA vaccine 12 yr and older (Comirnaty pre-filled syringe)    Colon cancer screening    Orders:    Ambulatory Referral to Gastroenterology; Future    Encounter for screening mammogram for breast cancer    Orders:    Mammo screening bilateral w 3d and cad; Future    Sleep disorder    Orders:    Ambulatory Referral to Sleep Medicine; Future    Migraine with aura and without status migrainosus, not intractable    Orders:    SUMAtriptan (Imitrex) 25 mg tablet; Take 1 tablet (25 mg total) by mouth once as needed for migraine    Gastroesophageal reflux disease without esophagitis    Orders:    omeprazole (PriLOSEC) 20 mg delayed release capsule; Take 1 capsule (20 mg total) by mouth daily        Preventive Screenings:    - Cholesterol Screening: screening not indicated and has hyperlipidemia   - Hepatitis C screening: screening up-to-date   - HIV screening: screening up-to-date   - Lung cancer screening: screening not indicated     Immunizations:  - Immunizations due: Zoster (Shingrix)    BMI Counseling: Body mass index is 39.96 kg/m².  The BMI is above normal. Nutrition recommendations include decreasing portion sizes, encouraging healthy choices of fruits and vegetables, decreasing fast food intake, consuming healthier snacks, limiting drinks that contain sugar, moderation in carbohydrate intake, increasing intake of lean protein, reducing intake of saturated and trans fat and reducing intake of cholesterol. Exercise recommendations include moderate physical activity 150 minutes/week. No pharmacotherapy was ordered. Rationale for BMI follow-up plan is due to patient being overweight or obese.         History of Present Illness     Adult Annual Physical:  Patient presents for annual physical.     Diet and Physical Activity:  - Diet/Nutrition: no special diet.  - Exercise: no formal exercise.    Depression Screening:  - PHQ-2 Score: 0    General Health:  - Sleep: unrefreshing sleep, 4-6 hours of sleep on average, sleeps poorly and snores loudly.  - Hearing: normal hearing bilateral ears.  - Vision: wears glasses and most recent eye exam > 1 year ago.  - Dental: regular dental visits and brushes teeth twice daily.    /GYN Health:  - Follows with GYN: no.   - Menopause: postmenopausal.   - History of STDs: no  - Contraception: menopause.      Advanced Care Planning:  - Has an advanced directive?: no    - Has a durable medical POA?: no    - ACP document given to patient?: no      Review of Systems   Constitutional:  Negative for chills and fever.   HENT:  Negative for ear pain and sore throat.    Eyes:  Negative for pain and visual disturbance.   Respiratory:  Negative for cough and shortness of breath.    Cardiovascular:  Negative for chest pain and palpitations.   Gastrointestinal:  Negative for abdominal pain and vomiting.   Genitourinary:  Negative for dysuria and hematuria.   Musculoskeletal:  Negative for arthralgias and back pain.   Skin:  Negative for color change and rash.   Neurological:  Negative for seizures and syncope.   All other  "systems reviewed and are negative.        Objective   /74 (BP Location: Left arm, Patient Position: Sitting, Cuff Size: Standard)   Pulse 73   Temp 98 °F (36.7 °C) (Temporal)   Resp 18   Ht 5' 2\" (1.575 m)   Wt 99.1 kg (218 lb 8 oz)   SpO2 97%   BMI 39.96 kg/m²     Physical Exam  Vitals and nursing note reviewed.   Constitutional:       General: She is not in acute distress.     Appearance: Normal appearance. She is well-developed. She is obese.   HENT:      Head: Normocephalic and atraumatic.      Right Ear: External ear normal.      Left Ear: External ear normal.      Nose: Nose normal.     Eyes:      Conjunctiva/sclera: Conjunctivae normal.       Cardiovascular:      Rate and Rhythm: Normal rate and regular rhythm.      Pulses: Normal pulses.      Heart sounds: Normal heart sounds. No murmur heard.  Pulmonary:      Effort: Pulmonary effort is normal. No respiratory distress.      Breath sounds: Normal breath sounds.   Abdominal:      Palpations: Abdomen is soft.      Tenderness: There is no abdominal tenderness.     Musculoskeletal:         General: Normal range of motion.      Cervical back: Normal range of motion and neck supple.     Skin:     General: Skin is warm and dry.     Neurological:      Mental Status: She is alert and oriented to person, place, and time. Mental status is at baseline.     Psychiatric:         Mood and Affect: Mood normal.         Behavior: Behavior normal.         Thought Content: Thought content normal.         Judgment: Judgment normal.       Vision Screening    Right eye Left eye Both eyes   Without correction      With correction 20/20 20/20 20/20         "

## 2025-06-03 NOTE — ASSESSMENT & PLAN NOTE
Orders:    SUMAtriptan (Imitrex) 25 mg tablet; Take 1 tablet (25 mg total) by mouth once as needed for migraine

## 2025-07-30 DIAGNOSIS — G89.29 CHRONIC RIGHT SHOULDER PAIN: ICD-10-CM

## 2025-07-30 DIAGNOSIS — J30.1 SEASONAL ALLERGIC RHINITIS DUE TO POLLEN: ICD-10-CM

## 2025-07-30 DIAGNOSIS — J06.9 UPPER RESPIRATORY INFECTION, VIRAL: ICD-10-CM

## 2025-07-30 DIAGNOSIS — M25.511 CHRONIC RIGHT SHOULDER PAIN: ICD-10-CM

## 2025-07-31 RX ORDER — NAPROXEN 500 MG/1
500 TABLET ORAL 2 TIMES DAILY WITH MEALS
Qty: 60 TABLET | Refills: 0 | Status: SHIPPED | OUTPATIENT
Start: 2025-07-31

## 2025-07-31 RX ORDER — FLUTICASONE PROPIONATE 50 MCG
1 SPRAY, SUSPENSION (ML) NASAL DAILY
Qty: 16 G | Refills: 3 | Status: SHIPPED | OUTPATIENT
Start: 2025-07-31

## 2025-08-12 ENCOUNTER — APPOINTMENT (OUTPATIENT)
Dept: LAB | Facility: HOSPITAL | Age: 65
End: 2025-08-12
Payer: COMMERCIAL

## 2025-08-12 ENCOUNTER — OFFICE VISIT (OUTPATIENT)
Dept: FAMILY MEDICINE CLINIC | Facility: CLINIC | Age: 65
End: 2025-08-12